# Patient Record
Sex: MALE | Race: BLACK OR AFRICAN AMERICAN | Employment: OTHER | URBAN - METROPOLITAN AREA
[De-identification: names, ages, dates, MRNs, and addresses within clinical notes are randomized per-mention and may not be internally consistent; named-entity substitution may affect disease eponyms.]

---

## 2017-02-13 RX ORDER — LISINOPRIL AND HYDROCHLOROTHIAZIDE 20; 12.5 MG/1; MG/1
TABLET ORAL
Qty: 60 TABLET | Refills: 3 | Status: SHIPPED | OUTPATIENT
Start: 2017-02-13 | End: 2017-07-06

## 2017-04-24 RX ORDER — TAMSULOSIN HYDROCHLORIDE 0.4 MG/1
CAPSULE ORAL
Qty: 30 CAPSULE | Refills: 0 | Status: SHIPPED | OUTPATIENT
Start: 2017-04-24 | End: 2017-05-12

## 2017-05-01 RX ORDER — FLUTICASONE PROPIONATE 50 MCG
SPRAY, SUSPENSION (ML) NASAL
Qty: 1 BOTTLE | Refills: 0 | Status: SHIPPED | OUTPATIENT
Start: 2017-05-01 | End: 2019-09-09

## 2017-05-12 ENCOUNTER — OFFICE VISIT (OUTPATIENT)
Dept: INTERNAL MEDICINE CLINIC | Facility: CLINIC | Age: 82
End: 2017-05-12

## 2017-05-12 VITALS
TEMPERATURE: 99 F | WEIGHT: 182.19 LBS | OXYGEN SATURATION: 98 % | BODY MASS INDEX: 27.61 KG/M2 | HEART RATE: 100 BPM | DIASTOLIC BLOOD PRESSURE: 82 MMHG | HEIGHT: 68 IN | SYSTOLIC BLOOD PRESSURE: 132 MMHG

## 2017-05-12 DIAGNOSIS — E11.9 TYPE 2 DIABETES MELLITUS WITHOUT COMPLICATION, WITHOUT LONG-TERM CURRENT USE OF INSULIN (HCC): ICD-10-CM

## 2017-05-12 DIAGNOSIS — M51.37 DEGENERATION OF LUMBAR OR LUMBOSACRAL INTERVERTEBRAL DISC: ICD-10-CM

## 2017-05-12 DIAGNOSIS — E78.5 HYPERLIPIDEMIA, UNSPECIFIED HYPERLIPIDEMIA TYPE: ICD-10-CM

## 2017-05-12 DIAGNOSIS — I10 HYPERTENSION, BENIGN: Primary | ICD-10-CM

## 2017-05-12 PROCEDURE — G0463 HOSPITAL OUTPT CLINIC VISIT: HCPCS | Performed by: INTERNAL MEDICINE

## 2017-05-12 PROCEDURE — 99213 OFFICE O/P EST LOW 20 MIN: CPT | Performed by: INTERNAL MEDICINE

## 2017-05-12 PROCEDURE — 36415 COLL VENOUS BLD VENIPUNCTURE: CPT | Performed by: INTERNAL MEDICINE

## 2017-05-12 RX ORDER — TAMSULOSIN HYDROCHLORIDE 0.4 MG/1
0.4 CAPSULE ORAL
Qty: 30 CAPSULE | Refills: 6 | Status: SHIPPED | OUTPATIENT
Start: 2017-05-12 | End: 2018-05-25

## 2017-05-12 NOTE — PATIENT INSTRUCTIONS
ASSESSMENT/PLAN:   Diagnoses and all orders for this visit:    Hypertension, benign  BP doing very well, to continue current meds  Hyperlipidemia, unspecified hyperlipidemia type  Needs cholesterol checked, 3 hours fasting but will check  Type 2 diabet

## 2017-05-12 NOTE — PROGRESS NOTES
HPI:    Patient ID: Shalini Avendaño is a 81year old male. HPI   Diabetes  Patient doing well, watching diet and not taking any medication  Hypertension  Patient is here for follow up of hypertension.  BP at home:occasionally check and is good  Has bee tablet Rfl: 3   LOVASTATIN 20 MG Oral Tab TAKE 1 TABLET BY MOUTH EVERY DAY WITH EVENING MEAL Disp: 90 tablet Rfl: 1   GABAPENTIN 300 MG Oral Cap TAKE 2 CAPSULES BY MOUTH NIGHTLY Disp: 180 capsule Rfl: 3   TraMADol HCl (ULTRAM) 50 MG Oral Tab  Disp:  Rfl: 0

## 2017-06-26 RX ORDER — LOVASTATIN 20 MG/1
TABLET ORAL
Qty: 90 TABLET | Refills: 0 | Status: SHIPPED | OUTPATIENT
Start: 2017-06-26 | End: 2017-10-10

## 2017-07-06 RX ORDER — LISINOPRIL AND HYDROCHLOROTHIAZIDE 20; 12.5 MG/1; MG/1
TABLET ORAL
Qty: 60 TABLET | Refills: 3 | Status: SHIPPED | OUTPATIENT
Start: 2017-07-06 | End: 2017-11-08

## 2017-07-07 ENCOUNTER — TELEPHONE (OUTPATIENT)
Dept: INTERNAL MEDICINE CLINIC | Facility: CLINIC | Age: 82
End: 2017-07-07

## 2017-07-07 NOTE — TELEPHONE ENCOUNTER
Patient dropped of a form for doctor to fill out for [employer] exemption (on your desk).  Call patient when ready  He will

## 2017-08-04 ENCOUNTER — HOSPITAL ENCOUNTER (EMERGENCY)
Facility: HOSPITAL | Age: 82
Discharge: HOME | End: 2017-08-04
Attending: EMERGENCY MEDICINE
Payer: COMMERCIAL

## 2017-08-04 ENCOUNTER — TELEPHONE (OUTPATIENT)
Dept: INTERNAL MEDICINE CLINIC | Facility: CLINIC | Age: 82
End: 2017-08-04

## 2017-08-04 VITALS
OXYGEN SATURATION: 99 % | DIASTOLIC BLOOD PRESSURE: 89 MMHG | RESPIRATION RATE: 18 BRPM | TEMPERATURE: 98 F | SYSTOLIC BLOOD PRESSURE: 143 MMHG | HEART RATE: 86 BPM

## 2017-08-04 DIAGNOSIS — L03.811 ABSCESS OR CELLULITIS OF SCALP: Primary | ICD-10-CM

## 2017-08-04 PROCEDURE — 99283 EMERGENCY DEPT VISIT LOW MDM: CPT

## 2017-08-04 RX ORDER — DOXYCYCLINE HYCLATE 100 MG/1
100 CAPSULE ORAL 2 TIMES DAILY
Qty: 14 CAPSULE | Refills: 0 | Status: SHIPPED | OUTPATIENT
Start: 2017-08-04 | End: 2017-08-11

## 2017-08-04 NOTE — TELEPHONE ENCOUNTER
Spoke with patient, informed to follow up next week after ER visit. Per patient he will be out of town and will call office back to schedule appointment.  Patient instructed to make sure and finish full course of antibiotic treatment, patient verbalized und

## 2017-08-04 NOTE — ED PROVIDER NOTES
Patient Seen in: [facility] AND [facility] Emergency Department    History   Patient presents with:  Bump    Stated Complaint: bump to back of head, under skin since yesterday, tender to touch denies any ot    HPI    82 yo M with PMH HTN presenting for evaluation swelling. Positive for stated complaint: bump to back of head, under skin since yesterday, tender to touch denies any ot*  Other systems are as noted in HPI. Constitutional and vital signs reviewed.       All other systems reviewed and negative except a Hyclate 100 MG Oral Cap  Take 1 capsule (100 mg total) by mouth 2 (two) times daily.   Qty: 14 capsule Refills: 0

## 2017-10-10 RX ORDER — LOVASTATIN 20 MG/1
TABLET ORAL
Qty: 90 TABLET | Refills: 0 | Status: SHIPPED | OUTPATIENT
Start: 2017-10-10 | End: 2017-11-16

## 2017-10-10 NOTE — TELEPHONE ENCOUNTER
Rx request for Lovastatin 20 mg, PASSED Cholesterol Protocol. Rx filled per protocol.   Cholesterol Medications  Protocol Criteria:  · Appointment scheduled in the past 12 months or in the next 3 months  · ALT & LDL on file in the past 12 months  · ALT resu

## 2017-11-08 RX ORDER — LISINOPRIL AND HYDROCHLOROTHIAZIDE 20; 12.5 MG/1; MG/1
TABLET ORAL
Qty: 60 TABLET | Refills: 1 | Status: SHIPPED | OUTPATIENT
Start: 2017-11-08 | End: 2017-11-16

## 2017-11-08 RX ORDER — FLUTICASONE PROPIONATE 50 MCG
SPRAY, SUSPENSION (ML) NASAL
Qty: 1 BOTTLE | Refills: 1 | Status: SHIPPED | OUTPATIENT
Start: 2017-11-08 | End: 2017-11-16

## 2017-11-16 ENCOUNTER — MED REC SCAN ONLY (OUTPATIENT)
Dept: INTERNAL MEDICINE CLINIC | Facility: CLINIC | Age: 82
End: 2017-11-16

## 2017-11-16 ENCOUNTER — OFFICE VISIT (OUTPATIENT)
Dept: INTERNAL MEDICINE CLINIC | Facility: CLINIC | Age: 82
End: 2017-11-16

## 2017-11-16 VITALS
OXYGEN SATURATION: 96 % | TEMPERATURE: 98 F | WEIGHT: 185 LBS | BODY MASS INDEX: 28.04 KG/M2 | HEIGHT: 68 IN | SYSTOLIC BLOOD PRESSURE: 126 MMHG | HEART RATE: 91 BPM | DIASTOLIC BLOOD PRESSURE: 86 MMHG

## 2017-11-16 DIAGNOSIS — E11.9 TYPE 2 DIABETES MELLITUS WITHOUT COMPLICATION, WITHOUT LONG-TERM CURRENT USE OF INSULIN (HCC): ICD-10-CM

## 2017-11-16 DIAGNOSIS — I10 HYPERTENSION, BENIGN: Primary | ICD-10-CM

## 2017-11-16 PROCEDURE — 99213 OFFICE O/P EST LOW 20 MIN: CPT | Performed by: INTERNAL MEDICINE

## 2017-11-16 PROCEDURE — G0008 ADMIN INFLUENZA VIRUS VAC: HCPCS | Performed by: INTERNAL MEDICINE

## 2017-11-16 PROCEDURE — 90653 IIV ADJUVANT VACCINE IM: CPT | Performed by: INTERNAL MEDICINE

## 2017-11-16 PROCEDURE — 90472 IMMUNIZATION ADMIN EACH ADD: CPT | Performed by: INTERNAL MEDICINE

## 2017-11-16 PROCEDURE — 90670 PCV13 VACCINE IM: CPT | Performed by: INTERNAL MEDICINE

## 2017-11-16 PROCEDURE — G0009 ADMIN PNEUMOCOCCAL VACCINE: HCPCS | Performed by: INTERNAL MEDICINE

## 2017-11-16 PROCEDURE — G0463 HOSPITAL OUTPT CLINIC VISIT: HCPCS | Performed by: INTERNAL MEDICINE

## 2017-11-16 RX ORDER — LOVASTATIN 20 MG/1
TABLET ORAL
Qty: 90 TABLET | Refills: 1 | Status: SHIPPED | OUTPATIENT
Start: 2017-11-16 | End: 2018-09-14

## 2017-11-16 RX ORDER — LISINOPRIL AND HYDROCHLOROTHIAZIDE 20; 12.5 MG/1; MG/1
2 TABLET ORAL
Qty: 180 TABLET | Refills: 1 | Status: SHIPPED | OUTPATIENT
Start: 2017-11-16 | End: 2018-05-25

## 2017-11-16 NOTE — PROGRESS NOTES
HPI:    Patient ID: Conrado Dunlap is a 81year old male. Cough   This is a new problem. Episode onset: few days. The problem has been unchanged. The cough is productive of sputum (unsure of the color).  Associated symptoms include nasal congestion an 02:18 PM    05/12/2017 02:18 PM   K 3.6 05/12/2017 02:18 PM    05/12/2017 02:18 PM   CO2 28 05/12/2017 02:18 PM   CREATSERUM 1.24 05/12/2017 02:18 PM   CA 9.9 05/12/2017 02:18 PM   AST 21 05/12/2017 02:18 PM   ALT 17 05/12/2017 02:18 PM   TSH 1 Cardiovascular: Normal rate and regular rhythm. Edema not present. Carotid bruit not present.   Pulmonary/Chest: Effort normal and breath sounds normal.           ASSESSMENT/PLAN:   Diagnoses and all orders for this visit:    Hypertension, benign  BP o

## 2017-11-16 NOTE — PATIENT INSTRUCTIONS
ASSESSMENT/PLAN:   Diagnoses and all orders for this visit:    Hypertension, benign  BP overall doing well, encouraged to exercise regularly  Type 2 diabetes mellitus without complication, without long-term current use of insulin (HCC)  -     COMP METABOLI

## 2017-12-13 NOTE — TELEPHONE ENCOUNTER
Pt wanted to let Dr Matias Neither know that his at [address] [place] [address] room . General

## 2018-05-25 ENCOUNTER — TELEPHONE (OUTPATIENT)
Dept: INTERNAL MEDICINE CLINIC | Facility: CLINIC | Age: 83
End: 2018-05-25

## 2018-05-25 RX ORDER — LISINOPRIL AND HYDROCHLOROTHIAZIDE 20; 12.5 MG/1; MG/1
2 TABLET ORAL
Qty: 180 TABLET | Refills: 1 | Status: SHIPPED | OUTPATIENT
Start: 2018-05-25 | End: 2018-10-11

## 2018-05-25 RX ORDER — TAMSULOSIN HYDROCHLORIDE 0.4 MG/1
CAPSULE ORAL
Qty: 30 CAPSULE | Refills: 0 | Status: SHIPPED | OUTPATIENT
Start: 2018-05-25 | End: 2018-08-03

## 2018-05-25 NOTE — TELEPHONE ENCOUNTER
Needs refill he is compltly out     Current Outpatient Prescriptions:   • •  Lisinopril-Hydrochlorothiazide 20-12.5 MG Oral Tab, Take 2 tablets by mouth once daily. , Disp: 180 tablet, Rfl: 1  •

## 2018-06-13 ENCOUNTER — OFFICE VISIT (OUTPATIENT)
Dept: INTERNAL MEDICINE CLINIC | Facility: CLINIC | Age: 83
End: 2018-06-13

## 2018-06-13 VITALS
HEART RATE: 91 BPM | DIASTOLIC BLOOD PRESSURE: 82 MMHG | WEIGHT: 185 LBS | HEIGHT: 68 IN | SYSTOLIC BLOOD PRESSURE: 136 MMHG | RESPIRATION RATE: 18 BRPM | BODY MASS INDEX: 28.04 KG/M2 | TEMPERATURE: 99 F

## 2018-06-13 DIAGNOSIS — R53.83 MALAISE AND FATIGUE: ICD-10-CM

## 2018-06-13 DIAGNOSIS — E11.9 TYPE 2 DIABETES MELLITUS WITHOUT COMPLICATION, WITHOUT LONG-TERM CURRENT USE OF INSULIN (HCC): ICD-10-CM

## 2018-06-13 DIAGNOSIS — I10 HYPERTENSION, BENIGN: Primary | ICD-10-CM

## 2018-06-13 DIAGNOSIS — R53.81 MALAISE AND FATIGUE: ICD-10-CM

## 2018-06-13 PROCEDURE — G0463 HOSPITAL OUTPT CLINIC VISIT: HCPCS | Performed by: INTERNAL MEDICINE

## 2018-06-13 PROCEDURE — 99213 OFFICE O/P EST LOW 20 MIN: CPT | Performed by: INTERNAL MEDICINE

## 2018-06-13 NOTE — PATIENT INSTRUCTIONS
ASSESSMENT/PLAN:   Hypertension, benign  BP overall doing very well.  Continue regular exercise    Type II diabetes mellitus ([address]  Sugar overall doing very well, will recheck labs

## 2018-06-13 NOTE — PROGRESS NOTES
HPI:    Patient ID: Diony Gomez is a 81year old male. HPI  Diabetes  Patient here for follow up of Diabetes. Has been taking medications regularly.     Currently taking no meds  Checks sugars not checking  Trying to be more active   Watches diabe Positive for nocturia (occ, not bothersome). Musculoskeletal: Positive for back pain (chronic, doing some exercises).            Current Outpatient Prescriptions:  TAMSULOSIN HCL 0.4 MG Oral Cap TAKE ONE CAPSULE BY MOUTH ONCE DAILY Disp: 30 capsule Rfl: 0

## 2018-06-15 NOTE — PROGRESS NOTES
Labs normal, lipid panel good, A1C good (but a little higher, needs to try and watch diet a little better, recheck 6 months, lipids 1 year.   Continue same meds, diet and regular exercise

## 2018-07-08 ENCOUNTER — HOSPITAL ENCOUNTER (EMERGENCY)
Facility: HOSPITAL | Age: 83
Discharge: HOME | End: 2018-07-08
Attending: EMERGENCY MEDICINE
Payer: MEDICARE

## 2018-07-08 VITALS
HEART RATE: 85 BPM | DIASTOLIC BLOOD PRESSURE: 95 MMHG | OXYGEN SATURATION: 98 % | RESPIRATION RATE: 16 BRPM | BODY MASS INDEX: 28.88 KG/M2 | TEMPERATURE: 99 F | HEIGHT: 67 IN | SYSTOLIC BLOOD PRESSURE: 166 MMHG | WEIGHT: 184 LBS

## 2018-07-08 DIAGNOSIS — I83.899 RUPTURED VARICOSITY: Primary | ICD-10-CM

## 2018-07-08 PROCEDURE — 12001 RPR S/N/AX/GEN/TRNK 2.5CM/<: CPT

## 2018-07-08 PROCEDURE — 99283 EMERGENCY DEPT VISIT LOW MDM: CPT

## 2018-07-08 NOTE — ED PROVIDER NOTES
Patient Seen in: [facility] AND [facility] Emergency Department    History   Patient presents with:  Bleeding (hematologic)    Stated Complaint: scab on right groinal area, bleeding uncontrolled    HPI    The patient is a 43-year-old male with past history of hy Nonicteric sclera, no conjunctival injection  ENT: TMs are clear and flat bilaterally.   There is no posterior pharyngeal erythema  Chest: Clear to auscultation, no tenderness  Cardiovascular: Regular rate and rhythm without murmur  Abdomen: Soft, nontender

## 2018-08-05 NOTE — TELEPHONE ENCOUNTER
Review pended refill request as it does not fall under a protocol.     Refill Protocol Appointment Criteria  · Appointment scheduled in the past 12 months or in the next 3 months  Recent Outpatient Visits            1 month ago Hypertension, benign    [place]

## 2018-08-06 RX ORDER — TAMSULOSIN HYDROCHLORIDE 0.4 MG/1
CAPSULE ORAL
Qty: 90 CAPSULE | Refills: 3 | Status: SHIPPED | OUTPATIENT
Start: 2018-08-06 | End: 2018-10-11

## 2018-09-14 RX ORDER — LOVASTATIN 20 MG/1
TABLET ORAL
Qty: 90 TABLET | Refills: 0 | Status: SHIPPED | OUTPATIENT
Start: 2018-09-14 | End: 2018-10-11

## 2018-10-11 ENCOUNTER — OFFICE VISIT (OUTPATIENT)
Dept: INTERNAL MEDICINE CLINIC | Facility: CLINIC | Age: 83
End: 2018-10-11
Payer: MEDICARE

## 2018-10-11 ENCOUNTER — MED REC SCAN ONLY (OUTPATIENT)
Dept: INTERNAL MEDICINE CLINIC | Facility: CLINIC | Age: 83
End: 2018-10-11

## 2018-10-11 VITALS
DIASTOLIC BLOOD PRESSURE: 88 MMHG | HEART RATE: 78 BPM | WEIGHT: 188 LBS | BODY MASS INDEX: 29 KG/M2 | SYSTOLIC BLOOD PRESSURE: 169 MMHG | TEMPERATURE: 98 F | OXYGEN SATURATION: 98 %

## 2018-10-11 DIAGNOSIS — I10 HYPERTENSION, BENIGN: ICD-10-CM

## 2018-10-11 DIAGNOSIS — E11.9 TYPE 2 DIABETES MELLITUS WITHOUT COMPLICATION, WITHOUT LONG-TERM CURRENT USE OF INSULIN (HCC): ICD-10-CM

## 2018-10-11 DIAGNOSIS — E78.5 HYPERLIPIDEMIA, UNSPECIFIED HYPERLIPIDEMIA TYPE: Primary | ICD-10-CM

## 2018-10-11 PROCEDURE — 99214 OFFICE O/P EST MOD 30 MIN: CPT | Performed by: INTERNAL MEDICINE

## 2018-10-11 PROCEDURE — G0008 ADMIN INFLUENZA VIRUS VAC: HCPCS | Performed by: INTERNAL MEDICINE

## 2018-10-11 PROCEDURE — 90653 IIV ADJUVANT VACCINE IM: CPT | Performed by: INTERNAL MEDICINE

## 2018-10-11 RX ORDER — LISINOPRIL AND HYDROCHLOROTHIAZIDE 20; 12.5 MG/1; MG/1
2 TABLET ORAL
Qty: 180 TABLET | Refills: 1 | Status: SHIPPED | OUTPATIENT
Start: 2018-10-11 | End: 2018-11-20

## 2018-10-11 RX ORDER — LOVASTATIN 20 MG/1
20 TABLET ORAL NIGHTLY
Qty: 90 TABLET | Refills: 0 | Status: SHIPPED | OUTPATIENT
Start: 2018-10-11 | End: 2018-11-20

## 2018-10-11 RX ORDER — TAMSULOSIN HYDROCHLORIDE 0.4 MG/1
0.4 CAPSULE ORAL
Qty: 90 CAPSULE | Refills: 3 | Status: SHIPPED | OUTPATIENT
Start: 2018-10-11 | End: 2018-11-20

## 2018-10-11 NOTE — PATIENT INSTRUCTIONS
Avoid sweets as much as possible  Please take medicines as prescribed. Avoid too much of salt. Low-Salt Choices  Eating salt (sodium) can make your body retain too much water. Excess water makes your heart work harder.  Canned, packaged, and frozen foods

## 2018-10-11 NOTE — PROGRESS NOTES
Onesimo Hutchinson is a 81year old male. Patient presents with:  Cold  Follow-up of diabetes and hypertension  HPI:   56-year-old male with a past medical history of diet-controlled diabetes hypertension dyslipidemia BPH here to establish care.   Patient repo Smokeless tobacco: Never Used    Alcohol use: No    Drug use: No     Family History   Problem Relation Age of Onset   • Heart Disease Father 79        CAD      No Known Allergies     REVIEW OF SYSTEMS:     Review of Systems   Constitutional: Negative for a respiratory distress. He has no wheezes. He has no rales. Abdominal: Soft. Bowel sounds are normal. There is no tenderness. Neurological: He is alert and oriented to person, place, and time. No cranial nerve deficit.  Coordination normal.   Skin: Skin i 1/11/2019).

## 2018-11-20 ENCOUNTER — TELEPHONE (OUTPATIENT)
Dept: INTERNAL MEDICINE CLINIC | Facility: CLINIC | Age: 83
End: 2018-11-20

## 2018-11-20 RX ORDER — LOVASTATIN 20 MG/1
20 TABLET ORAL NIGHTLY
Qty: 90 TABLET | Refills: 0 | Status: SHIPPED | OUTPATIENT
Start: 2018-11-20 | End: 2019-02-18

## 2018-11-20 RX ORDER — LISINOPRIL AND HYDROCHLOROTHIAZIDE 20; 12.5 MG/1; MG/1
2 TABLET ORAL
Qty: 180 TABLET | Refills: 0 | Status: SHIPPED | OUTPATIENT
Start: 2018-11-20 | End: 2019-03-11

## 2018-11-20 RX ORDER — TAMSULOSIN HYDROCHLORIDE 0.4 MG/1
0.4 CAPSULE ORAL
Qty: 90 CAPSULE | Refills: 0 | Status: SHIPPED | OUTPATIENT
Start: 2018-11-20 | End: 2020-02-04

## 2018-11-20 NOTE — TELEPHONE ENCOUNTER
Please resend to pharmacy  Now has insurance     Current Outpatient Medications:   •  tamsulosin HCl 0.4 MG Oral Cap, Take 1 capsule (0.4 mg total) by mouth once daily. , Disp: 90 capsule, Rfl: 3    •  Lovastatin 20 MG Oral Tab, Take 1 tablet (20 mg total)

## 2019-02-14 ENCOUNTER — OFFICE VISIT (OUTPATIENT)
Dept: INTERNAL MEDICINE CLINIC | Facility: CLINIC | Age: 84
End: 2019-02-14
Payer: MEDICARE

## 2019-02-14 VITALS
SYSTOLIC BLOOD PRESSURE: 157 MMHG | HEART RATE: 73 BPM | TEMPERATURE: 98 F | OXYGEN SATURATION: 95 % | WEIGHT: 182 LBS | DIASTOLIC BLOOD PRESSURE: 76 MMHG | BODY MASS INDEX: 29 KG/M2

## 2019-02-14 DIAGNOSIS — E78.5 HYPERLIPIDEMIA, UNSPECIFIED HYPERLIPIDEMIA TYPE: Primary | ICD-10-CM

## 2019-02-14 DIAGNOSIS — N52.8 OTHER MALE ERECTILE DYSFUNCTION: ICD-10-CM

## 2019-02-14 DIAGNOSIS — E11.9 TYPE 2 DIABETES MELLITUS WITHOUT COMPLICATION, WITHOUT LONG-TERM CURRENT USE OF INSULIN (HCC): ICD-10-CM

## 2019-02-14 DIAGNOSIS — I10 HYPERTENSION, BENIGN: ICD-10-CM

## 2019-02-14 LAB
ALBUMIN SERPL-MCNC: 3.9 G/DL (ref 3.4–5)
ALBUMIN/GLOB SERPL: 0.9 {RATIO} (ref 1–2)
ALP LIVER SERPL-CCNC: 61 U/L (ref 45–117)
ALT SERPL-CCNC: 19 U/L (ref 16–61)
ANION GAP SERPL CALC-SCNC: 3 MMOL/L (ref 0–18)
AST SERPL-CCNC: 17 U/L (ref 15–37)
BILIRUB SERPL-MCNC: 0.6 MG/DL (ref 0.1–2)
BUN BLD-MCNC: 16 MG/DL (ref 7–18)
BUN/CREAT SERPL: 12.6 (ref 10–20)
CALCIUM BLD-MCNC: 9.9 MG/DL (ref 8.5–10.1)
CHLORIDE SERPL-SCNC: 106 MMOL/L (ref 98–107)
CHOLEST SMN-MCNC: 132 MG/DL (ref ?–200)
CO2 SERPL-SCNC: 31 MMOL/L (ref 21–32)
CREAT BLD-MCNC: 1.27 MG/DL (ref 0.7–1.3)
CREAT UR-SCNC: 109 MG/DL
DEPRECATED RDW RBC AUTO: 47 FL (ref 35.1–46.3)
ERYTHROCYTE [DISTWIDTH] IN BLOOD BY AUTOMATED COUNT: 15.5 % (ref 11–15)
EST. AVERAGE GLUCOSE BLD GHB EST-MCNC: 146 MG/DL (ref 68–126)
GLOBULIN PLAS-MCNC: 4.3 G/DL (ref 2.8–4.4)
GLUCOSE BLD-MCNC: 114 MG/DL (ref 70–99)
HBA1C MFR BLD HPLC: 6.7 % (ref ?–5.7)
HCT VFR BLD AUTO: 43.6 % (ref 39–53)
HDLC SERPL-MCNC: 48 MG/DL (ref 40–59)
HGB BLD-MCNC: 13.7 G/DL (ref 13–17.5)
LDLC SERPL CALC-MCNC: 72 MG/DL (ref ?–100)
M PROTEIN MFR SERPL ELPH: 8.2 G/DL (ref 6.4–8.2)
MCH RBC QN AUTO: 26.3 PG (ref 26–34)
MCHC RBC AUTO-ENTMCNC: 31.4 G/DL (ref 31–37)
MCV RBC AUTO: 83.8 FL (ref 80–100)
MICROALBUMIN UR-MCNC: 18.5 MG/DL
MICROALBUMIN/CREAT 24H UR-RTO: 169.7 UG/MG (ref ?–30)
NONHDLC SERPL-MCNC: 84 MG/DL (ref ?–130)
OSMOLALITY SERPL CALC.SUM OF ELEC: 292 MOSM/KG (ref 275–295)
PLATELET # BLD AUTO: 230 10(3)UL (ref 150–450)
POTASSIUM SERPL-SCNC: 4.3 MMOL/L (ref 3.5–5.1)
RBC # BLD AUTO: 5.2 X10(6)UL (ref 3.8–5.8)
SODIUM SERPL-SCNC: 140 MMOL/L (ref 136–145)
TRIGL SERPL-MCNC: 61 MG/DL (ref 30–149)
TSI SER-ACNC: 1.76 MIU/ML (ref 0.36–3.74)
WBC # BLD AUTO: 3.7 X10(3) UL (ref 4–11)

## 2019-02-14 PROCEDURE — 99214 OFFICE O/P EST MOD 30 MIN: CPT | Performed by: INTERNAL MEDICINE

## 2019-02-14 RX ORDER — SILDENAFIL 50 MG/1
50 TABLET, FILM COATED ORAL
Qty: 5 TABLET | Refills: 3 | Status: SHIPPED | OUTPATIENT
Start: 2019-02-14 | End: 2019-09-09

## 2019-02-14 RX ORDER — AMLODIPINE BESYLATE 5 MG/1
5 TABLET ORAL DAILY
Qty: 90 TABLET | Refills: 3 | Status: SHIPPED | OUTPATIENT
Start: 2019-02-14 | End: 2020-02-09

## 2019-02-14 NOTE — PATIENT INSTRUCTIONS
Blood pressure is still running little bit in the higher side. I have added a new medication called amlodipine. Please take it once a day along with your other blood pressure medication. Try to avoid salt as much as possible.   For your diabetes, we have

## 2019-02-14 NOTE — PROGRESS NOTES
Polo Setting is a 81year old male. Patient presents with:  Hyperlipidemia  Follow-up of hypertension and erectile dysfunction.   HPI:   42-year-old gentleman with a past medical history of diabetes, hypertension, dyslipidemia, BPH with rectal dysfunctio History:   Procedure Laterality Date   • APPENDECTOMY     • LAMINECTOMY  1987   • LUMBAR SPINE FUSION COMBINED  8-8-13   • REPAIR ROTATOR CUFF,ACUTE Left 1993   • TONSILLECTOMY        Social History:  Social History    Tobacco Use      Smoking status:  Form No respiratory distress. He has no wheezes. He has no rales. Abdominal: Soft. Bowel sounds are normal.   Lymphadenopathy:     He has no cervical adenopathy. Neurological: He is alert and oriented to person, place, and time.  No sensory deficit or motor

## 2019-03-11 RX ORDER — LISINOPRIL AND HYDROCHLOROTHIAZIDE 20; 12.5 MG/1; MG/1
2 TABLET ORAL
Qty: 180 TABLET | Refills: 0 | Status: SHIPPED | OUTPATIENT
Start: 2019-03-11 | End: 2019-09-09

## 2019-09-09 ENCOUNTER — HOSPITAL ENCOUNTER (EMERGENCY)
Facility: HOSPITAL | Age: 84
Discharge: HOME | End: 2019-09-10
Attending: EMERGENCY MEDICINE | Admitting: INTERNAL MEDICINE
Payer: MEDICARE

## 2019-09-09 ENCOUNTER — OFFICE VISIT (OUTPATIENT)
Dept: INTERNAL MEDICINE CLINIC | Facility: CLINIC | Age: 84
End: 2019-09-09
Payer: MEDICARE

## 2019-09-09 ENCOUNTER — TELEPHONE (OUTPATIENT)
Dept: INTERNAL MEDICINE CLINIC | Facility: CLINIC | Age: 84
End: 2019-09-09

## 2019-09-09 VITALS
TEMPERATURE: 100 F | OXYGEN SATURATION: 96 % | DIASTOLIC BLOOD PRESSURE: 80 MMHG | HEART RATE: 92 BPM | SYSTOLIC BLOOD PRESSURE: 134 MMHG | RESPIRATION RATE: 20 BRPM

## 2019-09-09 VITALS
DIASTOLIC BLOOD PRESSURE: 75 MMHG | WEIGHT: 181 LBS | SYSTOLIC BLOOD PRESSURE: 130 MMHG | OXYGEN SATURATION: 95 % | HEART RATE: 73 BPM | BODY MASS INDEX: 27.43 KG/M2 | HEIGHT: 68 IN

## 2019-09-09 DIAGNOSIS — E11.9 TYPE 2 DIABETES MELLITUS WITHOUT COMPLICATION, WITHOUT LONG-TERM CURRENT USE OF INSULIN (HCC): ICD-10-CM

## 2019-09-09 DIAGNOSIS — I10 HYPERTENSION, BENIGN: Primary | ICD-10-CM

## 2019-09-09 DIAGNOSIS — N52.8 OTHER MALE ERECTILE DYSFUNCTION: ICD-10-CM

## 2019-09-09 DIAGNOSIS — S40.022A HEMATOMA OF ARM, LEFT, INITIAL ENCOUNTER: Primary | ICD-10-CM

## 2019-09-09 DIAGNOSIS — Z23 NEED FOR PNEUMOCOCCAL VACCINATION: ICD-10-CM

## 2019-09-09 DIAGNOSIS — N40.0 BENIGN PROSTATIC HYPERPLASIA WITHOUT LOWER URINARY TRACT SYMPTOMS: ICD-10-CM

## 2019-09-09 DIAGNOSIS — E78.5 HYPERLIPIDEMIA, UNSPECIFIED HYPERLIPIDEMIA TYPE: ICD-10-CM

## 2019-09-09 LAB
EST. AVERAGE GLUCOSE BLD GHB EST-MCNC: 157 MG/DL (ref 68–126)
HBA1C MFR BLD HPLC: 7.1 % (ref ?–5.7)

## 2019-09-09 PROCEDURE — 36415 COLL VENOUS BLD VENIPUNCTURE: CPT | Performed by: INTERNAL MEDICINE

## 2019-09-09 PROCEDURE — G0009 ADMIN PNEUMOCOCCAL VACCINE: HCPCS | Performed by: INTERNAL MEDICINE

## 2019-09-09 PROCEDURE — 90732 PPSV23 VACC 2 YRS+ SUBQ/IM: CPT | Performed by: INTERNAL MEDICINE

## 2019-09-09 PROCEDURE — 99282 EMERGENCY DEPT VISIT SF MDM: CPT

## 2019-09-09 PROCEDURE — 99214 OFFICE O/P EST MOD 30 MIN: CPT | Performed by: INTERNAL MEDICINE

## 2019-09-09 RX ORDER — LOVASTATIN 20 MG/1
TABLET ORAL
Qty: 90 TABLET | Refills: 1 | Status: SHIPPED | OUTPATIENT
Start: 2019-09-09 | End: 2020-03-16

## 2019-09-09 RX ORDER — LOVASTATIN 20 MG/1
TABLET ORAL
Refills: 0 | COMMUNITY
Start: 2019-04-13 | End: 2019-09-09

## 2019-09-09 RX ORDER — SILDENAFIL 100 MG/1
100 TABLET, FILM COATED ORAL AS NEEDED
Qty: 6 TABLET | Refills: 3 | Status: SHIPPED | OUTPATIENT
Start: 2019-09-09 | End: 2019-10-09

## 2019-09-09 RX ORDER — FLUTICASONE PROPIONATE 50 MCG
SPRAY, SUSPENSION (ML) NASAL
Qty: 1 BOTTLE | Refills: 0 | Status: SHIPPED | OUTPATIENT
Start: 2019-09-09

## 2019-09-09 RX ORDER — LISINOPRIL AND HYDROCHLOROTHIAZIDE 20; 12.5 MG/1; MG/1
2 TABLET ORAL
Qty: 180 TABLET | Refills: 0 | Status: SHIPPED | OUTPATIENT
Start: 2019-09-09 | End: 2019-12-17

## 2019-09-09 NOTE — PROGRESS NOTES
Daly Allison is a 81year old male. Patient presents with:  Hypertension: routine check-up  Sleep Problem: pt states that he is sleepy all the time    HPI:   60-year-old gentleman with a past medical history of hypertension, dyslipidemia, diabetes here CUFF,ACUTE Left 1993   • TONSILLECTOMY        Social History:  Social History    Tobacco Use      Smoking status: Former Smoker        Types: Cigarettes      Smokeless tobacco: Never Used    Alcohol use: No    Drug use: No     Family History   Problem Rela medications. Discussed low-salt diet. We will do the same medication. 2. Type 2 diabetes mellitus without complication, without long-term current use of insulin ([address]  He reports that he sees an ophthalmologist on an annual basis. Dov Lobo   He will try to DR NAJMA BURTON [facility]

## 2019-09-09 NOTE — PATIENT INSTRUCTIONS
ASSESSMENT AND PLAN:   1. Hypertension, benign  Well-controlled. Reviewed the medications. Discussed low-salt diet. We will do the same medication.     2. Type 2 diabetes mellitus without complication, without long-term current use of insulin ([facility]  He

## 2019-09-10 NOTE — ED PROVIDER NOTES
Patient Seen in: [facility] AND [facility] Emergency Department    History   No chief complaint on file. HPI    Patient presents to the ED complaining of swelling to his left shoulder and mild discomfort after having a shot for pneumonia in this area today. Constitutional: He is oriented to person, place, and time. He appears well-developed and well-nourished. No distress. HENT:   Head: Normocephalic and atraumatic. Cardiovascular: Normal rate and intact distal pulses.    Pulmonary/Chest: Effort normal. arm, left, initial encounter  (primary encounter diagnosis)    Disposition:  Discharge    Follow-up:  Lona Rice MD  [address] [facility] [phone]    Schedule an appointment as soon as possible for a visit in 3 days        Medications P

## 2019-09-12 ENCOUNTER — OFFICE VISIT (OUTPATIENT)
Dept: INTERNAL MEDICINE CLINIC | Facility: CLINIC | Age: 84
End: 2019-09-12
Payer: MEDICARE

## 2019-09-12 VITALS
BODY MASS INDEX: 27.28 KG/M2 | HEIGHT: 68 IN | DIASTOLIC BLOOD PRESSURE: 66 MMHG | HEART RATE: 99 BPM | SYSTOLIC BLOOD PRESSURE: 107 MMHG | WEIGHT: 180 LBS

## 2019-09-12 DIAGNOSIS — M79.602 PAIN OF LEFT UPPER EXTREMITY: Primary | ICD-10-CM

## 2019-09-12 PROCEDURE — 99213 OFFICE O/P EST LOW 20 MIN: CPT | Performed by: INTERNAL MEDICINE

## 2019-09-12 NOTE — PATIENT INSTRUCTIONS
As discussed, the arm pain is because of the localized reaction of the vaccination. I am glad to hear that it is getting better. Hopefully it will continue to improve and will be back to normal in the next 4 to 5 days.   Thank you

## 2019-09-12 NOTE — PROGRESS NOTES
Marleen Moser is a 81year old male. Patient presents with:  ER F/U: reaction to immunization    HPI:   30-year-old gentleman here for evaluation of left arm pain.   He recently received vaccine and later developed arm pain and swelling was seen in the e Former Smoker        Types: Cigarettes      Smokeless tobacco: Never Used    Alcohol use: No    Drug use: No     Family History   Problem Relation Age of Onset   • Heart Disease Father 79        CAD      No Known Allergies     REVIEW OF SYSTEMS:     Review before. Plan: As above. The patient indicates understanding of these issues and agrees to the plan. No follow-ups on file.

## 2019-09-30 NOTE — Clinical Note
FYI, TCM call made, see [facility] notes. [facility] Scheduled PCP office TCM appointment with patient, 8/13/2024 at 12:00 pm. FYI, patient will need a medication reconciliation at TCM visit. Mohs Rapid Report Verbiage: The area of clinically evident tumor was marked with skin marking ink and appropriately hatched.  The initial incision was made following the Mohs approach through the skin.  The specimen was taken to the lab, divided into the necessary number of pieces, chromacoded and processed according to the Mohs protocol.  This was repeated in successive stages until a tumor free defect was achieved.

## 2019-10-10 ENCOUNTER — IMMUNIZATION (OUTPATIENT)
Dept: INTERNAL MEDICINE CLINIC | Facility: CLINIC | Age: 84
End: 2019-10-10
Payer: MEDICARE

## 2019-10-10 ENCOUNTER — MED REC SCAN ONLY (OUTPATIENT)
Dept: INTERNAL MEDICINE CLINIC | Facility: CLINIC | Age: 84
End: 2019-10-10

## 2019-10-10 DIAGNOSIS — Z23 NEED FOR VACCINATION: ICD-10-CM

## 2019-10-10 PROCEDURE — 90662 IIV NO PRSV INCREASED AG IM: CPT | Performed by: INTERNAL MEDICINE

## 2019-10-10 PROCEDURE — G0008 ADMIN INFLUENZA VIRUS VAC: HCPCS | Performed by: INTERNAL MEDICINE

## 2019-12-17 RX ORDER — LISINOPRIL AND HYDROCHLOROTHIAZIDE 20; 12.5 MG/1; MG/1
TABLET ORAL
Qty: 180 TABLET | Refills: 1 | Status: SHIPPED | OUTPATIENT
Start: 2019-12-17 | End: 2020-06-09

## 2019-12-18 NOTE — TELEPHONE ENCOUNTER
Refill passed per [facility] protocol.   Hypertensive Medications  Protocol Criteria:  · Appointment scheduled in the past 6 months or in the next 3 months  · BMP or CMP in the past 12 months  · Creatinine result < 2  Recent Outpatient Visits

## 2020-02-04 NOTE — TELEPHONE ENCOUNTER
Patient needs a refill on    tamsulosin HCl 0.4 MG Oral Cap 90 capsule 0 11/20/2018     Sig - Route: Take 1 capsule (0.4 mg total) by mouth once daily. - Oral          Thank you.

## 2020-02-05 RX ORDER — TAMSULOSIN HYDROCHLORIDE 0.4 MG/1
0.4 CAPSULE ORAL
Qty: 90 CAPSULE | Refills: 1 | Status: SHIPPED | OUTPATIENT
Start: 2020-02-05 | End: 2020-02-10

## 2020-02-05 NOTE — TELEPHONE ENCOUNTER
Review pended refill request as it does not fall under a protocol    Last Rx: 11/20/18  LOV: 4 months ago

## 2020-02-10 ENCOUNTER — OFFICE VISIT (OUTPATIENT)
Dept: INTERNAL MEDICINE CLINIC | Facility: CLINIC | Age: 85
End: 2020-02-10
Payer: MEDICARE

## 2020-02-10 VITALS
SYSTOLIC BLOOD PRESSURE: 155 MMHG | DIASTOLIC BLOOD PRESSURE: 82 MMHG | HEART RATE: 68 BPM | OXYGEN SATURATION: 98 % | BODY MASS INDEX: 27.13 KG/M2 | WEIGHT: 179 LBS | HEIGHT: 68 IN

## 2020-02-10 DIAGNOSIS — E11.9 TYPE 2 DIABETES MELLITUS WITHOUT COMPLICATION, WITHOUT LONG-TERM CURRENT USE OF INSULIN (HCC): ICD-10-CM

## 2020-02-10 DIAGNOSIS — E78.5 HYPERLIPIDEMIA, UNSPECIFIED HYPERLIPIDEMIA TYPE: ICD-10-CM

## 2020-02-10 DIAGNOSIS — I10 HYPERTENSION, BENIGN: Primary | ICD-10-CM

## 2020-02-10 PROCEDURE — 99214 OFFICE O/P EST MOD 30 MIN: CPT | Performed by: INTERNAL MEDICINE

## 2020-02-10 RX ORDER — TAMSULOSIN HYDROCHLORIDE 0.4 MG/1
0.4 CAPSULE ORAL
Qty: 90 CAPSULE | Refills: 1 | Status: SHIPPED | OUTPATIENT
Start: 2020-02-10 | End: 2020-08-09

## 2020-02-10 RX ORDER — AMLODIPINE BESYLATE 5 MG/1
5 TABLET ORAL DAILY
Qty: 90 TABLET | Refills: 3 | Status: SHIPPED | OUTPATIENT
Start: 2020-02-10 | End: 2020-08-04

## 2020-02-10 NOTE — PROGRESS NOTES
Nathaniel Edwrad is a 81year old male. Patient presents with:  Hypertension: routine check-up  Follow-up of diabetes, hypertension, dyslipidemia.   HPI:   18-year-old gentleman with past medical history of diet-controlled diabetes, hypertension, dyslipidem Smokeless tobacco: Never Used    Alcohol use: No    Drug use: No     Family History   Problem Relation Age of Onset   • Heart Disease Father 79        CAD      No Known Allergies     REVIEW OF SYSTEMS:     Review of Systems   Constitutional: Negative for a visit.    3. Type 2 diabetes mellitus without complication, without long-term current use of insulin (HCC)  His hemoglobin A1c was 7.1. He reports that he was seen by ophthalmologist in December 2019. He does not recall the name of the doctor.   He will t

## 2020-03-16 RX ORDER — LOVASTATIN 20 MG/1
TABLET ORAL
Qty: 90 TABLET | Refills: 1 | Status: SHIPPED | OUTPATIENT
Start: 2020-03-16 | End: 2020-11-25

## 2020-05-27 ENCOUNTER — OFFICE VISIT (OUTPATIENT)
Dept: INTERNAL MEDICINE CLINIC | Facility: CLINIC | Age: 85
End: 2020-05-27
Payer: MEDICARE

## 2020-05-27 VITALS
WEIGHT: 178 LBS | HEIGHT: 68 IN | SYSTOLIC BLOOD PRESSURE: 138 MMHG | OXYGEN SATURATION: 96 % | DIASTOLIC BLOOD PRESSURE: 71 MMHG | HEART RATE: 77 BPM | BODY MASS INDEX: 26.98 KG/M2 | TEMPERATURE: 97 F

## 2020-05-27 DIAGNOSIS — E78.5 HYPERLIPIDEMIA, UNSPECIFIED HYPERLIPIDEMIA TYPE: Primary | ICD-10-CM

## 2020-05-27 DIAGNOSIS — M51.37 DEGENERATION OF LUMBAR OR LUMBOSACRAL INTERVERTEBRAL DISC: ICD-10-CM

## 2020-05-27 DIAGNOSIS — M96.1 POSTLAMINECTOMY SYNDROME, LUMBAR REGION: ICD-10-CM

## 2020-05-27 DIAGNOSIS — I10 HYPERTENSION, BENIGN: ICD-10-CM

## 2020-05-27 DIAGNOSIS — N52.8 OTHER MALE ERECTILE DYSFUNCTION: ICD-10-CM

## 2020-05-27 DIAGNOSIS — N40.0 BENIGN PROSTATIC HYPERPLASIA WITHOUT LOWER URINARY TRACT SYMPTOMS: ICD-10-CM

## 2020-05-27 DIAGNOSIS — G60.9 HEREDITARY AND IDIOPATHIC PERIPHERAL NEUROPATHY: ICD-10-CM

## 2020-05-27 DIAGNOSIS — J30.9 ALLERGIC RHINITIS, UNSPECIFIED SEASONALITY, UNSPECIFIED TRIGGER: ICD-10-CM

## 2020-05-27 DIAGNOSIS — D64.9 ANEMIA, UNSPECIFIED TYPE: ICD-10-CM

## 2020-05-27 DIAGNOSIS — E11.9 TYPE 2 DIABETES MELLITUS WITHOUT COMPLICATION, WITHOUT LONG-TERM CURRENT USE OF INSULIN (HCC): ICD-10-CM

## 2020-05-27 PROCEDURE — 96160 PT-FOCUSED HLTH RISK ASSMT: CPT | Performed by: INTERNAL MEDICINE

## 2020-05-27 PROCEDURE — G0439 PPPS, SUBSEQ VISIT: HCPCS | Performed by: INTERNAL MEDICINE

## 2020-05-27 PROCEDURE — 99397 PER PM REEVAL EST PAT 65+ YR: CPT | Performed by: INTERNAL MEDICINE

## 2020-05-27 NOTE — PROGRESS NOTES
HPI:   Nida Turner is a 81year old male who presents for a MA (Medicare Advantage) Supervisit (Once per calendar year). 81 gentleman here for Medicare super visit. He has no complaints. He reports adherence to medication.   He denies any abuse o risk.     Patient Care Team: Patient Care Team:  Kishor Thorne MD as PCP - General (Internal Medicine)  Meliza Ferrer MD (Internal Medicine)    Patient Active Problem List:     Anemia     Hypertension, benign     Allergic rhinitis     Degeneratio He  has a past medical history of Back pain, Impaired glucose tolerance, Lipid screening (04/24/2013), Other and unspecified hyperlipidemia, Rotator cuff tear, left (1993), and Unspecified essential hypertension.     He  has a past surgical history that i lb (80.7 kg).     Medicare Hearing Assessment  (Required for AWV/SWV)    Hearing Screening    Screening Method:  Questionnaire  I have a problem hearing over the telephone:  No I have trouble following the conversations when two or more people are talking a alert and oriented to person, place, and time. No cranial nerve deficit, sensory deficit or motor deficit. Coordination normal.   Skin: Skin is warm and dry. Psychiatric: He has a normal mood and affect.  His behavior is normal. Judgment normal.         V continue with the fluticasone    Anemia, unspecified type-stable check CBC    Other male erectile dysfunction-stable    Encourage patient to eat healthy with fruits and vegetables. Avoid too much of sweets and carbohydrates.   I encourage patient exercise date    Colorectal Cancer Screening      Colonoscopy Screen every 10 years There are no preventive care reminders to display for this patient.  Update Health Maintenance if applicable    Flex Sigmoidoscopy Screen every 10 years No results found for this or Potassium (mmol/L)   Date Value   02/14/2019 4.3     POTASSIUM (P) (mmol/L)   Date Value   04/25/2016 3.6    No flowsheet data found. Creatinine  Annually Creatinine (mg/dL)   Date Value   02/14/2019 1.27    No flowsheet data found.     Drug Serum Conc

## 2020-06-09 RX ORDER — LISINOPRIL AND HYDROCHLOROTHIAZIDE 20; 12.5 MG/1; MG/1
TABLET ORAL
Qty: 180 TABLET | Refills: 1 | Status: SHIPPED | OUTPATIENT
Start: 2020-06-09 | End: 2020-12-20

## 2020-08-04 RX ORDER — AMLODIPINE BESYLATE 5 MG/1
TABLET ORAL
Qty: 90 TABLET | Refills: 3 | Status: SHIPPED | OUTPATIENT
Start: 2020-08-04 | End: 2021-07-31

## 2020-08-09 RX ORDER — TAMSULOSIN HYDROCHLORIDE 0.4 MG/1
CAPSULE ORAL
Qty: 90 CAPSULE | Refills: 1 | Status: SHIPPED | OUTPATIENT
Start: 2020-08-09 | End: 2021-03-18

## 2020-08-26 ENCOUNTER — OFFICE VISIT (OUTPATIENT)
Dept: INTERNAL MEDICINE CLINIC | Facility: CLINIC | Age: 85
End: 2020-08-26
Payer: MEDICARE

## 2020-08-26 VITALS
WEIGHT: 178 LBS | TEMPERATURE: 97 F | DIASTOLIC BLOOD PRESSURE: 78 MMHG | BODY MASS INDEX: 26.98 KG/M2 | HEART RATE: 75 BPM | HEIGHT: 68 IN | SYSTOLIC BLOOD PRESSURE: 136 MMHG

## 2020-08-26 DIAGNOSIS — I10 HYPERTENSION, BENIGN: Primary | ICD-10-CM

## 2020-08-26 DIAGNOSIS — E78.5 HYPERLIPIDEMIA, UNSPECIFIED HYPERLIPIDEMIA TYPE: ICD-10-CM

## 2020-08-26 DIAGNOSIS — E11.9 TYPE 2 DIABETES MELLITUS WITHOUT COMPLICATION, WITHOUT LONG-TERM CURRENT USE OF INSULIN (HCC): ICD-10-CM

## 2020-08-26 PROCEDURE — 3075F SYST BP GE 130 - 139MM HG: CPT | Performed by: INTERNAL MEDICINE

## 2020-08-26 PROCEDURE — 99214 OFFICE O/P EST MOD 30 MIN: CPT | Performed by: INTERNAL MEDICINE

## 2020-08-26 PROCEDURE — 3078F DIAST BP <80 MM HG: CPT | Performed by: INTERNAL MEDICINE

## 2020-08-26 PROCEDURE — 3008F BODY MASS INDEX DOCD: CPT | Performed by: INTERNAL MEDICINE

## 2020-09-03 ENCOUNTER — TELEPHONE (OUTPATIENT)
Dept: INTERNAL MEDICINE CLINIC | Facility: CLINIC | Age: 85
End: 2020-09-03

## 2020-09-24 ENCOUNTER — IMMUNIZATION (OUTPATIENT)
Dept: INTERNAL MEDICINE CLINIC | Facility: CLINIC | Age: 85
End: 2020-09-24
Payer: MEDICARE

## 2020-09-24 DIAGNOSIS — Z23 NEED FOR VACCINATION: ICD-10-CM

## 2020-09-24 PROCEDURE — G0008 ADMIN INFLUENZA VIRUS VAC: HCPCS | Performed by: INTERNAL MEDICINE

## 2020-09-24 PROCEDURE — 90662 IIV NO PRSV INCREASED AG IM: CPT | Performed by: INTERNAL MEDICINE

## 2020-11-25 ENCOUNTER — OFFICE VISIT (OUTPATIENT)
Dept: INTERNAL MEDICINE CLINIC | Facility: CLINIC | Age: 85
End: 2020-11-25
Payer: MEDICARE

## 2020-11-25 VITALS
SYSTOLIC BLOOD PRESSURE: 122 MMHG | DIASTOLIC BLOOD PRESSURE: 80 MMHG | BODY MASS INDEX: 28.04 KG/M2 | WEIGHT: 185 LBS | RESPIRATION RATE: 14 BRPM | OXYGEN SATURATION: 99 % | TEMPERATURE: 98 F | HEIGHT: 68 IN | HEART RATE: 82 BPM

## 2020-11-25 DIAGNOSIS — E78.5 HYPERLIPIDEMIA, UNSPECIFIED HYPERLIPIDEMIA TYPE: Primary | ICD-10-CM

## 2020-11-25 DIAGNOSIS — E11.9 TYPE 2 DIABETES MELLITUS WITHOUT COMPLICATION, WITHOUT LONG-TERM CURRENT USE OF INSULIN (HCC): ICD-10-CM

## 2020-11-25 DIAGNOSIS — I10 HYPERTENSION, BENIGN: ICD-10-CM

## 2020-11-25 PROCEDURE — 3074F SYST BP LT 130 MM HG: CPT | Performed by: INTERNAL MEDICINE

## 2020-11-25 PROCEDURE — 3008F BODY MASS INDEX DOCD: CPT | Performed by: INTERNAL MEDICINE

## 2020-11-25 PROCEDURE — 99214 OFFICE O/P EST MOD 30 MIN: CPT | Performed by: INTERNAL MEDICINE

## 2020-11-25 PROCEDURE — 3079F DIAST BP 80-89 MM HG: CPT | Performed by: INTERNAL MEDICINE

## 2020-11-25 RX ORDER — LOVASTATIN 20 MG/1
TABLET ORAL
Qty: 90 TABLET | Refills: 1 | Status: SHIPPED | OUTPATIENT
Start: 2020-11-25 | End: 2021-03-31

## 2020-11-25 RX ORDER — LOVASTATIN 20 MG/1
TABLET ORAL
Qty: 90 TABLET | Refills: 1 | OUTPATIENT
Start: 2020-11-25

## 2020-11-25 NOTE — PROGRESS NOTES
Miri Varghese is a 81year old male. Patient presents with:  Hypertension: 3 mos f/u   Diabetes  Cholesterol    HPI:   81 gentleman with a past medical history of hypertension, dyslipidemia, diabetes here for follow-up.   He reports that he is doing well CAD      No Known Allergies     REVIEW OF SYSTEMS:     Review of Systems   Constitutional: Negative for activity change, appetite change and fever. HENT: Negative for congestion and voice change.     Respiratory: Negative for cough and shortness of david Continue ACE inhibitor's. Ophthalmology evaluation is coming up.   I have given him the name an  phone number for his ophthalmologist.    Plan: As above medication refilled  I will see him back in 4 months      The patient indicates understanding of these

## 2020-11-25 NOTE — TELEPHONE ENCOUNTER
Disp Refills Start End     Lovastatin 20 MG Oral Tab 90 tablet 1 11/25/2020     Sig: TAKE 1 TABLET BY MOUTH AT NIGHT    Sent to pharmacy as: Lovastatin 20 MG Oral Tablet    E-Prescribing Status: Sent to pharmacy (11/25/2020  9:16 AM CST)    Pharmacy    Dharmesh Gross

## 2020-12-20 RX ORDER — LISINOPRIL AND HYDROCHLOROTHIAZIDE 20; 12.5 MG/1; MG/1
TABLET ORAL
Qty: 180 TABLET | Refills: 1 | Status: SHIPPED | OUTPATIENT
Start: 2020-12-20 | End: 2021-06-25

## 2021-02-03 DIAGNOSIS — Z23 NEED FOR VACCINATION: ICD-10-CM

## 2021-03-18 RX ORDER — TAMSULOSIN HYDROCHLORIDE 0.4 MG/1
0.4 CAPSULE ORAL DAILY
Qty: 90 CAPSULE | Refills: 1 | Status: SHIPPED | OUTPATIENT
Start: 2021-03-18

## 2021-03-26 ENCOUNTER — OFFICE VISIT (OUTPATIENT)
Dept: INTERNAL MEDICINE CLINIC | Facility: CLINIC | Age: 86
End: 2021-03-26
Payer: MEDICARE

## 2021-03-26 VITALS
WEIGHT: 184 LBS | BODY MASS INDEX: 27.89 KG/M2 | HEIGHT: 68 IN | OXYGEN SATURATION: 99 % | RESPIRATION RATE: 14 BRPM | DIASTOLIC BLOOD PRESSURE: 76 MMHG | SYSTOLIC BLOOD PRESSURE: 134 MMHG | HEART RATE: 80 BPM

## 2021-03-26 DIAGNOSIS — E11.9 TYPE 2 DIABETES MELLITUS WITHOUT COMPLICATION, WITHOUT LONG-TERM CURRENT USE OF INSULIN (HCC): Primary | ICD-10-CM

## 2021-03-26 DIAGNOSIS — E78.5 HYPERLIPIDEMIA, UNSPECIFIED HYPERLIPIDEMIA TYPE: ICD-10-CM

## 2021-03-26 DIAGNOSIS — I10 HYPERTENSION, BENIGN: ICD-10-CM

## 2021-03-26 DIAGNOSIS — N40.0 BENIGN PROSTATIC HYPERPLASIA WITHOUT LOWER URINARY TRACT SYMPTOMS: ICD-10-CM

## 2021-03-26 LAB
ALBUMIN SERPL-MCNC: 4 G/DL (ref 3.4–5)
ALBUMIN/GLOB SERPL: 0.9 {RATIO} (ref 1–2)
ALP LIVER SERPL-CCNC: 63 U/L
ALT SERPL-CCNC: 23 U/L
ANION GAP SERPL CALC-SCNC: 3 MMOL/L (ref 0–18)
AST SERPL-CCNC: 20 U/L (ref 15–37)
BILIRUB SERPL-MCNC: 0.4 MG/DL (ref 0.1–2)
BUN BLD-MCNC: 18 MG/DL (ref 7–18)
BUN/CREAT SERPL: 15.5 (ref 10–20)
CALCIUM BLD-MCNC: 10 MG/DL (ref 8.5–10.1)
CHLORIDE SERPL-SCNC: 105 MMOL/L (ref 98–112)
CHOLEST SMN-MCNC: 143 MG/DL (ref ?–200)
CO2 SERPL-SCNC: 33 MMOL/L (ref 21–32)
CREAT BLD-MCNC: 1.16 MG/DL
CREAT UR-SCNC: 75.1 MG/DL
DEPRECATED RDW RBC AUTO: 48.8 FL (ref 35.1–46.3)
ERYTHROCYTE [DISTWIDTH] IN BLOOD BY AUTOMATED COUNT: 15.8 % (ref 11–15)
EST. AVERAGE GLUCOSE BLD GHB EST-MCNC: 157 MG/DL (ref 68–126)
GLOBULIN PLAS-MCNC: 4.3 G/DL (ref 2.8–4.4)
GLUCOSE BLD-MCNC: 102 MG/DL (ref 70–99)
HBA1C MFR BLD HPLC: 7.1 % (ref ?–5.7)
HCT VFR BLD AUTO: 40.3 %
HDLC SERPL-MCNC: 47 MG/DL (ref 40–59)
HGB BLD-MCNC: 12.2 G/DL
LDLC SERPL CALC-MCNC: 83 MG/DL (ref ?–100)
M PROTEIN MFR SERPL ELPH: 8.3 G/DL (ref 6.4–8.2)
MCH RBC QN AUTO: 25.8 PG (ref 26–34)
MCHC RBC AUTO-ENTMCNC: 30.3 G/DL (ref 31–37)
MCV RBC AUTO: 85.2 FL
MICROALBUMIN UR-MCNC: 28.2 MG/DL
MICROALBUMIN/CREAT 24H UR-RTO: 375.5 UG/MG (ref ?–30)
NONHDLC SERPL-MCNC: 96 MG/DL (ref ?–130)
OSMOLALITY SERPL CALC.SUM OF ELEC: 294 MOSM/KG (ref 275–295)
PATIENT FASTING Y/N/NP: YES
PATIENT FASTING Y/N/NP: YES
PLATELET # BLD AUTO: 244 10(3)UL (ref 150–450)
POTASSIUM SERPL-SCNC: 3.8 MMOL/L (ref 3.5–5.1)
RBC # BLD AUTO: 4.73 X10(6)UL
SODIUM SERPL-SCNC: 141 MMOL/L (ref 136–145)
TRIGL SERPL-MCNC: 65 MG/DL (ref 30–149)
TSI SER-ACNC: 2.66 MIU/ML (ref 0.36–3.74)
VLDLC SERPL CALC-MCNC: 13 MG/DL (ref 0–30)
WBC # BLD AUTO: 4.4 X10(3) UL (ref 4–11)

## 2021-03-26 PROCEDURE — 99214 OFFICE O/P EST MOD 30 MIN: CPT | Performed by: INTERNAL MEDICINE

## 2021-03-26 PROCEDURE — 3008F BODY MASS INDEX DOCD: CPT | Performed by: INTERNAL MEDICINE

## 2021-03-26 PROCEDURE — 36415 COLL VENOUS BLD VENIPUNCTURE: CPT | Performed by: INTERNAL MEDICINE

## 2021-03-26 PROCEDURE — 3078F DIAST BP <80 MM HG: CPT | Performed by: INTERNAL MEDICINE

## 2021-03-26 PROCEDURE — 3075F SYST BP GE 130 - 139MM HG: CPT | Performed by: INTERNAL MEDICINE

## 2021-03-26 NOTE — PROGRESS NOTES
Patient had his DM Eye Exam in 1/2021 with Dr.Christopher May at [facility] in [facility]. Called their office but is closed left a v/m to fax report

## 2021-03-26 NOTE — PROGRESS NOTES
Juice Ball is a 81year old male. Patient presents with: Follow - Up  Diabetes  Hypertension  Hyperlipidemia    HPI:   81-year-old gentleman with a past medical history of diet-controlled diabetes, hypertension, dyslipidemia here for follow-up.   He Systems   Constitutional: Negative for activity change, appetite change and fever. HENT: Negative for congestion and voice change. Respiratory: Negative for cough and shortness of breath. Cardiovascular: Negative for chest pain.    Gastrointestinal: controlled. Check hemoglobin A1c and initiate medications if needed. Check urine for microalbumin. Continue statins. Reports that he has completed ophthalmology evaluation. Given her the report. We have called ophthalmology office and left voicemail.

## 2021-03-30 ENCOUNTER — TELEPHONE (OUTPATIENT)
Dept: INTERNAL MEDICINE CLINIC | Facility: CLINIC | Age: 86
End: 2021-03-30

## 2021-03-30 DIAGNOSIS — E11.9 TYPE 2 DIABETES MELLITUS WITHOUT COMPLICATION, WITHOUT LONG-TERM CURRENT USE OF INSULIN (HCC): Primary | ICD-10-CM

## 2021-03-30 NOTE — TELEPHONE ENCOUNTER
Patient called and reported the following exam has already been completed:    Test name: Diabetic Eye Exam  Date Test Performed: January/February (couldn't Remember)  Performing Provider/Location: Couldn't Remember Provider's Name and [facility]

## 2021-03-31 RX ORDER — LOVASTATIN 20 MG/1
TABLET ORAL
Qty: 90 TABLET | Refills: 1 | Status: SHIPPED | OUTPATIENT
Start: 2021-03-31 | End: 2021-10-31

## 2021-03-31 NOTE — TELEPHONE ENCOUNTER
Pt had eye exam on 01/12/21 at [facility],  # [phone]. Pt would like some information, teaching on DM since he was just diagnosed, pt does not know what to eat, states he is starving himself, please advise.

## 2021-03-31 NOTE — TELEPHONE ENCOUNTER
I have placed a referral for diabetic education. Please inform patient. Please let him call and make an appointment. Please reach out to his family [facility] and obtain the records. Once we get the records, please document in health maintenance.

## 2021-03-31 NOTE — TELEPHONE ENCOUNTER
Spoke with patient gave telephone # to schedule diabetic education.  Patient stated he did bring in the paper work from his eye exam on 1/12/21 on 3/31/21 to the [facility] and gave it to the ,

## 2021-04-01 ENCOUNTER — MED REC SCAN ONLY (OUTPATIENT)
Dept: INTERNAL MEDICINE CLINIC | Facility: CLINIC | Age: 86
End: 2021-04-01

## 2021-04-01 ENCOUNTER — TELEPHONE (OUTPATIENT)
Dept: INTERNAL MEDICINE CLINIC | Facility: CLINIC | Age: 86
End: 2021-04-01

## 2021-04-01 NOTE — TELEPHONE ENCOUNTER
Ask  about the Eye Exam Report and they stated they did not recall the patient dropping off report yesterday 3/31/21. I called [facility] in [address] and spoke with Nury Loo who stated she will be faxing Report today.

## 2021-04-07 ENCOUNTER — IMMUNIZATION (OUTPATIENT)
Dept: LAB | Facility: HOSPITAL | Age: 86
End: 2021-04-07
Attending: HOSPITALIST
Payer: MEDICARE

## 2021-04-07 DIAGNOSIS — Z23 NEED FOR VACCINATION: Primary | ICD-10-CM

## 2021-04-07 PROCEDURE — 0011A SARSCOV2 VAC 100MCG/0.5ML IM: CPT

## 2021-04-09 ENCOUNTER — HOSPITAL ENCOUNTER (OUTPATIENT)
Dept: ENDOCRINOLOGY | Facility: HOSPITAL | Age: 86
Discharge: HOME | End: 2021-04-09
Attending: INTERNAL MEDICINE
Payer: MEDICARE

## 2021-04-09 ENCOUNTER — APPOINTMENT (OUTPATIENT)
Dept: ENDOCRINOLOGY | Facility: HOSPITAL | Age: 86
End: 2021-04-09
Attending: INTERNAL MEDICINE
Payer: MEDICARE

## 2021-04-09 VITALS — BODY MASS INDEX: 27 KG/M2 | WEIGHT: 177.69 LBS

## 2021-04-09 DIAGNOSIS — E11.9 TYPE 2 DIABETES MELLITUS WITHOUT COMPLICATION, WITHOUT LONG-TERM CURRENT USE OF INSULIN (HCC): Primary | ICD-10-CM

## 2021-04-09 PROCEDURE — 97802 MEDICAL NUTRITION INDIV IN: CPT

## 2021-04-09 NOTE — PROGRESS NOTES
Medical Nutrition Therapy Assessment    Ramana Hal 3/20/1935 was seen for individual Diabetic Medical Nutrition Therapy/ Initial/ Individual:    Date: 4/9/2021  Start time 1245  End time: 1300    Assessment  New onset diabetes; pt states that he wan consumption, including identifying foods that are carbohydrates, lean protein, non-starchy vegetables, and heart healthy fats   [x] stressed importance of carbohydrate consistency in each meal   [] recommended carbohydrate targets of  grams at meals and  g glucose tabs, 4 ounces of juice, or as discussed). Retest in 15 min. If not above 70 mg/dl, retreat. Call Red Cook RD at [phone] (option 3) for problems/concerns. No follow-ups on file.     Red Cook RD

## 2021-05-02 ENCOUNTER — HOSPITAL ENCOUNTER (OUTPATIENT)
Facility: HOSPITAL | Age: 86
Setting detail: OBSERVATION
Discharge: HOME | End: 2021-05-05
Attending: EMERGENCY MEDICINE | Admitting: INTERNAL MEDICINE
Payer: MEDICARE

## 2021-05-02 DIAGNOSIS — K62.5 RECTAL BLEEDING: Primary | ICD-10-CM

## 2021-05-02 DIAGNOSIS — K92.1 HEMATOCHEZIA: ICD-10-CM

## 2021-05-02 DIAGNOSIS — Z79.82 ASPIRIN LONG-TERM USE: ICD-10-CM

## 2021-05-02 DIAGNOSIS — Z87.19 HISTORY OF DIVERTICULOSIS: ICD-10-CM

## 2021-05-03 PROBLEM — Z87.19 HISTORY OF DIVERTICULOSIS: Status: ACTIVE | Noted: 2021-05-03

## 2021-05-03 PROBLEM — Z79.82 ASPIRIN LONG-TERM USE: Status: ACTIVE | Noted: 2021-05-03

## 2021-05-03 PROCEDURE — 99219 INITIAL OBSERVATION CARE,LEVL II: CPT | Performed by: INTERNAL MEDICINE

## 2021-05-03 RX ORDER — MORPHINE SULFATE 4 MG/ML
4 INJECTION, SOLUTION INTRAMUSCULAR; INTRAVENOUS EVERY 2 HOUR PRN
Status: DISCONTINUED | OUTPATIENT
Start: 2021-05-03 | End: 2021-05-05

## 2021-05-03 RX ORDER — MORPHINE SULFATE 2 MG/ML
1 INJECTION, SOLUTION INTRAMUSCULAR; INTRAVENOUS EVERY 2 HOUR PRN
Status: DISCONTINUED | OUTPATIENT
Start: 2021-05-03 | End: 2021-05-05

## 2021-05-03 RX ORDER — SODIUM CHLORIDE 9 MG/ML
INJECTION, SOLUTION INTRAVENOUS ONCE
Status: COMPLETED | OUTPATIENT
Start: 2021-05-03 | End: 2021-05-03

## 2021-05-03 RX ORDER — BISACODYL 10 MG
10 SUPPOSITORY, RECTAL RECTAL
Status: DISCONTINUED | OUTPATIENT
Start: 2021-05-03 | End: 2021-05-05

## 2021-05-03 RX ORDER — METOCLOPRAMIDE HYDROCHLORIDE 5 MG/ML
10 INJECTION INTRAMUSCULAR; INTRAVENOUS EVERY 8 HOURS PRN
Status: DISCONTINUED | OUTPATIENT
Start: 2021-05-03 | End: 2021-05-05

## 2021-05-03 RX ORDER — SODIUM PHOSPHATE, DIBASIC AND SODIUM PHOSPHATE, MONOBASIC 7; 19 G/133ML; G/133ML
1 ENEMA RECTAL ONCE AS NEEDED
Status: DISCONTINUED | OUTPATIENT
Start: 2021-05-03 | End: 2021-05-05

## 2021-05-03 RX ORDER — AMLODIPINE BESYLATE 5 MG/1
5 TABLET ORAL DAILY
Status: DISCONTINUED | OUTPATIENT
Start: 2021-05-03 | End: 2021-05-05

## 2021-05-03 RX ORDER — HYDROCHLOROTHIAZIDE 25 MG/1
25 TABLET ORAL DAILY
Status: DISCONTINUED | OUTPATIENT
Start: 2021-05-03 | End: 2021-05-05

## 2021-05-03 RX ORDER — LISINOPRIL 40 MG/1
40 TABLET ORAL DAILY
Status: DISCONTINUED | OUTPATIENT
Start: 2021-05-03 | End: 2021-05-05

## 2021-05-03 RX ORDER — MORPHINE SULFATE 2 MG/ML
2 INJECTION, SOLUTION INTRAMUSCULAR; INTRAVENOUS EVERY 2 HOUR PRN
Status: DISCONTINUED | OUTPATIENT
Start: 2021-05-03 | End: 2021-05-05

## 2021-05-03 RX ORDER — POLYETHYLENE GLYCOL 3350 17 G/17G
17 POWDER, FOR SOLUTION ORAL DAILY PRN
Status: DISCONTINUED | OUTPATIENT
Start: 2021-05-03 | End: 2021-05-05

## 2021-05-03 RX ORDER — TAMSULOSIN HYDROCHLORIDE 0.4 MG/1
0.4 CAPSULE ORAL DAILY
Status: DISCONTINUED | OUTPATIENT
Start: 2021-05-03 | End: 2021-05-05

## 2021-05-03 RX ORDER — ACETAMINOPHEN 325 MG/1
650 TABLET ORAL EVERY 6 HOURS PRN
Status: DISCONTINUED | OUTPATIENT
Start: 2021-05-03 | End: 2021-05-05

## 2021-05-03 RX ORDER — MAGNESIUM CARB/ALUMINUM HYDROX 105-160MG
296 TABLET,CHEWABLE ORAL ONCE
Status: COMPLETED | OUTPATIENT
Start: 2021-05-03 | End: 2021-05-03

## 2021-05-03 RX ORDER — ONDANSETRON 2 MG/ML
4 INJECTION INTRAMUSCULAR; INTRAVENOUS EVERY 6 HOURS PRN
Status: DISCONTINUED | OUTPATIENT
Start: 2021-05-03 | End: 2021-05-05

## 2021-05-03 RX ORDER — GABAPENTIN 300 MG/1
600 CAPSULE ORAL NIGHTLY
Status: DISCONTINUED | OUTPATIENT
Start: 2021-05-03 | End: 2021-05-05

## 2021-05-03 RX ORDER — ATORVASTATIN CALCIUM 20 MG/1
20 TABLET, FILM COATED ORAL NIGHTLY
Status: DISCONTINUED | OUTPATIENT
Start: 2021-05-03 | End: 2021-05-05

## 2021-05-03 RX ORDER — LISINOPRIL AND HYDROCHLOROTHIAZIDE 20; 12.5 MG/1; MG/1
2 TABLET ORAL DAILY
Status: DISCONTINUED | OUTPATIENT
Start: 2021-05-03 | End: 2021-05-03 | Stop reason: RX

## 2021-05-03 RX ORDER — LISINOPRIL AND HYDROCHLOROTHIAZIDE 20; 12.5 MG/1; MG/1
2 TABLET ORAL DAILY
Status: DISCONTINUED | OUTPATIENT
Start: 2021-05-03 | End: 2021-05-03 | Stop reason: SDUPTHER

## 2021-05-03 RX ORDER — DEXTROSE MONOHYDRATE 25 G/50ML
50 INJECTION, SOLUTION INTRAVENOUS
Status: DISCONTINUED | OUTPATIENT
Start: 2021-05-03 | End: 2021-05-05

## 2021-05-03 NOTE — PLAN OF CARE
Patient admitted with GI bleed. No bleeding noted overnight. Instructed patient if he has a bowel movement to not flush toilet and notify RN. All vital signs stable. No complaints of pain. NPO maintained. IVF running. Up independently. Voiding freely.  Plan ADULT - FALL  Goal: Free from fall injury  Description: INTERVENTIONS:  - Assess pt frequently for physical needs  - Identify cognitive and physical deficits and behaviors that affect risk of falls. - [place] fall precautions as indicated by assessment. from bleeding injury  Description: (Example usage: patient with low platelets)  INTERVENTIONS:  - Avoid intramuscular injections, enemas and rectal medication administration  - Ensure safe mobilization of patient  - Hold pressure on venipuncture sites to a

## 2021-05-03 NOTE — ED INITIAL ASSESSMENT (HPI)
Patient presents to ER with concerns of bright red blood in the stool. Patient admits 2 Bowel movements, both containing a significant amount of blood. Denies dizziness, or light headedness, no other complaints.

## 2021-05-03 NOTE — SPIRITUAL CARE NOTE
Pt was in good spirit, he lives by himsefl with two dogs. his sons visit him often, and he has good connection with his neighbours. Pt is hopeful for his recovery.  provided listening and encouragement.   Adina Brown, [address] [place] [address] resident, ext [phone]

## 2021-05-03 NOTE — CONSULTS
[address] [place] [address] Patient Status:  Observation   Date of Birth 3/20/1935 MRN [id number]   [facility] 4W/SW/SE Attending Renata Monroy MD   Hosp Day # 0 PCP La Brooks MD     Date Taking? Yes, Authorizing Provider Erica Tolliver MD    Medication GABAPENTIN 300 MG Oral Cap, Sig TAKE 2 CAPSULES BY MOUTH NIGHTLY, Start Date 10/8/16, End Date , Taking?  Yes, Authorizing Provider Faith House MD    Medication aspirin 81 MG Oral PRN  Pantoprazole Sodium (PROTONIX) 40 mg in Sodium Chloride (PF) 0.9 % 10 mL IV push, 40 mg, Intravenous, Q12H  Insulin Regular Human (NOVOLIN R) 100 UNIT/ML injection 1-7 Units, 1-7 Units, Subcutaneous, TID AC and HS  lisinopril tab 40 mg, 40 mg, Oral, D call.    Plan:    1. Follow hemoglobin. 2. Colonoscopy later today. The procedure was reviewed with the patient. The patient is aware of the risks, including bleeding, perforation and anesthetic complications.  The limitations of the procedure were reviewe

## 2021-05-03 NOTE — ED PROVIDER NOTES
Patient Seen in: [facility] Emergency Department    History   Patient presents with:  GI Bleeding    Stated Complaint: GI bleed     HPI    81y o M with PMH DM, HL on 81mg ASA daily presenting for evaluation of rectal bleeding x2 described as both br Smoker        Types: Cigarettes      Smokeless tobacco: Never Used    Vaping Use      Vaping Use: Never used    Alcohol use: No    Drug use: No      Review of Systems :  Constitutional: As per HPI  Gastrointestinal: Negative for vomiting and abdominal pain DIFFERENTIAL[[id number]]          Abnormal            Final result                 Please view results for these tests on the individual orders. BASIC METABOLIC PANEL (8)   TYPE AND SCREEN    Narrative:      The following orders were created for panel orde details. We recommend that you schedule follow up care with a primary care provider within the next three months to obtain basic health screening including reassessment of your blood pressure.       You had elevated blood pressure today and you need to f

## 2021-05-03 NOTE — ED QUICK NOTES
Pt c/o 2 episodes of blood in the stool since this afternoon. Notes that he takes 81mg asa daily and also has hx of polyps. Pt is aox4. No apparent signs of distress. Positive hemoccult per ERMD.     Will continue to monitor.

## 2021-05-03 NOTE — PROGRESS NOTES
[facility] AND [facility]  GI Progress Note      Dasia Deluna Patient Status:  Observation    3/20/1935 MRN [id number]   Location [facility] 4W/SW/SE Attending Wen Martinez MD   Hosp Day # 0 PCP Margie Rose MD     The patient did not adequate

## 2021-05-03 NOTE — ED QUICK NOTES
Orders for admission, patient is aware of plan and ready to go upstairs. Any questions, please call ED RN Clau Green at extension [phone].    Type of COVID test sent:rapid  COVID Suspicion level: Low    Titratable drug(s) infusing:none  Rate:    LOC at time of tr

## 2021-05-03 NOTE — H&P
[facility] HOSP - [facility]    History and Physical    Gil Mendez Patient Status:  Observation    3/20/1935 MRN [id number]   Location [facility] 4W/SW/SE Attending Markie Vega MD   Hosp Day # 0 PCP Kailee Chester MD     Date:  5/3/2021 Tablet 24 Hr, Take 1 tablet (500 mg total) by mouth daily. tamsulosin (FLOMAX) cap, Take 1 capsule (0.4 mg total) by mouth daily.   LISINOPRIL-HYDROCHLOROTHIAZIDE 20-12.5 MG Oral Tab, TAKE 2 TABLETS BY MOUTH EVERY DAY  AMLODIPINE BESYLATE 5 MG Oral Tab, TA time. No cranial nerve deficit or motor deficit. Psychiatric: He has a normal mood and affect.  His behavior is normal.         Results:     Lab Results   Component Value Date    WBC 4.0 05/03/2021    HGB 11.2 (L) 05/03/2021    HCT 36.7 (L) 05/03/2021

## 2021-05-03 NOTE — PLAN OF CARE
Patient tolerated bowel prep well, still having brown/small formed pieces in his stool, Dr. Ninoska Sawyer aware. Colonoscopy planned for tomorrow instead. Plan for another dose of Golytely this evening. Continues having bloody BM, denies dizziness.  Ambulating physical limitations  - Instruct pt to call for assistance with activity based on assessment  - Modify environment to reduce risk of injury  - Provide assistive devices as appropriate  - Consider OT/PT consult to assist with strengthening/mobility  - Encou symptoms of electrolyte imbalances  - Administer electrolyte replacement as ordered  - Monitor response to electrolyte replacements, including rhythm and repeat lab results as appropriate  - Fluid restriction as ordered  - Instruct patient on fluid and nut

## 2021-05-03 NOTE — CM/SW NOTE
CM received MDO for dc planning. CM met with pt at bedside. Verified address and telephone number. Pt states that he lives on the 1st floor of a 2 flat appartment, alone. 3-4 steps to enter. Pts son's live nearby and check on him often.  Pt states that

## 2021-05-04 ENCOUNTER — ANESTHESIA EVENT (OUTPATIENT)
Dept: ENDOSCOPY | Facility: HOSPITAL | Age: 86
End: 2021-05-04
Payer: MEDICARE

## 2021-05-04 ENCOUNTER — ANESTHESIA (OUTPATIENT)
Dept: ENDOSCOPY | Facility: HOSPITAL | Age: 86
End: 2021-05-04
Payer: MEDICARE

## 2021-05-04 PROCEDURE — 99226 SUBSEQUENT OBSERVATION CARE: CPT | Performed by: INTERNAL MEDICINE

## 2021-05-04 PROCEDURE — 0DBL8ZX EXCISION OF TRANSVERSE COLON, VIA NATURAL OR ARTIFICIAL OPENING ENDOSCOPIC, DIAGNOSTIC: ICD-10-PCS | Performed by: INTERNAL MEDICINE

## 2021-05-04 PROCEDURE — 0DBK8ZX EXCISION OF ASCENDING COLON, VIA NATURAL OR ARTIFICIAL OPENING ENDOSCOPIC, DIAGNOSTIC: ICD-10-PCS | Performed by: INTERNAL MEDICINE

## 2021-05-04 RX ORDER — LIDOCAINE HYDROCHLORIDE 10 MG/ML
INJECTION, SOLUTION EPIDURAL; INFILTRATION; INTRACAUDAL; PERINEURAL AS NEEDED
Status: DISCONTINUED | OUTPATIENT
Start: 2021-05-04 | End: 2021-05-04 | Stop reason: SURG

## 2021-05-04 RX ORDER — DEXTROSE MONOHYDRATE 25 G/50ML
50 INJECTION, SOLUTION INTRAVENOUS
Status: DISCONTINUED | OUTPATIENT
Start: 2021-05-04 | End: 2021-05-04 | Stop reason: HOSPADM

## 2021-05-04 RX ORDER — SODIUM CHLORIDE, SODIUM LACTATE, POTASSIUM CHLORIDE, CALCIUM CHLORIDE 600; 310; 30; 20 MG/100ML; MG/100ML; MG/100ML; MG/100ML
INJECTION, SOLUTION INTRAVENOUS CONTINUOUS
Status: DISCONTINUED | OUTPATIENT
Start: 2021-05-04 | End: 2021-05-05

## 2021-05-04 RX ORDER — SODIUM CHLORIDE, SODIUM LACTATE, POTASSIUM CHLORIDE, CALCIUM CHLORIDE 600; 310; 30; 20 MG/100ML; MG/100ML; MG/100ML; MG/100ML
INJECTION, SOLUTION INTRAVENOUS CONTINUOUS PRN
Status: DISCONTINUED | OUTPATIENT
Start: 2021-05-04 | End: 2021-05-04 | Stop reason: SURG

## 2021-05-04 RX ORDER — NALOXONE HYDROCHLORIDE 0.4 MG/ML
80 INJECTION, SOLUTION INTRAMUSCULAR; INTRAVENOUS; SUBCUTANEOUS AS NEEDED
Status: DISCONTINUED | OUTPATIENT
Start: 2021-05-04 | End: 2021-05-04 | Stop reason: HOSPADM

## 2021-05-04 RX ADMIN — LIDOCAINE HYDROCHLORIDE 40 MG: 10 INJECTION, SOLUTION EPIDURAL; INFILTRATION; INTRACAUDAL; PERINEURAL at 14:17:00

## 2021-05-04 RX ADMIN — SODIUM CHLORIDE, SODIUM LACTATE, POTASSIUM CHLORIDE, CALCIUM CHLORIDE: 600; 310; 30; 20 INJECTION, SOLUTION INTRAVENOUS at 14:53:00

## 2021-05-04 RX ADMIN — SODIUM CHLORIDE, SODIUM LACTATE, POTASSIUM CHLORIDE, CALCIUM CHLORIDE: 600; 310; 30; 20 INJECTION, SOLUTION INTRAVENOUS at 14:17:00

## 2021-05-04 NOTE — PLAN OF CARE
1 BM in AM prior to procedure, no blood noted. Patient went for colonoscopy in afternoon. Doing well after arriving back to unit. Denies pain and denies nausea. Restarted on general diet, tolerating well. Voiding freely.  Ambulating with standby assistzhanna consult  -Tests as ordered   - See additional Care Plan goals for specific interventions  Outcome: Progressing     Problem: SAFETY ADULT - FALL  Goal: Free from fall injury  Description: INTERVENTIONS:  - Assess pt frequently for physical needs  - Identify ordered and appropriate  - Administer supportive blood products/factors as ordered and appropriate  Outcome: Progressing  Goal: Free from bleeding injury  Description: (Example usage: patient with low platelets)  INTERVENTIONS:  - Avoid intramuscular injec

## 2021-05-04 NOTE — CM/SW NOTE
Pt RN informed SW that pt is interested in Meals on Wheels. SW made referral to [facility] to see if pt is eligible for these or any other services. SW/CM to remain available for support and/or discharge planning.      Nolan Herrera MSW, LSW  Social Work   E

## 2021-05-04 NOTE — PLAN OF CARE
No acute medical changes during the shift. Patient is A&O x4. Denies pain. Ambulating independently. NPO since midnight for colonscopy today; Accuchecks now q6 hours. Voiding freely.  Patient finished drinking golytle and producing BM during the night; no b consult  -Tests as ordered   - See additional Care Plan goals for specific interventions  Outcome: Progressing     Problem: SAFETY ADULT - FALL  Goal: Free from fall injury  Description: INTERVENTIONS:  - Assess pt frequently for physical needs  - Identify ordered and appropriate  - Administer supportive blood products/factors as ordered and appropriate  Outcome: Progressing  Goal: Free from bleeding injury  Description: (Example usage: patient with low platelets)  INTERVENTIONS:  - Avoid intramuscular injec

## 2021-05-04 NOTE — OPERATIVE REPORT
[facility] AND [facility]  Colonoscopy Report      Jaida Son Patient Status:  Observation    3/20/1935 MRN [id number]   Location [facility] ENDOSCOPY LAB SUITES Attending Naomy Roy MD       DATE OF OPERATION: 2021     PREOPERATIVE Ofe Cordova identified. The patient tolerated the procedure well without any immediate complications.     Aronchick bowel prep score was 2. (1 - excellent > 95% mucosa seen; 2 - good - clear liquid up to 25% of the mucosa, 90% mucosa seen;  3 - fair - semisolid stoo

## 2021-05-04 NOTE — PROGRESS NOTES
[facility]    Progress Note    Samia Charlton Patient Status:  Observation    3/20/1935 MRN [id number]   Location [facility] 4W/SW/SE Attending Ashu Barney MD   Hosp Day # 0 PCP Cornelius Bruce MD     HPI/Subjective:    Reid II diabetes mellitus ([facility] [address] -will cover the sliding scale.   Monitor blood sugars      Anemia most likely from GI bleed-monitor hemoglobin.-Has been stable       Hyperlipidemia -continue statins.     History of peripheral neuropathy-continue gabapentin     B

## 2021-05-04 NOTE — ANESTHESIA POSTPROCEDURE EVALUATION
Patient: Shawna Vences    Procedure Summary     Date: 05/04/21 Room / Location: [facility] ENDOSCOPY 04 / [facility] ENDOSCOPY    Anesthesia Start: 4987 Anesthesia Stop:     Procedure: COLONOSCOPY (N/A ) Diagnosis:       Hematochezia      (diverticulosis, polyps)

## 2021-05-04 NOTE — ANESTHESIA PREPROCEDURE EVALUATION
Anesthesia PreOp Note    HPI:     Paola Carr is a 81year old male who presents for preoperative consultation requested by: Isela Faith MD    Date of Surgery: 5/2/2021 - 5/4/2021    Procedure(s):  COLONOSCOPY  Indication: hematochezia    Relevant Oral Tab, TAKE 1 TABLET BY MOUTH AT NIGHT, Disp: 90 tablet, Rfl: 1, 5/1/2021  metFORMIN HCl  MG Oral Tablet 24 Hr, Take 1 tablet (500 mg total) by mouth daily. , Disp: 90 tablet, Rfl: 1, 5/3/2021 at Unknown time  tamsulosin (FLOMAX) cap, Take 1 capsul MG/ML injection 1 mg, 1 mg, Intravenous, Q2H PRN, Nyla Toribio MD   Or  morphINE sulfate (PF) 2 MG/ML injection 2 mg, 2 mg, Intravenous, Q2H PRN, Nga Howard MD   Or  morphINE sulfate (PF) 4 MG/ML injection 4 mg, 4 mg, Intravenous, Q2H PRN,  Alcohol use: No      Drug use: No      Sexual activity: Not on file    Other Topics      Concerns:         Service: Not Asked        Blood Transfusions: Not Asked        Caffeine Concern: Yes        Occupational Exposure: Not Asked        Yen Mabry GLU 94 05/02/2021    PGLU 89 05/04/2021    CA 9.1 05/02/2021     Lab Results   Component Value Date    INR 1.09 05/02/2021       Vital Signs: Body mass index is 25.88 kg/m². height is 1.727 m (5' 8\") and weight is 77.2 kg (170 lb 3.2 oz).  His oral t

## 2021-05-05 VITALS
OXYGEN SATURATION: 97 % | RESPIRATION RATE: 20 BRPM | TEMPERATURE: 98 F | HEIGHT: 68 IN | DIASTOLIC BLOOD PRESSURE: 78 MMHG | HEART RATE: 55 BPM | WEIGHT: 170.19 LBS | SYSTOLIC BLOOD PRESSURE: 132 MMHG | BODY MASS INDEX: 25.79 KG/M2

## 2021-05-05 PROCEDURE — 99217 OBSERVATION CARE DISCHARGE: CPT | Performed by: INTERNAL MEDICINE

## 2021-05-05 NOTE — PLAN OF CARE
No acute medical changes during the shift. Patient is A&O x4. Ambulating independently. General carb controlled diet without nausea/ vomiting. Accuchecks ACHS. Voiding freely. Remote tele in place.  Patient started on metamucil last night and tolerated well Short Term Goal  Description: Patient's Short Term Goal: to have no bleeding when I have bowel movements     Interventions:   -GI consult  -Tests as ordered   - See additional Care Plan goals for specific interventions  Outcome: Progressing     Problem: SA hemorrhage  - Monitor labs and vital signs for trends  - Administer supportive blood products/factors, fluids and medications as ordered and appropriate  - Administer supportive blood products/factors as ordered and appropriate  Outcome: Progressing  Goal:

## 2021-05-05 NOTE — PROGRESS NOTES
[facility] AND CLINICS  GI Progress Note      Zaina Pagan Patient Status:  Observation    3/20/1935 MRN [id number]   Location [facility] 4W/SW/SE Attending Debby Garcia MD   Hosp Day # 0 PCP Delta Villegas MD          SUBJECTIVE:     No blee

## 2021-05-05 NOTE — DISCHARGE SUMMARY
[facility]    Discharge Summary    Gabi Bocanegra Patient Status:  Observation    3/20/1935 MRN [id number]   Location [facility] 4W/SW/SE Attending Richelle Keen MD   Hosp Day # 0 PCP Claudia García MD     Date of Admission: S1, S2.  Regular rate and rhythm. No murmurs. Equal pulses   Abdomen: Soft, nontender, nondistended. Positive bowel sounds. No rebound tenderness  Neurologic: No focal neurological deficits. Musculoskeletal: Motor strength 5/5  Integument: No lesions.  Epimenio Ryan mg total) by mouth daily.     LISINOPRIL-HYDROCHLOROTHIAZIDE 20-12.5 MG Oral Tab  TAKE 2 TABLETS BY MOUTH EVERY DAY    AMLODIPINE BESYLATE 5 MG Oral Tab  TAKE 1 TABLET BY MOUTH DAILY    GABAPENTIN 300 MG Oral Cap  TAKE 2 CAPSULES BY MOUTH NIGHTLY    aspirin

## 2021-05-05 NOTE — PLAN OF CARE
Alert and orientated. Vitals stable. Tolerating general diet. Accucheck ACHS. No coverage needed. Voiding freely. Ambulating independently. Plan to discharge home today. IV removed. Discharge instructions given. Educated on diet.  Spoke with social work reg Plan goals for specific interventions  Outcome: Adequate for Discharge     Problem: SAFETY ADULT - FALL  Goal: Free from fall injury  Description: INTERVENTIONS:  - Assess pt frequently for physical needs  - Identify cognitive and physical deficits and beh appropriate  - Administer supportive blood products/factors as ordered and appropriate  Outcome: Adequate for Discharge  Goal: Free from bleeding injury  Description: (Example usage: patient with low platelets)  INTERVENTIONS:  - Avoid intramuscular inject

## 2021-05-06 ENCOUNTER — PATIENT OUTREACH (OUTPATIENT)
Dept: CASE MANAGEMENT | Age: 86
End: 2021-05-06

## 2021-05-06 DIAGNOSIS — D50.0 ANEMIA DUE TO BLOOD LOSS: ICD-10-CM

## 2021-05-06 DIAGNOSIS — Z02.9 ENCOUNTERS FOR UNSPECIFIED ADMINISTRATIVE PURPOSE: ICD-10-CM

## 2021-05-06 DIAGNOSIS — K62.5 RECTAL BLEEDING: ICD-10-CM

## 2021-05-06 DIAGNOSIS — E11.9 TYPE 2 DIABETES MELLITUS WITHOUT COMPLICATION, WITHOUT LONG-TERM CURRENT USE OF INSULIN (HCC): ICD-10-CM

## 2021-05-06 PROCEDURE — 1111F DSCHRG MED/CURRENT MED MERGE: CPT

## 2021-05-06 NOTE — PROGRESS NOTES
Initial Post Discharge Follow Up   Discharge Date: 5/5/21  Contact Date: 5/6/2021    Consent Verification:  Assessment Completed With: Patient  HIPAA Verified?   Yes    Discharge Dx:     Principal Problem:    Rectal bleeding  Active Problems:    Hyperten you? yes  • How well were your discharge instructions explained to you?   o On a scale of 1-5   1- Very Poor and 5- Very well   o Very well  • Do you have any questions about your discharge instructions?   No  • Before leaving the hospital was your diagnose having any concerns with constipation? No  Referrals/orders at D/C:  Home Health/Services ordered at D/C? No     DME ordered at D/C? No    Needs post D/C:   Now that you are home, are there any needs or concerns you need addressed before your next visit wi prior to administering the vaccine. To save time, please complete the appropriate document below and bring it with you.     COVID-19 Vaccine Consent Form  [email or web address] understanding of these. [facility] instructed patient to call PCP with any questions or needs, he states he will.  [facility] also provided patient with contact number for [facility] so that patient can find out what services he may be eligible

## 2021-05-07 ENCOUNTER — IMMUNIZATION (OUTPATIENT)
Dept: LAB | Facility: HOSPITAL | Age: 86
End: 2021-05-07
Attending: EMERGENCY MEDICINE
Payer: MEDICARE

## 2021-05-07 DIAGNOSIS — Z23 NEED FOR VACCINATION: Primary | ICD-10-CM

## 2021-05-07 PROCEDURE — 0012A SARSCOV2 VAC 100MCG/0.5ML IM: CPT

## 2021-05-10 ENCOUNTER — OFFICE VISIT (OUTPATIENT)
Dept: INTERNAL MEDICINE CLINIC | Facility: CLINIC | Age: 86
End: 2021-05-10
Payer: MEDICARE

## 2021-05-10 VITALS
HEIGHT: 68 IN | WEIGHT: 174 LBS | OXYGEN SATURATION: 98 % | RESPIRATION RATE: 14 BRPM | DIASTOLIC BLOOD PRESSURE: 70 MMHG | SYSTOLIC BLOOD PRESSURE: 120 MMHG | HEART RATE: 84 BPM | BODY MASS INDEX: 26.37 KG/M2

## 2021-05-10 DIAGNOSIS — K57.91 GASTROINTESTINAL HEMORRHAGE ASSOCIATED WITH INTESTINAL DIVERTICULOSIS: Primary | ICD-10-CM

## 2021-05-10 PROCEDURE — 3078F DIAST BP <80 MM HG: CPT | Performed by: INTERNAL MEDICINE

## 2021-05-10 PROCEDURE — 3074F SYST BP LT 130 MM HG: CPT | Performed by: INTERNAL MEDICINE

## 2021-05-10 PROCEDURE — 99495 TRANSJ CARE MGMT MOD F2F 14D: CPT | Performed by: INTERNAL MEDICINE

## 2021-05-10 PROCEDURE — 3008F BODY MASS INDEX DOCD: CPT | Performed by: INTERNAL MEDICINE

## 2021-05-10 NOTE — PROGRESS NOTES
HPI:    Gil Mendez is a 81year old male here today for hospital follow up.    He was discharged from Inpatient hospital, [facility]  to Home   Admission Date: 5/2/21   Discharge Date: 5/5/21  Hospital Discharge Diagnoses (since 4/10/2021) ambulated in the room without any complaints. He has been cleared by gastroenterology for discharge. He is here for follow-up post hospitalization. He has no more recurrence of bleeding. He reports that he is doing well.   He denies any chest pain, sh ROS:   Review of Systems   Constitutional: Negative for activity change, appetite change and fever. HENT: Negative for congestion and voice change. Respiratory: Negative for cough and shortness of breath.     Cardiovascular: Negative for chest pain normal.          ASSESSMENT/ PLAN:   There are no diagnoses linked to this encounter. Acute GI bleed from diverticulosis-colonoscopy done  A. Ascending colon polyps x2:   · Sessile serrated adenoma. · Tubular adenoma.     B.  Hepatic flexure polyp:   · Tu

## 2021-06-25 RX ORDER — LISINOPRIL AND HYDROCHLOROTHIAZIDE 20; 12.5 MG/1; MG/1
TABLET ORAL
Qty: 180 TABLET | Refills: 1 | Status: SHIPPED | OUTPATIENT
Start: 2021-06-25 | End: 2021-12-22

## 2021-07-26 ENCOUNTER — OFFICE VISIT (OUTPATIENT)
Dept: INTERNAL MEDICINE CLINIC | Facility: CLINIC | Age: 86
End: 2021-07-26
Payer: MEDICARE

## 2021-07-26 VITALS
OXYGEN SATURATION: 98 % | SYSTOLIC BLOOD PRESSURE: 131 MMHG | HEIGHT: 68 IN | BODY MASS INDEX: 25.46 KG/M2 | RESPIRATION RATE: 14 BRPM | HEART RATE: 80 BPM | DIASTOLIC BLOOD PRESSURE: 65 MMHG | WEIGHT: 168 LBS

## 2021-07-26 DIAGNOSIS — M51.37 DEGENERATION OF LUMBAR OR LUMBOSACRAL INTERVERTEBRAL DISC: ICD-10-CM

## 2021-07-26 DIAGNOSIS — Z87.19 HISTORY OF DIVERTICULOSIS: ICD-10-CM

## 2021-07-26 DIAGNOSIS — N52.8 OTHER MALE ERECTILE DYSFUNCTION: ICD-10-CM

## 2021-07-26 DIAGNOSIS — I10 HYPERTENSION, BENIGN: ICD-10-CM

## 2021-07-26 DIAGNOSIS — G60.9 HEREDITARY AND IDIOPATHIC PERIPHERAL NEUROPATHY: ICD-10-CM

## 2021-07-26 DIAGNOSIS — Z00.00 ADULT GENERAL MEDICAL EXAM: ICD-10-CM

## 2021-07-26 DIAGNOSIS — E11.9 TYPE 2 DIABETES MELLITUS WITHOUT COMPLICATION, WITHOUT LONG-TERM CURRENT USE OF INSULIN (HCC): ICD-10-CM

## 2021-07-26 DIAGNOSIS — D64.9 ANEMIA, UNSPECIFIED TYPE: ICD-10-CM

## 2021-07-26 DIAGNOSIS — N40.0 BENIGN PROSTATIC HYPERPLASIA WITHOUT LOWER URINARY TRACT SYMPTOMS: ICD-10-CM

## 2021-07-26 DIAGNOSIS — D50.0 ANEMIA DUE TO BLOOD LOSS: ICD-10-CM

## 2021-07-26 DIAGNOSIS — E78.5 HYPERLIPIDEMIA, UNSPECIFIED HYPERLIPIDEMIA TYPE: Primary | ICD-10-CM

## 2021-07-26 DIAGNOSIS — M96.1 POSTLAMINECTOMY SYNDROME, LUMBAR REGION: ICD-10-CM

## 2021-07-26 PROBLEM — K62.5 RECTAL BLEEDING: Status: RESOLVED | Noted: 2021-05-02 | Resolved: 2021-07-26

## 2021-07-26 PROBLEM — Z79.82 ASPIRIN LONG-TERM USE: Status: RESOLVED | Noted: 2021-05-03 | Resolved: 2021-07-26

## 2021-07-26 PROCEDURE — 99397 PER PM REEVAL EST PAT 65+ YR: CPT | Performed by: INTERNAL MEDICINE

## 2021-07-26 PROCEDURE — 3075F SYST BP GE 130 - 139MM HG: CPT | Performed by: INTERNAL MEDICINE

## 2021-07-26 PROCEDURE — 96160 PT-FOCUSED HLTH RISK ASSMT: CPT | Performed by: INTERNAL MEDICINE

## 2021-07-26 PROCEDURE — 3008F BODY MASS INDEX DOCD: CPT | Performed by: INTERNAL MEDICINE

## 2021-07-26 PROCEDURE — G0439 PPPS, SUBSEQ VISIT: HCPCS | Performed by: INTERNAL MEDICINE

## 2021-07-26 PROCEDURE — 3078F DIAST BP <80 MM HG: CPT | Performed by: INTERNAL MEDICINE

## 2021-07-26 NOTE — PROGRESS NOTES
HPI:   Italo Trevizo is a 81year old male who presents for a Medicare Subsequent Annual Wellness visit (Pt already had Initial Annual Wellness).     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     He  has a past surgical history that includes laminectomy encounter: 168 lb (76.2 kg).   Medicare Hearing Assessment  (Required for AWV/SWV)    Hearing Screening    Time taken: 7/26/2021 10:44 AM  Screening Method: Questionnaire  I have a problem hearing over the telephone: No I have trouble following the conversa Neck:      Vascular: No carotid bruit. Cardiovascular:      Rate and Rhythm: Normal rate and regular rhythm. Pulses: Normal pulses. Heart sounds: Normal heart sounds. No murmur heard.      Pulmonary:      Effort: Pulmonary effort is normal. No unspecified hyperlipidemia type -continue statins. Lipid profile LFT has been good. Hypertension, benign-blood pressure has been running excellent.     Type 2 diabetes mellitus without complication, without long-term current use of insulin ([facility] [address] -his la issues and agrees to the plan. Reinforced healthy diet, lifestyle, and exercise. No follow-ups on file.      Leydi Lam MD, 7/26/2021     General Health     In the past six months, have you lost more than 10 pounds without trying?: 2 - No  Has your only need ONE of the following:    Colonoscopy   Covered every 10 years    Covered every 2 years if patient is at high risk or previous colonoscopy was abnormal 05/04/2021    No recommendations at this time    Flexible Sigmoidoscopy   Covered every 4 years Creatinine   Annually Lab Results   Component Value Date    CREATSERUM 1.16 05/02/2021         BUN Annually Lab Results   Component Value Date    BUN 19 (H) 05/02/2021       Drug Serum Conc Annually No results found for: DIGOXIN, DIG, VALP

## 2021-07-31 RX ORDER — AMLODIPINE BESYLATE 5 MG/1
TABLET ORAL
Qty: 90 TABLET | Refills: 3 | Status: SHIPPED | OUTPATIENT
Start: 2021-07-31

## 2021-09-20 ENCOUNTER — LAB ENCOUNTER (OUTPATIENT)
Dept: LAB | Facility: HOSPITAL | Age: 86
End: 2021-09-20
Attending: INTERNAL MEDICINE
Payer: MEDICARE

## 2021-09-20 ENCOUNTER — OFFICE VISIT (OUTPATIENT)
Dept: INTERNAL MEDICINE CLINIC | Facility: CLINIC | Age: 86
End: 2021-09-20
Payer: MEDICARE

## 2021-09-20 ENCOUNTER — NURSE TRIAGE (OUTPATIENT)
Dept: INTERNAL MEDICINE CLINIC | Facility: CLINIC | Age: 86
End: 2021-09-20

## 2021-09-20 VITALS
RESPIRATION RATE: 20 BRPM | SYSTOLIC BLOOD PRESSURE: 122 MMHG | BODY MASS INDEX: 25.16 KG/M2 | DIASTOLIC BLOOD PRESSURE: 78 MMHG | TEMPERATURE: 98 F | HEIGHT: 68 IN | WEIGHT: 166 LBS | HEART RATE: 80 BPM

## 2021-09-20 DIAGNOSIS — R31.9 HEMATURIA, UNSPECIFIED TYPE: ICD-10-CM

## 2021-09-20 DIAGNOSIS — R31.9 HEMATURIA, UNSPECIFIED TYPE: Primary | ICD-10-CM

## 2021-09-20 LAB
ANION GAP SERPL CALC-SCNC: 3 MMOL/L (ref 0–18)
BASOPHILS # BLD AUTO: 0.03 X10(3) UL (ref 0–0.2)
BASOPHILS NFR BLD AUTO: 0.6 %
BILIRUBIN: NEGATIVE
BUN BLD-MCNC: 16 MG/DL (ref 7–18)
BUN/CREAT SERPL: 12.8 (ref 10–20)
CALCIUM BLD-MCNC: 9.5 MG/DL (ref 8.5–10.1)
CHLORIDE SERPL-SCNC: 106 MMOL/L (ref 98–112)
CO2 SERPL-SCNC: 31 MMOL/L (ref 21–32)
CREAT BLD-MCNC: 1.25 MG/DL
DEPRECATED RDW RBC AUTO: 45.1 FL (ref 35.1–46.3)
EOSINOPHIL # BLD AUTO: 0.1 X10(3) UL (ref 0–0.7)
EOSINOPHIL NFR BLD AUTO: 2.1 %
ERYTHROCYTE [DISTWIDTH] IN BLOOD BY AUTOMATED COUNT: 15.1 % (ref 11–15)
GLUCOSE (URINE DIPSTICK): NEGATIVE MG/DL
GLUCOSE BLD-MCNC: 102 MG/DL (ref 70–99)
HCT VFR BLD AUTO: 37.3 %
HGB BLD-MCNC: 11.7 G/DL
IMM GRANULOCYTES # BLD AUTO: 0.01 X10(3) UL (ref 0–1)
IMM GRANULOCYTES NFR BLD: 0.2 %
KETONES (URINE DIPSTICK): NEGATIVE MG/DL
LEUKOCYTES: NEGATIVE
LYMPHOCYTES # BLD AUTO: 1.52 X10(3) UL (ref 1–4)
LYMPHOCYTES NFR BLD AUTO: 31.2 %
MCH RBC QN AUTO: 25.7 PG (ref 26–34)
MCHC RBC AUTO-ENTMCNC: 31.4 G/DL (ref 31–37)
MCV RBC AUTO: 81.8 FL
MONOCYTES # BLD AUTO: 0.48 X10(3) UL (ref 0.1–1)
MONOCYTES NFR BLD AUTO: 9.9 %
MULTISTIX LOT#: 5077 NUMERIC
NEUTROPHILS # BLD AUTO: 2.73 X10 (3) UL (ref 1.5–7.7)
NEUTROPHILS # BLD AUTO: 2.73 X10(3) UL (ref 1.5–7.7)
NEUTROPHILS NFR BLD AUTO: 56 %
NITRITE, URINE: NEGATIVE
OSMOLALITY SERPL CALC.SUM OF ELEC: 291 MOSM/KG (ref 275–295)
PATIENT FASTING Y/N/NP: NO
PH, URINE: 8 (ref 4.5–8)
PLATELET # BLD AUTO: 223 10(3)UL (ref 150–450)
POTASSIUM SERPL-SCNC: 3.8 MMOL/L (ref 3.5–5.1)
PSA SERPL-MCNC: 14.4 NG/ML (ref ?–4)
RBC # BLD AUTO: 4.56 X10(6)UL
SODIUM SERPL-SCNC: 140 MMOL/L (ref 136–145)
SPECIFIC GRAVITY: 1.01 (ref 1–1.03)
UROBILINOGEN,SEMI-QN: 0.2 MG/DL (ref 0–1.9)
WBC # BLD AUTO: 4.9 X10(3) UL (ref 4–11)

## 2021-09-20 PROCEDURE — 84153 ASSAY OF PSA TOTAL: CPT

## 2021-09-20 PROCEDURE — 3078F DIAST BP <80 MM HG: CPT | Performed by: INTERNAL MEDICINE

## 2021-09-20 PROCEDURE — 3074F SYST BP LT 130 MM HG: CPT | Performed by: INTERNAL MEDICINE

## 2021-09-20 PROCEDURE — 3008F BODY MASS INDEX DOCD: CPT | Performed by: INTERNAL MEDICINE

## 2021-09-20 PROCEDURE — 80048 BASIC METABOLIC PNL TOTAL CA: CPT

## 2021-09-20 PROCEDURE — 81002 URINALYSIS NONAUTO W/O SCOPE: CPT | Performed by: INTERNAL MEDICINE

## 2021-09-20 PROCEDURE — 99214 OFFICE O/P EST MOD 30 MIN: CPT | Performed by: INTERNAL MEDICINE

## 2021-09-20 PROCEDURE — 85025 COMPLETE CBC W/AUTO DIFF WBC: CPT

## 2021-09-20 PROCEDURE — 36415 COLL VENOUS BLD VENIPUNCTURE: CPT

## 2021-09-20 RX ORDER — METFORMIN HYDROCHLORIDE 500 MG/1
500 TABLET, EXTENDED RELEASE ORAL
COMMUNITY
Start: 2021-07-01 | End: 2021-10-31

## 2021-09-20 NOTE — PROGRESS NOTES
HPI:    Patient ID: Brittany Tejeda is a 81year old male. HPI  Patient is here complaining of blood in the urine. He is typically seen by Dr. Katharine Morgan.  Multiple chronic medical issues as listed below.   Last seen by his usual doctor earlier in Former Smoker        Types: Cigarettes      Smokeless tobacco: Never Used    Vaping Use      Vaping Use: Never used    Alcohol use: No    Drug use: No         Review of Systems          Current Outpatient Medications   Medication Sig Dispense Refill   • me unspecified type  New onset gross hematuria an 81-year-old male. Discussed possible etiologies including kidney stone, infection, cancer, or other cause. UA does not point to any infection. Check blood work today.   Given order for CT urogram to get done

## 2021-09-20 NOTE — TELEPHONE ENCOUNTER
Action Requested: Summary for Provider     []  Critical Lab, Recommendations Needed  [] Need Additional Advice  []   FYI    []   Need Orders  [] Need Medications Sent to Pharmacy  []  Other     SUMMARY: Patient wanting appt today and scheduled for 2 pm wit

## 2021-09-30 ENCOUNTER — OFFICE VISIT (OUTPATIENT)
Dept: SURGERY | Facility: CLINIC | Age: 86
End: 2021-09-30
Payer: MEDICARE

## 2021-09-30 VITALS
HEART RATE: 81 BPM | WEIGHT: 166 LBS | SYSTOLIC BLOOD PRESSURE: 137 MMHG | HEIGHT: 68 IN | BODY MASS INDEX: 25.16 KG/M2 | DIASTOLIC BLOOD PRESSURE: 74 MMHG

## 2021-09-30 DIAGNOSIS — R31.0 GROSS HEMATURIA: Primary | ICD-10-CM

## 2021-09-30 DIAGNOSIS — R97.20 ELEVATED PSA: ICD-10-CM

## 2021-09-30 LAB
BILIRUB UR QL: NEGATIVE
CLARITY UR: CLEAR
COLOR UR: YELLOW
GLUCOSE UR-MCNC: NEGATIVE MG/DL
KETONES UR-MCNC: NEGATIVE MG/DL
LEUKOCYTE ESTERASE UR QL STRIP.AUTO: NEGATIVE
NITRITE UR QL STRIP.AUTO: NEGATIVE
PH UR: 6 [PH] (ref 5–8)
PROT UR-MCNC: 30 MG/DL
RBC #/AREA URNS AUTO: >10 /HPF
SP GR UR STRIP: 1.02 (ref 1–1.03)
UROBILINOGEN UR STRIP-ACNC: <2

## 2021-09-30 PROCEDURE — 3075F SYST BP GE 130 - 139MM HG: CPT | Performed by: NURSE PRACTITIONER

## 2021-09-30 PROCEDURE — 99204 OFFICE O/P NEW MOD 45 MIN: CPT | Performed by: NURSE PRACTITIONER

## 2021-09-30 PROCEDURE — 3078F DIAST BP <80 MM HG: CPT | Performed by: NURSE PRACTITIONER

## 2021-09-30 PROCEDURE — 3008F BODY MASS INDEX DOCD: CPT | Performed by: NURSE PRACTITIONER

## 2021-09-30 NOTE — PROGRESS NOTES
Subjective:     Patient ID: Italo Trevizo is a 81year old male. HPI     Patient is a 81year old male who presents to the clinic for a consult at the request of, and a copy of this note will be sent to, Dr. Leydi Lam regarding gross hematuria. Propionate 50 MCG/ACT Nasal Suspension INHALE 1 SPRAY BY NASAL ROUTE DAILY 1 Bottle 0   • GABAPENTIN 300 MG Oral Cap TAKE 2 CAPSULES BY MOUTH NIGHTLY 180 capsule 3   • aspirin 81 MG Oral Tab EC Take 81 mg by mouth daily.          Allergies:No Known Allergie Psychiatric:         Mood and Affect: Mood normal.         Behavior: Behavior normal.         Thought Content:  Thought content normal.         Judgment: Judgment normal.         Assessment & Plan:   Gross hematuria  (primary encounter diagnosis)  Elevate

## 2021-09-30 NOTE — PATIENT INSTRUCTIONS
1.  I will send urine today for UA, urine culture, and urine cytology. We will notify you of these results. 2.  Please call and schedule your CT urogram.  You can do this by calling [phone]. Please get this done within the next couple weeks.   Cleatrice Reasons

## 2021-10-01 ENCOUNTER — LAB ENCOUNTER (OUTPATIENT)
Dept: LAB | Facility: HOSPITAL | Age: 86
End: 2021-10-01
Attending: NURSE PRACTITIONER
Payer: MEDICARE

## 2021-10-01 DIAGNOSIS — R97.20 ELEVATED PSA: ICD-10-CM

## 2021-10-01 PROCEDURE — 84154 ASSAY OF PSA FREE: CPT

## 2021-10-01 PROCEDURE — 36415 COLL VENOUS BLD VENIPUNCTURE: CPT

## 2021-10-01 PROCEDURE — 84153 ASSAY OF PSA TOTAL: CPT

## 2021-10-06 ENCOUNTER — HOSPITAL ENCOUNTER (OUTPATIENT)
Dept: CT IMAGING | Facility: HOSPITAL | Age: 86
Discharge: HOME | End: 2021-10-06
Attending: INTERNAL MEDICINE
Payer: MEDICARE

## 2021-10-06 DIAGNOSIS — R31.9 HEMATURIA, UNSPECIFIED TYPE: ICD-10-CM

## 2021-10-06 PROCEDURE — 76377 3D RENDER W/INTRP POSTPROCES: CPT | Performed by: INTERNAL MEDICINE

## 2021-10-06 PROCEDURE — 74178 CT ABD&PLV WO CNTR FLWD CNTR: CPT | Performed by: INTERNAL MEDICINE

## 2021-10-07 ENCOUNTER — TELEPHONE (OUTPATIENT)
Dept: CARDIOLOGY CLINIC | Facility: CLINIC | Age: 86
End: 2021-10-07

## 2021-10-07 NOTE — TELEPHONE ENCOUNTER
Patient dropped off Certification for parking placard form. Placing in [employer]. Please call patient when form is done. Patient will  when completed.  Contact #[phone]

## 2021-10-08 ENCOUNTER — MED REC SCAN ONLY (OUTPATIENT)
Dept: INTERNAL MEDICINE CLINIC | Facility: CLINIC | Age: 86
End: 2021-10-08

## 2021-10-08 ENCOUNTER — TELEPHONE (OUTPATIENT)
Dept: SURGERY | Facility: CLINIC | Age: 86
End: 2021-10-08

## 2021-10-08 NOTE — TELEPHONE ENCOUNTER
Called patient spoke with patient advised of below message from LILIAN Mei of evelated PSA level 14.5 advised to keep scheduled appt with [facility] for 11/5 cysto and schedule prostate biopsy.  Will schedule biopsy day of cysto to go over instructions with patie

## 2021-10-08 NOTE — TELEPHONE ENCOUNTER
----- Message from PRASHANTH Laboy sent at 10/7/2021 12:45 PM CDT -----  Please let patient know his PSA remains elevated at 14.50 ng/ml. At his last visit I discussed if this remains elevated I would recommend TRUS guided prostate biopsy.   I di

## 2021-10-08 NOTE — TELEPHONE ENCOUNTER
Tried to reach pt at the cell # listed and had to [place] also tried the [facility] # and the mailbox was full.

## 2021-10-15 NOTE — TELEPHONE ENCOUNTER
Spoke to Pt and notified form is ready for  at the [facility] office . Pt verbalized understanding denied questions .

## 2021-10-31 RX ORDER — LOVASTATIN 20 MG/1
TABLET ORAL
Qty: 90 TABLET | Refills: 1 | Status: SHIPPED | OUTPATIENT
Start: 2021-10-31

## 2021-10-31 RX ORDER — METFORMIN HYDROCHLORIDE 500 MG/1
TABLET, EXTENDED RELEASE ORAL
Qty: 90 TABLET | Refills: 0 | Status: SHIPPED | OUTPATIENT
Start: 2021-10-31 | End: 2022-01-31

## 2021-11-01 ENCOUNTER — TELEPHONE (OUTPATIENT)
Dept: SURGERY | Facility: CLINIC | Age: 86
End: 2021-11-01

## 2021-11-01 ENCOUNTER — LAB ENCOUNTER (OUTPATIENT)
Dept: LAB | Facility: HOSPITAL | Age: 86
End: 2021-11-01
Attending: NURSE PRACTITIONER
Payer: MEDICARE

## 2021-11-01 DIAGNOSIS — R97.20 ELEVATED PSA: ICD-10-CM

## 2021-11-01 PROCEDURE — 36415 COLL VENOUS BLD VENIPUNCTURE: CPT

## 2021-11-01 PROCEDURE — 84153 ASSAY OF PSA TOTAL: CPT

## 2021-11-01 NOTE — TELEPHONE ENCOUNTER
Patient is currently at Lab in [facility] and lives in [facility]. States he is suppose to have lab PSA orders done and they don't have the orders for patient.  Please advise

## 2021-11-04 NOTE — PROGRESS NOTES
Called and discussed PSA results with patient. Recommended proceeding with prostate biopsy to further evaluation. Discussed possible risks including bleeding, infection, and urinary retention.   Patient is scheduled for a cystoscopy tomorrow for work up o

## 2021-11-05 ENCOUNTER — PROCEDURE (OUTPATIENT)
Dept: SURGERY | Facility: CLINIC | Age: 86
End: 2021-11-05
Payer: MEDICARE

## 2021-11-05 VITALS
WEIGHT: 166 LBS | BODY MASS INDEX: 25.16 KG/M2 | DIASTOLIC BLOOD PRESSURE: 76 MMHG | SYSTOLIC BLOOD PRESSURE: 135 MMHG | HEART RATE: 88 BPM | HEIGHT: 68 IN | RESPIRATION RATE: 16 BRPM

## 2021-11-05 DIAGNOSIS — R31.0 GROSS HEMATURIA: Primary | ICD-10-CM

## 2021-11-05 DIAGNOSIS — N40.1 BENIGN PROSTATIC HYPERPLASIA WITH URINARY FREQUENCY: ICD-10-CM

## 2021-11-05 DIAGNOSIS — Z79.82 LONG TERM (CURRENT) USE OF ASPIRIN: ICD-10-CM

## 2021-11-05 DIAGNOSIS — R35.0 BENIGN PROSTATIC HYPERPLASIA WITH URINARY FREQUENCY: ICD-10-CM

## 2021-11-05 DIAGNOSIS — N28.1 RENAL CYST: ICD-10-CM

## 2021-11-05 DIAGNOSIS — R97.20 ELEVATED PSA: ICD-10-CM

## 2021-11-05 PROCEDURE — 3078F DIAST BP <80 MM HG: CPT | Performed by: UROLOGY

## 2021-11-05 PROCEDURE — 52000 CYSTOURETHROSCOPY: CPT | Performed by: UROLOGY

## 2021-11-05 PROCEDURE — 3075F SYST BP GE 130 - 139MM HG: CPT | Performed by: UROLOGY

## 2021-11-05 PROCEDURE — 3008F BODY MASS INDEX DOCD: CPT | Performed by: UROLOGY

## 2021-11-05 PROCEDURE — 99214 OFFICE O/P EST MOD 30 MIN: CPT | Performed by: UROLOGY

## 2021-11-05 RX ORDER — CIPROFLOXACIN 500 MG/1
500 TABLET, FILM COATED ORAL ONCE
Status: COMPLETED | OUTPATIENT
Start: 2021-11-05 | End: 2021-11-05

## 2021-11-05 RX ADMIN — CIPROFLOXACIN 500 MG: 500 TABLET, FILM COATED ORAL at 16:07:00

## 2021-11-05 NOTE — TELEPHONE ENCOUNTER
Pt came to the office today I went over the instructions for the prostate bx that I scheduled with Dr. Klever Owusu for December, pt is aware and give the instruction sheet, pt needs antibitocs sent to his pharmacy, Jessica Clements please send, thank you

## 2021-11-05 NOTE — PATIENT INSTRUCTIONS
Rene Washington M.D.    1.  If you see any bloody spotting from the urethra/penis, it should stop with some 1 or 2 days    2. Continue your daily aspirin 81 mg without interruption as you are doing.     3.  If you be performed under local anesthesia and you are not being put to sleep.

## 2021-11-05 NOTE — PROGRESS NOTES
PREOPERATIVE DIAGNOSIS:     Gross Hematuria      POSTOP DIAGNOSIS:               The same;  No tumors, stones, or CIS of the bladder or bladder neck    PROCEDURE:              Cystoscopy              ANESTHESIA:     2% Xylocaine jelly local;  also see above carcinoma; UA RBC = >10. Patient presently denies any gross hematuria, dysuria, or flank pain. Patient is unaware of anything that makes this better or worse. He feels this is stable as he is asymptomatic.      Elevated PSA   Most recent 11/01/2021 PSA = 15 46           IMAGING  10/06/2021 CT Urogram (w+wo) = bilateral kidney cysts; no bladder stones of enhancing bladder lesions; Prostate enlarged;  Right iliac wing, benign bone cyst; grossly unchanged since 02/20/2014; Prior L4-S1 spine fusion.       In light explained possible causes of this increase such as prostate cancer; sexual stimulation right before blood draw; prostatitis; or BPH. On OC, prostate 1-2+ enlarged, no palpable nodules or indurations.  In light of the available information, I fully explaine reasons for, nature of, alternatives to the above treatment options, and I answered questions concerning them; patient understands all of this and decides to proceed with the following:     TREATMENT PLAN :    1.  If you see any bloody spotting from the ur BEFORE THE BIOPSY and continue it until you finish it    7  . You may eat breakfast and lunch on the day of the biopsy because the biopsy will be performed under local anesthesia and you are not being put to sleep.                 Excluding the procedure p

## 2021-11-08 RX ORDER — CEFDINIR 300 MG/1
300 CAPSULE ORAL EVERY 12 HOURS
Qty: 6 CAPSULE | Refills: 0 | Status: SHIPPED | OUTPATIENT
Start: 2021-11-08 | End: 2022-01-26

## 2021-11-08 RX ORDER — CIPROFLOXACIN 500 MG/1
500 TABLET, FILM COATED ORAL 2 TIMES DAILY
Qty: 6 TABLET | Refills: 0 | Status: SHIPPED | OUTPATIENT
Start: 2021-11-08

## 2021-11-09 NOTE — TELEPHONE ENCOUNTER
Patient contacted and was informed that medication was sent to pharmacy. Patient stated he would go get it right away.  Patient stated that he was going to see PCP and to inform him that he would be getting a clearance to move forward to have prostate biops

## 2021-11-10 ENCOUNTER — TELEPHONE (OUTPATIENT)
Dept: SURGERY | Facility: CLINIC | Age: 86
End: 2021-11-10

## 2021-11-10 NOTE — TELEPHONE ENCOUNTER
Per Jelani Magana calling to clarify if pt is supposed to be on both cipro and cefdinir.  Please advise

## 2021-11-29 ENCOUNTER — OFFICE VISIT (OUTPATIENT)
Dept: INTERNAL MEDICINE CLINIC | Facility: CLINIC | Age: 86
End: 2021-11-29
Payer: MEDICARE

## 2021-11-29 VITALS
OXYGEN SATURATION: 98 % | DIASTOLIC BLOOD PRESSURE: 77 MMHG | HEART RATE: 60 BPM | BODY MASS INDEX: 25.01 KG/M2 | SYSTOLIC BLOOD PRESSURE: 132 MMHG | WEIGHT: 165 LBS | HEIGHT: 68 IN | RESPIRATION RATE: 14 BRPM

## 2021-11-29 DIAGNOSIS — Z23 NEED FOR IMMUNIZATION AGAINST INFLUENZA: ICD-10-CM

## 2021-11-29 DIAGNOSIS — E78.5 HYPERLIPIDEMIA, UNSPECIFIED HYPERLIPIDEMIA TYPE: ICD-10-CM

## 2021-11-29 DIAGNOSIS — E11.9 TYPE 2 DIABETES MELLITUS WITHOUT COMPLICATION, WITHOUT LONG-TERM CURRENT USE OF INSULIN (HCC): Primary | ICD-10-CM

## 2021-11-29 DIAGNOSIS — I10 HYPERTENSION, BENIGN: ICD-10-CM

## 2021-11-29 DIAGNOSIS — R97.20 ELEVATED PSA, BETWEEN 10 AND LESS THAN 20 NG/ML: ICD-10-CM

## 2021-11-29 PROCEDURE — 90662 IIV NO PRSV INCREASED AG IM: CPT | Performed by: INTERNAL MEDICINE

## 2021-11-29 PROCEDURE — 3078F DIAST BP <80 MM HG: CPT | Performed by: INTERNAL MEDICINE

## 2021-11-29 PROCEDURE — 99214 OFFICE O/P EST MOD 30 MIN: CPT | Performed by: INTERNAL MEDICINE

## 2021-11-29 PROCEDURE — 3008F BODY MASS INDEX DOCD: CPT | Performed by: INTERNAL MEDICINE

## 2021-11-29 PROCEDURE — 3075F SYST BP GE 130 - 139MM HG: CPT | Performed by: INTERNAL MEDICINE

## 2021-11-29 PROCEDURE — G0008 ADMIN INFLUENZA VIRUS VAC: HCPCS | Performed by: INTERNAL MEDICINE

## 2021-11-29 NOTE — PROGRESS NOTES
Onesimo Hutchinson is a 81year old male. Patient presents with:   Follow - Up: 4 mos f/u   Injection: Will like Flu Vaccine     HPI:   46-year-old gentleman with a medical history significant for diabetes, hypertension, dyslipidemia, BPH, elevated PSA [facility]    • Impaired glucose tolerance    • Lipid screening 04/24/2013   • Other and unspecified hyperlipidemia    • Rectal bleeding 5/2/2021   • Rotator cuff tear, left 1993   • Unspecified essential hypertension       Past Surgical History:   Pro Conjunctivae normal.   Cardiovascular:      Rate and Rhythm: Normal rate and regular rhythm. Heart sounds: Normal heart sounds. No murmur heard. Pulmonary:      Effort: Pulmonary effort is normal.      Breath sounds: Normal breath sounds.  No rhonchi will follow-up with ophthalmology evaluation. I will see him back in 3 months      The patient indicates understanding of these issues and agrees to the plan. No follow-ups on file.     This note was prepared using Dragon Medical voice recognition dictati

## 2021-12-09 ENCOUNTER — TELEPHONE (OUTPATIENT)
Dept: SURGERY | Facility: CLINIC | Age: 86
End: 2021-12-09

## 2021-12-09 NOTE — TELEPHONE ENCOUNTER
I called pt verified name/ reminded pt of boni with Dr. Shayy Salter on 21 arrive at 1:00pm for his 1:20pm prostate bx CF, 2nd floor YELLOW parking, pt gave me verbal permission to talk to his son Lauren Saucedo while I was talking to pt so I went over the in

## 2021-12-13 ENCOUNTER — TELEPHONE (OUTPATIENT)
Dept: SURGERY | Facility: CLINIC | Age: 86
End: 2021-12-13

## 2021-12-13 ENCOUNTER — PROCEDURE (OUTPATIENT)
Dept: SURGERY | Facility: CLINIC | Age: 86
End: 2021-12-13
Payer: MEDICARE

## 2021-12-13 VITALS
WEIGHT: 165 LBS | BODY MASS INDEX: 25.01 KG/M2 | HEART RATE: 87 BPM | HEIGHT: 68 IN | DIASTOLIC BLOOD PRESSURE: 70 MMHG | SYSTOLIC BLOOD PRESSURE: 110 MMHG | RESPIRATION RATE: 16 BRPM

## 2021-12-13 DIAGNOSIS — R97.20 ELEVATED PROSTATE SPECIFIC ANTIGEN (PSA): Primary | ICD-10-CM

## 2021-12-13 PROCEDURE — 76942 ECHO GUIDE FOR BIOPSY: CPT | Performed by: UROLOGY

## 2021-12-13 PROCEDURE — 3008F BODY MASS INDEX DOCD: CPT | Performed by: UROLOGY

## 2021-12-13 PROCEDURE — 55700 BIOPSY OF PROSTATE,NEEDLE/PUNCH: CPT | Performed by: UROLOGY

## 2021-12-13 PROCEDURE — 3074F SYST BP LT 130 MM HG: CPT | Performed by: UROLOGY

## 2021-12-13 PROCEDURE — 3078F DIAST BP <80 MM HG: CPT | Performed by: UROLOGY

## 2021-12-13 NOTE — PATIENT INSTRUCTIONS
Philly Renner M.D.    1. Finish your prescribed 3 day course of the 2 different antibiotics as directed. 2. No heavy lifting or strenuous physical activity for a few days.     3. No aspirin or NSAIDs for 24 hours

## 2021-12-13 NOTE — TELEPHONE ENCOUNTER
Spoke with pt via phone. Verified name and . Pt states that when he had a BM this morning he noted a small amount of blood in his stool. He also noted small amount of blood on the tissue after wiping.  He denies history of hemorroids or current constipat

## 2021-12-13 NOTE — TELEPHONE ENCOUNTER
Per pt has a procedure today and is supposed to do an enema at 9 this morning and states he is bleeding.  Please advise

## 2021-12-13 NOTE — PROGRESS NOTES
[address] Patient Status:  Specimen    3/20/1935 MRN [id number]   Location [facility] [place], [place] Attending Lisa Pavon MD   [facility] Day # 0 PCP MD Wood Hart i guidance, I injected  1% xylocaine solution in each of the following 4 locations: Junction of the prostate and seminal vesicle at the right base; junction of the prostate and seminal vesicle at the left base; right apex of the prostate and on the left apex

## 2021-12-15 ENCOUNTER — TELEPHONE (OUTPATIENT)
Dept: SURGERY | Facility: CLINIC | Age: 86
End: 2021-12-15

## 2021-12-15 DIAGNOSIS — C61 PROSTATE CANCER (HCC): Primary | ICD-10-CM

## 2021-12-15 NOTE — TELEPHONE ENCOUNTER
-S/w son, Bharat Padilla; pt identity verified with name & .  -I read message from [address] as stated below to pt's Son.  -We established OV with [facility] for 22 @ 11:10am; pt will be accompanied by one of his sons.  -Son agreed to schedule CT/ abdomen (21) & jodie

## 2021-12-15 NOTE — PROGRESS NOTES
Urology staff,  Please call patient or his son, and review with him the fact that prostate cancer was found in his prostate biopsy tissue and that he needs to schedule a CT OF THE ABDOMEN AND PELVIS [facility] and also a NUCLEAR MEDICINE BONE SCAN AND

## 2021-12-15 NOTE — TELEPHONE ENCOUNTER
Pt's son called. Pt received the biopsy results. Caller requesting a call back to discuss results.   Please call

## 2021-12-17 ENCOUNTER — TELEPHONE (OUTPATIENT)
Dept: SURGERY | Facility: CLINIC | Age: 86
End: 2021-12-17

## 2021-12-17 NOTE — TELEPHONE ENCOUNTER
-CT scan scheduled for 12/30/21 at 0830  -NM Bone Scan scheduled for 12/27/21 at [address] with PVK scheduled 1/6/22 at 1110.

## 2021-12-17 NOTE — TELEPHONE ENCOUNTER
----- Message from Olu Sanders MD sent at 12/15/2021  9:55 AM CST -----  Urology staff,  Please call patient or his son, and review with him the fact that prostate cancer was found in his prostate biopsy tissue and that he needs to schedule a CT OF T

## 2021-12-22 RX ORDER — LISINOPRIL AND HYDROCHLOROTHIAZIDE 20; 12.5 MG/1; MG/1
2 TABLET ORAL DAILY
Qty: 180 TABLET | Refills: 1 | Status: SHIPPED | OUTPATIENT
Start: 2021-12-22 | End: 2022-06-20

## 2021-12-22 NOTE — TELEPHONE ENCOUNTER
Refill passed per [facility] protocol.   Requested Prescriptions   Pending Prescriptions Disp Refills    LISINOPRIL-HYDROCHLOROTHIAZIDE 20-12.5 MG Oral Tab [Pharmacy Med Name: LISINOPRIL-HCTZ 20/12.5MG TABLETS] 180 tablet 1     Sig: TAKE 2 TABLETS BY MOUTH EVERY DAY        Hypertensive Medications Protocol Passed - 12/22/2021  2:25 PM        Passed - CMP or BMP in past 12 months        Passed - Appointment in past 6 or next 3 months        Passed - GFR  > 50     Lab Results   Component Value Date    GFRAA 60 09/20/2021                     Future Appointments         Provider Department Appt Notes    In 5 days [address] [facility] Nuclear Medicine Sched w/son- Order in [address]    In 5 days [address] [address]) Nuclear Medicine Sched w/son- Order in [facility]    In 1 week [facility] [address] Injections Sched w/son- Order in [facility]    In 2 weeks [address], [place] [address], [address] [place], [place], [place] Discussion: imaging results          Recent Outpatient Visits              3 weeks ago Type 2 diabetes mellitus without complication, without long-term current use of insulin [facility])    Layo Dunlap MD    Office Visit    2 months ago Gross hematuria    [facility] for [place], [place], [employer] APRN    Office Visit    3 months ago Hematuria, unspecified type    [facility], [place], [place], [place], Deidra Shore MD    Office Visit    4 months ago Hyperlipidemia, unspecified hyperlipidemia type    [facility], Nery Duggan MD    Office Visit    7 months ago Gastrointestinal hemorrhage associated with intestinal diverticulosis    [facility], Nery Duggan MD    Office Visit

## 2021-12-27 ENCOUNTER — HOSPITAL ENCOUNTER (OUTPATIENT)
Dept: NUCLEAR MEDICINE | Facility: HOSPITAL | Age: 86
Discharge: HOME | End: 2021-12-27
Attending: UROLOGY
Payer: MEDICARE

## 2021-12-27 DIAGNOSIS — C61 PROSTATE CANCER (HCC): ICD-10-CM

## 2021-12-27 PROCEDURE — 78306 BONE IMAGING WHOLE BODY: CPT | Performed by: UROLOGY

## 2021-12-29 ENCOUNTER — TELEPHONE (OUTPATIENT)
Dept: SURGERY | Facility: CLINIC | Age: 86
End: 2021-12-29

## 2021-12-29 NOTE — TELEPHONE ENCOUNTER
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

## 2021-12-31 ENCOUNTER — HOSPITAL ENCOUNTER (OUTPATIENT)
Dept: CT IMAGING | Facility: HOSPITAL | Age: 86
Discharge: HOME | End: 2021-12-31
Attending: UROLOGY
Payer: MEDICARE

## 2021-12-31 DIAGNOSIS — C61 PROSTATE CANCER (HCC): ICD-10-CM

## 2021-12-31 LAB — CREAT BLD-MCNC: 1.1 MG/DL

## 2021-12-31 PROCEDURE — 74177 CT ABD & PELVIS W/CONTRAST: CPT | Performed by: UROLOGY

## 2021-12-31 PROCEDURE — 82565 ASSAY OF CREATININE: CPT

## 2021-12-31 NOTE — TELEPHONE ENCOUNTER
Pt called back. Notified him of PVK result note. Verbalized understanding. Asked time for upcoming appt.  Pt to follow-up on 1/6/22 at 11:10am.

## 2022-01-06 ENCOUNTER — TELEPHONE (OUTPATIENT)
Dept: SURGERY | Facility: CLINIC | Age: 87
End: 2022-01-06

## 2022-01-06 ENCOUNTER — OFFICE VISIT (OUTPATIENT)
Dept: SURGERY | Facility: CLINIC | Age: 87
End: 2022-01-06
Payer: MEDICARE

## 2022-01-06 DIAGNOSIS — N28.1 RENAL CYST: ICD-10-CM

## 2022-01-06 DIAGNOSIS — N40.1 BENIGN PROSTATIC HYPERPLASIA WITH URINARY FREQUENCY: ICD-10-CM

## 2022-01-06 DIAGNOSIS — R35.1 NOCTURIA: ICD-10-CM

## 2022-01-06 DIAGNOSIS — C61 PROSTATE CANCER (HCC): Primary | ICD-10-CM

## 2022-01-06 DIAGNOSIS — R35.0 BENIGN PROSTATIC HYPERPLASIA WITH URINARY FREQUENCY: ICD-10-CM

## 2022-01-06 DIAGNOSIS — R31.0 GROSS HEMATURIA: ICD-10-CM

## 2022-01-06 DIAGNOSIS — Z79.82 LONG TERM (CURRENT) USE OF ASPIRIN: ICD-10-CM

## 2022-01-06 PROCEDURE — 99215 OFFICE O/P EST HI 40 MIN: CPT | Performed by: UROLOGY

## 2022-01-06 NOTE — PATIENT INSTRUCTIONS
Darline Cockayne, M.D.    1.  Continue tamsulosin 0.4 mg daily    2.   Please make consult appointment to see radiation Dr. Octavia Fritz   At [address] [facility] [phone]  I am sending Dr. Leyda Cisneros

## 2022-01-06 NOTE — TELEPHONE ENCOUNTER
Patient son Ron Mora requesting call back. States they have a few more questions in regards to diagnosis of prostate cancer and what stage its at.  Please advise

## 2022-01-06 NOTE — TELEPHONE ENCOUNTER
Dr. Alexandria Sandoval,    I am seeing Isela Howell now and his son Jt Benites. There is 86; ambulates freely. PSA 15.7. Office prostate biopsy revealed high risk [place] 4+5 adenocarcinoma the prostate. CT scan no lymphadenopathy. Bone scan negative for metastases. I explained to both of them treatment options of leuprolide injections alone versus leuprolide plus definitive IMRT. Would you please agreed to evaluate him for an opinion; if you are not able to see him, could Dr. Gomez see him ? Cannot start leuprolide injection today because need prior approval with his health plan.     Many thanks,    Nancy Duke, urology    Copy of message to Dr. Millie Whitten

## 2022-01-06 NOTE — PROGRESS NOTES
HPI:    Patient ID: Sherren Dolphin is a 81year old male. HPI    History provided by patient and son, Sabine Cat      Prostate Cancer  Diagnosed on 12/13/2021  prostate biopsy grade group 5 ; adenocarcinoma prostate [place] score 4+5=9; very high risk .  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 • ciprofloxacin 500 MG Oral Tab Take 1 tablet (500 mg total) by mouth 2 (two) times daily.  (Patient not taking: Reported on 1/6/2022) 6 tablet 0     Allergies:No urination?: Less than half the time  Over the past month, how many times per night did you most typically get up to urinate from the time you went to bed at night until the time you got up in the morning?: Half the time  Total Symptom Score: 17         PHY lesions are identified; Postoperative changes of lumbosacral spinal fusion at L4-S1;  Hepatic and renal cystic lesions appear unchanged    12/27/2021  NM BONE SCAN WB = no evidence for MDP avid osseous metastatic disease    10/06/2021 CT Urogram (w+wo) = bi infection, blood clot complications, deep vein thrombosis, pulmonary embolism, possible fatal complications, possibility of a heart attack or stroke), complications, side effects, reasons for, nature of, alternatives to the open or robotic radical prostate Brachytherapy  and chooses to consult  Dr. Shabnam Álvarez,  Radiation oncologist, about EBR (IMRT) with  ADT injections. Patient needs insurance approval before starting ADT injections.  During the visit, I send an electronic message to Dr. Shabnam Álvarez explaining surgical procedure.          I explained to patient the risks, side effects, and alternatives, and I answered questions concerning them; patient understands all of this and decides to proceed with the following:       Treatment Plan & Patient Instructions

## 2022-01-06 NOTE — TELEPHONE ENCOUNTER
Kailee Membreno consult appointment with Isela Howell, best to call his son Jt Benites phone #  [phone]  Patient does have slight/ mild dementia -- may affect decision making.     Nancy Duke

## 2022-01-07 ENCOUNTER — NURSE ONLY (OUTPATIENT)
Dept: SURGERY | Facility: CLINIC | Age: 87
End: 2022-01-07
Payer: MEDICARE

## 2022-01-07 ENCOUNTER — TELEPHONE (OUTPATIENT)
Dept: SURGERY | Facility: CLINIC | Age: 87
End: 2022-01-07

## 2022-01-07 VITALS — HEART RATE: 81 BPM | DIASTOLIC BLOOD PRESSURE: 72 MMHG | RESPIRATION RATE: 20 BRPM | SYSTOLIC BLOOD PRESSURE: 151 MMHG

## 2022-01-07 DIAGNOSIS — C61 PROSTATE CANCER (HCC): Primary | ICD-10-CM

## 2022-01-07 PROCEDURE — 96402 CHEMO HORMON ANTINEOPL SQ/IM: CPT | Performed by: UROLOGY

## 2022-01-07 NOTE — PROGRESS NOTES
Pt was here yesterday for O/V and we could not administer his Eligard 45 mg inj. as we needed to get a PA for it first. I did this and pt returned today for this injection as a N/V.  I called the pt into the exam room and introduced myself and verified his

## 2022-01-07 NOTE — TELEPHONE ENCOUNTER
Per pt returning a call, states his son received a message requesting he come in today for an injection.  Please advise

## 2022-01-07 NOTE — TELEPHONE ENCOUNTER
S/W pt's son Annemarie Smith and informed him that the old system of staging is no longer used and that the T1C means that the pt had no prostate nodules only an elevated PSA. The cancer however is high risk though. Son was thankful for this info.  He asked that I call pt back to

## 2022-01-07 NOTE — TELEPHONE ENCOUNTER
I called Georgina Perdue at [phone] to request PA for Eligard 45 mg and was given a case # [id number] and then transferred to a nurse  to give clinical info.  I s/w Aye after a 13 min hold time and gave her all clinical info and she gave an Jolene Wliliamson

## 2022-01-11 ENCOUNTER — OFFICE VISIT (OUTPATIENT)
Dept: RADIATION ONCOLOGY | Facility: HOSPITAL | Age: 87
End: 2022-01-11
Attending: RADIOLOGY
Payer: MEDICARE

## 2022-01-11 VITALS
HEART RATE: 93 BPM | WEIGHT: 168.81 LBS | RESPIRATION RATE: 16 BRPM | DIASTOLIC BLOOD PRESSURE: 74 MMHG | TEMPERATURE: 98 F | OXYGEN SATURATION: 100 % | BODY MASS INDEX: 26 KG/M2 | SYSTOLIC BLOOD PRESSURE: 140 MMHG

## 2022-01-11 DIAGNOSIS — C61 PROSTATE CANCER (HCC): Primary | ICD-10-CM

## 2022-01-11 PROCEDURE — 99212 OFFICE O/P EST SF 10 MIN: CPT

## 2022-01-11 NOTE — PROGRESS NOTES
Nursing Consultation Note  Patient: Regis Perdomo  YOB: 1935  Age: 81year old  Radiation Oncologist: Dr. Erminia Habermann  Referring Physician: Danny [email or web address]  Consult Date: 1/11/2022      Chemotherapy: No  Labs: Re MOUTH DAILY 90 tablet 3   • tamsulosin (FLOMAX) cap Take 1 capsule (0.4 mg total) by mouth daily.  90 capsule 1   • Fluticasone Propionate 50 MCG/ACT Nasal Suspension INHALE 1 SPRAY BY NASAL ROUTE DAILY 1 Bottle 0   • GABAPENTIN 300 MG Oral Cap TAKE 2 CAPSU  Service: Not Asked        Blood Transfusions: Not Asked        Caffeine Concern: Yes        Occupational Exposure: Not Asked        Hobby Hazards: Not Asked        Sleep Concern: Not Asked        Stress Concern: Not Asked        Weight Concer

## 2022-01-11 NOTE — PROGRESS NOTES
Nursing Consultation Note  Patient: Anisha Mayfield  YOB: 1935  Age: 81year old  Radiation Oncologist: Dr. Garcia Garcia  Referring Physician: [email or web address]  Consult Date: 1/11/2022      Chemotherapy: No  Labs: Re DAILY 90 tablet 3   • tamsulosin (FLOMAX) cap Take 1 capsule (0.4 mg total) by mouth daily.  90 capsule 1   • Fluticasone Propionate 50 MCG/ACT Nasal Suspension INHALE 1 SPRAY BY NASAL ROUTE DAILY 1 Bottle 0   • GABAPENTIN 300 MG Oral Cap TAKE 2 CAPSULES BY  Service: Not Asked        Blood Transfusions: Not Asked        Caffeine Concern: Yes        Occupational Exposure: Not Asked        Hobby Hazards: Not Asked        Sleep Concern: Not Asked        Stress Concern: Not Asked        Weight Concern: No level  Instruct on treatment planning  Instruct on radiation therapy appointment scheduling  Instruct on purpose of radiation therapy  Instruct on side effects of radiation therapy    Expected Outcomes:  Knowledge of radiation therapy  Knowledge of side ef

## 2022-01-11 NOTE — PATIENT INSTRUCTIONS
You will get a call to schedule CT sim, the planning scan. Call [phone] for radiation questions or concerns.

## 2022-01-17 NOTE — CONSULTS
[facility]    PATIENT'S NAME: [facility]   RADIATION ONCOLOGIST: Jose Maunel Schuster.  Sam Suero MD   PATIENT ACCOUNT #: [de-identified] LOCATION: [address] [place] [address] RECORD #: [id number] YOB: 1935   CONSULTATION DATE: 01/11/2022       RA functioning and has had no weight loss or changes in appetite. PAST MEDICAL HISTORY:  The patient has a past history of hypertension, diabetes, hyperlipidemia, and a left rotator cuff repair.       PAST SURGICAL HISTORY:  Laminectomy, appendectomy, tonsi 29-year-old male with a history of a high-risk prostate cancer. He has grade group 5 disease and a PSA just over 15. He does not have any evidence of metastatic disease by CT scan or bone scan. He has received his first androgen deprivation injection. incomplete emptying, nocturia, and hesitancy. There also can be some loose stools or diarrhea. Fatigue is common place as well.   All these side effects will worsen during the course of therapy but should resolve fairly quickly after radiation is complete

## 2022-01-18 ENCOUNTER — APPOINTMENT (OUTPATIENT)
Dept: RADIATION ONCOLOGY | Facility: HOSPITAL | Age: 87
End: 2022-01-18
Attending: RADIOLOGY
Payer: MEDICARE

## 2022-01-18 PROCEDURE — 77334 RADIATION TREATMENT AID(S): CPT | Performed by: RADIOLOGY

## 2022-01-20 ENCOUNTER — TELEPHONE (OUTPATIENT)
Dept: SURGERY | Facility: CLINIC | Age: 87
End: 2022-01-20

## 2022-01-20 NOTE — TELEPHONE ENCOUNTER
Called pt back, verified name and . He got his eliguard shot on on  in office (R upper abdomen) as ordered by PVK. He said initially all he felt a lump at the injection site.  I explained to him this is normal.     Monday he said he went to the

## 2022-01-20 NOTE — TELEPHONE ENCOUNTER
Patient indicates he is having redness with watery pus on injection site. Patient applied ice and cream, but still red. Please call at [phone][facility]

## 2022-01-21 ENCOUNTER — TELEPHONE (OUTPATIENT)
Dept: SURGERY | Facility: CLINIC | Age: 87
End: 2022-01-21

## 2022-01-21 ENCOUNTER — OFFICE VISIT (OUTPATIENT)
Dept: SURGERY | Facility: CLINIC | Age: 87
End: 2022-01-21
Payer: MEDICARE

## 2022-01-21 DIAGNOSIS — C61 PROSTATE CANCER (HCC): ICD-10-CM

## 2022-01-21 DIAGNOSIS — T80.90XA COMPLICATION OF INJECTION, INITIAL ENCOUNTER: Primary | ICD-10-CM

## 2022-01-21 PROCEDURE — 99213 OFFICE O/P EST LOW 20 MIN: CPT | Performed by: UROLOGY

## 2022-01-21 NOTE — TELEPHONE ENCOUNTER
Dear Perla Giraldo,    On 1/7/2022 my office nurses, under my direction, administered Eligard 6-month 45 mg injection to the patient as part of treatment protocol for high-risk prostate cancer [place] 4+5 in preparation for external beam radiation therapy. Patient has developed an allergic reaction to the medication. As result, I am reluctant to continue [facility] injection therapy. As result, I am asking patient to seek opinion from Dr. Rochelle Hager as to whether other types of ADT androgen deprivation therapy could be used in this case and whether medication such as Relugolix or other alternative could be considered.     Await recommendations from Dr. Conde Albino    Many thanks,    Hanh Castro,  [facility] )   urology

## 2022-01-21 NOTE — PROGRESS NOTES
HPI:    Patient ID: Suyapa Merida is a 81year old male.     HPI      Prostate Cancer  Diagnosed on 12/13/2021  prostate biopsy grade group 5 ; adenocarcinoma prostate [place] score 4+5=9; very high risk . Most [id number]/01/2021 PSA = 15.70. denies ass Cardiovascular: Negative for chest pain. Gastrointestinal: Negative for abdominal pain and constipation. Genitourinary: Negative for dysuria, flank pain and hematuria. Skin: Negative for color change. Neurological: Negative for speech difficulty. MD;  Location: [address] [place] [address] ENDOSCOPY   • LAMINECTOMY   1987   • LUMBAR SPINE FUSION COMBINED   8-8-13   • REPAIR ROTATOR CUFF,ACUTE Left 1993   • TONSILLECTOMY                   Family History   Problem Relation Age of Onset   • Heart Disease Father 79         CAD recent 11/01/2021 PSA = 15.70. I explain to patient that his prostate cancer is very high risk.  Patient presently denies any associated symptoms to suggest advanced prostate cancer.  For best treatment for patient's high risk prostate cancer, in light of h developed an allergic reaction to the Eligard injection that you received on 1/7/2021.   Since it is not causing you any pain, nor any severe itching, no signs of any bacterial infection, and since she reported that it is slowly improving, I recommend obser

## 2022-01-21 NOTE — H&P
HPI:    Patient ID: Jeanie Rodriguez is a 81year old male.     HPI      Prostate Cancer  Diagnosed on 12/13/2021  prostate biopsy grade group 5 ; adenocarcinoma prostate [place] score 4+5=9; very high risk . Most [id number]/01/2021 PSA = 15.70. denies ass Cardiovascular: Negative for chest pain. Gastrointestinal: Negative for abdominal pain and constipation. Genitourinary: Negative for dysuria, flank pain and hematuria. Skin: Negative for color change. Neurological: Negative for speech difficulty. Anne Motta MD;  Location: [facility] ENDOSCOPY   • LAMINECTOMY   1987   • LUMBAR SPINE FUSION COMBINED   8-8-13   • REPAIR ROTATOR CUFF,ACUTE Left 1993   • TONSILLECTOMY                   Family History   Problem Relation Age of Onset   • Heart Disease Father 79     Eligard  6 month 45 mg injection. Patient present to day with an allergic reaction from Leuprolide  Injection.  On PE today,  Long oval  8 x 3.5 cm mildly erythematous with two blister at either end - 2 cm and 3 cm, clear fluid -- does not appear to be infe under the direction and in the presence of Jah Argueta MD.   Electronically Signed: Fiorella Bansal, 1/21/2022, 11:25 AM.

## 2022-01-21 NOTE — TELEPHONE ENCOUNTER
Nurses,    Please have patient come in for a nurse visit for nurses to look at it and see if there is any evidence of allergic reaction to it or else any signs of any infection at the injection site; if there were to be an infection, I would not be in favo

## 2022-01-21 NOTE — TELEPHONE ENCOUNTER
Called pt, he will come at 11am for NV to check site as advised by JOSEPHK in prior msg.    appt made

## 2022-01-25 PROCEDURE — 77300 RADIATION THERAPY DOSE PLAN: CPT | Performed by: RADIOLOGY

## 2022-01-25 PROCEDURE — 77301 RADIOTHERAPY DOSE PLAN IMRT: CPT | Performed by: RADIOLOGY

## 2022-01-25 PROCEDURE — 77338 DESIGN MLC DEVICE FOR IMRT: CPT | Performed by: RADIOLOGY

## 2022-01-26 ENCOUNTER — OFFICE VISIT (OUTPATIENT)
Dept: INTERNAL MEDICINE CLINIC | Facility: CLINIC | Age: 87
End: 2022-01-26
Payer: MEDICARE

## 2022-01-26 VITALS
SYSTOLIC BLOOD PRESSURE: 128 MMHG | HEART RATE: 70 BPM | DIASTOLIC BLOOD PRESSURE: 80 MMHG | BODY MASS INDEX: 25.46 KG/M2 | OXYGEN SATURATION: 96 % | RESPIRATION RATE: 14 BRPM | HEIGHT: 68 IN | WEIGHT: 168 LBS

## 2022-01-26 DIAGNOSIS — R80.9 TYPE 2 DIABETES MELLITUS WITH MICROALBUMINURIA, WITHOUT LONG-TERM CURRENT USE OF INSULIN (HCC): ICD-10-CM

## 2022-01-26 DIAGNOSIS — I10 HYPERTENSION, BENIGN: ICD-10-CM

## 2022-01-26 DIAGNOSIS — E11.9 TYPE 2 DIABETES MELLITUS WITHOUT COMPLICATION, WITHOUT LONG-TERM CURRENT USE OF INSULIN (HCC): Primary | ICD-10-CM

## 2022-01-26 DIAGNOSIS — E11.29 TYPE 2 DIABETES MELLITUS WITH MICROALBUMINURIA, WITHOUT LONG-TERM CURRENT USE OF INSULIN (HCC): ICD-10-CM

## 2022-01-26 DIAGNOSIS — C61 PROSTATE CANCER (HCC): ICD-10-CM

## 2022-01-26 PROCEDURE — 99214 OFFICE O/P EST MOD 30 MIN: CPT | Performed by: INTERNAL MEDICINE

## 2022-01-26 PROCEDURE — 3008F BODY MASS INDEX DOCD: CPT | Performed by: INTERNAL MEDICINE

## 2022-01-26 PROCEDURE — 3074F SYST BP LT 130 MM HG: CPT | Performed by: INTERNAL MEDICINE

## 2022-01-26 PROCEDURE — 3079F DIAST BP 80-89 MM HG: CPT | Performed by: INTERNAL MEDICINE

## 2022-01-26 NOTE — PROGRESS NOTES
Patient saw Kayla Ortiz at [facility] in [facility] ([facility])  For his Dilated Eye Exam on 1/2022. Will call to obtain report

## 2022-01-27 NOTE — PROGRESS NOTES
Shirlene Husain is a 81year old male. Patient presents with: Follow - Up: PSA Biopsy   Rash Skin Problem: Right side ABD Rash     HPI:   46-year-old gentleman here for follow-up.  He recently got diagnosed with prostate cancer and he developed allergic re 8-8-13   • REPAIR ROTATOR CUFF,ACUTE Left 1993   • TONSILLECTOMY        Social History:  Social History    Tobacco Use      Smoking status: Former Smoker        Types: Cigarettes      Smokeless tobacco: Never Used    Vaping Use      Vaping Use: Never used back: Neck supple. Right lower leg: No edema. Left lower leg: No edema. Skin:     General: Skin is warm and dry. Neurological:      General: No focal deficit present. Mental Status: He is alert and oriented to person, place, and time.  Me

## 2022-01-31 RX ORDER — METFORMIN HYDROCHLORIDE 500 MG/1
500 TABLET, EXTENDED RELEASE ORAL DAILY
Qty: 90 TABLET | Refills: 1 | Status: SHIPPED | OUTPATIENT
Start: 2022-01-31

## 2022-01-31 NOTE — TELEPHONE ENCOUNTER
Please review; protocol failed/ no protocol.     Requested Prescriptions   Pending Prescriptions Disp Refills    METFORMIN HCL  MG Oral Tablet 24 Hr [Pharmacy Med Name: METFORMIN ER 500MG 24HR TABS] 90 tablet 0     Sig: TAKE 1 TABLET(500 MG) BY MOUTH Medicare Advantage Supervisit

## 2022-02-01 ENCOUNTER — APPOINTMENT (OUTPATIENT)
Dept: RADIATION ONCOLOGY | Facility: HOSPITAL | Age: 87
End: 2022-02-01
Attending: RADIOLOGY
Payer: MEDICARE

## 2022-02-08 ENCOUNTER — TELEPHONE (OUTPATIENT)
Dept: HEMATOLOGY/ONCOLOGY | Facility: HOSPITAL | Age: 87
End: 2022-02-08

## 2022-02-08 ENCOUNTER — OFFICE VISIT (OUTPATIENT)
Dept: HEMATOLOGY/ONCOLOGY | Facility: HOSPITAL | Age: 87
End: 2022-02-08
Attending: INTERNAL MEDICINE
Payer: MEDICARE

## 2022-02-08 VITALS
OXYGEN SATURATION: 95 % | TEMPERATURE: 99 F | RESPIRATION RATE: 16 BRPM | BODY MASS INDEX: 24.86 KG/M2 | WEIGHT: 164 LBS | HEIGHT: 68 IN | DIASTOLIC BLOOD PRESSURE: 72 MMHG | SYSTOLIC BLOOD PRESSURE: 143 MMHG | HEART RATE: 80 BPM

## 2022-02-08 DIAGNOSIS — C61 PROSTATE CANCER (HCC): Primary | ICD-10-CM

## 2022-02-08 PROCEDURE — 99205 OFFICE O/P NEW HI 60 MIN: CPT | Performed by: INTERNAL MEDICINE

## 2022-02-08 NOTE — TELEPHONE ENCOUNTER
LVM informing patient of scheduled follow up appointment with Dr. Siria Quintero on Tuesday at 9:30am. Provided patient with call back number if he had any questions.

## 2022-02-09 ENCOUNTER — TELEPHONE (OUTPATIENT)
Dept: SURGERY | Facility: CLINIC | Age: 87
End: 2022-02-09

## 2022-02-10 NOTE — TELEPHONE ENCOUNTER
Understood, however, the different companies likely use different carrier substance for the leuprolide indication so it is possible that patient might be allergic to one form and not allergic to the other form.     Dr. Lenny Cruz

## 2022-02-10 NOTE — TELEPHONE ENCOUNTER
Urology nurses,    This patient will need to be on long-term androgen deprivation therapy (hormone therapy. He had an allergic reaction to Eligard. He will be due for a ADT injection on or after July 7, 2022. Could you please find out if we could administer a Lupron 3-month 22.5 mg injection that day instead of Eligard because of the allergic reaction to Eligard . .. A 3-month injection rather than a 6-month to make sure he tolerates the 3-month injection; once again he had the allergic reaction to Eligard 6 months. Please let me know. Thank you,    Dr. Eusebia Enamorado    PS, patient is some medical oncologist Dr. Dawson Naidu on 2/8/2022 and Dr. Reina Chang recommended the following =    \"the standard of care would be EBRT+(ADT+abiraterone+predniosne 5 mg/daily for 2 yrs) is definitive therapy for his prostate cancer. ..  \"      Dr. Joon Choe

## 2022-02-10 NOTE — TELEPHONE ENCOUNTER
I called [facility] pharmacy and left voicemail with medication  GILBERTO, asking if she could eventually order either Trelstar (triptorelin) or Zoladex (as goserelin), probably 3-month injection rather than 6 months (to make sure patient does not have another allergic reaction) for this patient on or after July 7, 2022 and asked that she call me back.      Dr. Nava Weiner

## 2022-02-10 NOTE — TELEPHONE ENCOUNTER
Urology nurse Bryanna Calloway,    I just spoke to Arturo Collins from purchasing office [facility] pharmacy; she stated that they could obtain either Trelstar (triptorelin) 11.25 mg three month injection (several hundred dollars cheaper) or Zoladex (goserelin) 10.8 mg three month injection (several 100 dollars more expensive); they would need a few days advanced warning to order it. Ирина stated that they have no way of independently confirming if one medication or the other have had any problems with their syringe injection system. Please find out from Arturo Collins how the patient would be charged for the medication    Also please make arrangements for patient to see me in the office likely the last week in June so that I can discuss with patient option of not administering a leuprolide injection, since he had an allergic reaction to Eligard, but rather going with one of the 2 above medications and then we could order it through Arturo Collins at that time.     Thank you,    Dr. Sanchez Pond

## 2022-02-11 NOTE — TELEPHONE ENCOUNTER
Noted and discussed with PVK will try to order Trelstar again and if unable will have it loaded in [place] again.

## 2022-02-12 ENCOUNTER — HOSPITAL ENCOUNTER (OUTPATIENT)
Dept: CT IMAGING | Facility: HOSPITAL | Age: 87
Discharge: HOME | End: 2022-02-12
Attending: INTERNAL MEDICINE
Payer: MEDICARE

## 2022-02-12 ENCOUNTER — LAB ENCOUNTER (OUTPATIENT)
Dept: LAB | Facility: HOSPITAL | Age: 87
End: 2022-02-12
Attending: INTERNAL MEDICINE
Payer: MEDICARE

## 2022-02-12 DIAGNOSIS — E11.9 TYPE 2 DIABETES MELLITUS WITHOUT COMPLICATION, WITHOUT LONG-TERM CURRENT USE OF INSULIN (HCC): ICD-10-CM

## 2022-02-12 DIAGNOSIS — I10 HYPERTENSION, BENIGN: ICD-10-CM

## 2022-02-12 DIAGNOSIS — C61 PROSTATE CANCER (HCC): ICD-10-CM

## 2022-02-12 LAB
CHOLEST SERPL-MCNC: 134 MG/DL (ref ?–200)
CREAT BLD-MCNC: 1.1 MG/DL
CREAT UR-SCNC: 102 MG/DL
EST. AVERAGE GLUCOSE BLD GHB EST-MCNC: 128 MG/DL (ref 68–126)
FASTING PATIENT LIPID ANSWER: YES
HBA1C MFR BLD: 6.1 % (ref ?–5.7)
HDLC SERPL-MCNC: 48 MG/DL (ref 40–59)
LDLC SERPL CALC-MCNC: 74 MG/DL (ref ?–100)
MICROALBUMIN UR-MCNC: 56.5 MG/DL
MICROALBUMIN/CREAT 24H UR-RTO: 553.9 UG/MG (ref ?–30)
NONHDLC SERPL-MCNC: 86 MG/DL (ref ?–130)
PSA SERPL-MCNC: 3.07 NG/ML (ref ?–4)
TRIGL SERPL-MCNC: 54 MG/DL (ref 30–149)
TSI SER-ACNC: 2.4 MIU/ML (ref 0.36–3.74)
VLDLC SERPL CALC-MCNC: 8 MG/DL (ref 0–30)

## 2022-02-12 PROCEDURE — 82570 ASSAY OF URINE CREATININE: CPT

## 2022-02-12 PROCEDURE — 36415 COLL VENOUS BLD VENIPUNCTURE: CPT

## 2022-02-12 PROCEDURE — 84153 ASSAY OF PSA TOTAL: CPT

## 2022-02-12 PROCEDURE — 82043 UR ALBUMIN QUANTITATIVE: CPT

## 2022-02-12 PROCEDURE — 71260 CT THORAX DX C+: CPT | Performed by: INTERNAL MEDICINE

## 2022-02-12 PROCEDURE — 83036 HEMOGLOBIN GLYCOSYLATED A1C: CPT

## 2022-02-12 PROCEDURE — 82565 ASSAY OF CREATININE: CPT

## 2022-02-12 PROCEDURE — 80061 LIPID PANEL: CPT

## 2022-02-12 PROCEDURE — 84443 ASSAY THYROID STIM HORMONE: CPT

## 2022-02-14 NOTE — PROGRESS NOTES
[facility] Radiation Treatment Management Note 1-5    Patient:  Lisbet Garcia  Age:  81year old  Visit Diagnosis:  No diagnosis found. Primary Rad/Onc:  Dr. Dennis Good    Site Delivered Dose (cGy) Prescribed Dose (cGy) Fraction #   Prostate 250 7000 1/28           First treatment date:  2/15/2022  Concurrent chemotherapy:  None    Oncology Vitals 1/21/2022 1/26/2022 2/8/2022   Height - 5' 8\" 5' 8\"   Height - 173 cm 173 cm   Weight - 168 lb 164 lb   Weight - 76.204 kg 74.39 kg   BSA (m2) - 1.9 m2 1.88 m2   /83 128/80 143/72   Pulse 108 70 80   Resp - 14 16   Temp - - 98.8   SpO2 - 96 95   Pain Score - - 0   Some recent data might be hidden        Toxicities:  Fatigue Grade 1= Fatigue relieved by rest  Constipation Grade 0= None, takes milk of mag PRN  Diarrhea  Grade 0= None  Dysuria on urination Grade 0= None  Urgency on urination Grade 0= None  Frequency on urinationGrade 0= None  Urine stream strength Strong  Urine Incontinence Grade 0= None  Nocturia Grade 1= Present ~3x/noc      Nursing Note:  Pt seen for OTV with Dr. Macho Arevalo. Started RT today. Wt Readings from Last 6 Encounters:  02/15/22 : 74.8 kg (165 lb)  02/08/22 : 74.4 kg (164 lb)  01/26/22 : 76.2 kg (168 lb)  01/11/22 : 76.6 kg (168 lb 12.8 oz)  12/13/21 : 74.8 kg (165 lb)  11/29/21 : 74.8 kg (165 lb)        Evelyn Corea, AMILCAR    Physician Note:  Subjective:    New start, no issues       Objective:  NAD      Treatment setup imaging have been reviewed:   Yes    Assessment/Plan:    High risk pca, whole pelvis+ bst    Reviewed potential side effects      Continue radiotherapy per plan    Next visit:  1 week    Dr. Lenin Contreras for Dennis Good MD

## 2022-02-15 ENCOUNTER — OFFICE VISIT (OUTPATIENT)
Dept: RADIATION ONCOLOGY | Facility: HOSPITAL | Age: 87
End: 2022-02-15
Attending: RADIOLOGY
Payer: MEDICARE

## 2022-02-15 ENCOUNTER — OFFICE VISIT (OUTPATIENT)
Dept: HEMATOLOGY/ONCOLOGY | Facility: HOSPITAL | Age: 87
End: 2022-02-15
Attending: INTERNAL MEDICINE
Payer: MEDICARE

## 2022-02-15 VITALS
TEMPERATURE: 98 F | DIASTOLIC BLOOD PRESSURE: 68 MMHG | HEIGHT: 68 IN | HEART RATE: 74 BPM | BODY MASS INDEX: 25.01 KG/M2 | SYSTOLIC BLOOD PRESSURE: 157 MMHG | WEIGHT: 165 LBS | RESPIRATION RATE: 16 BRPM | OXYGEN SATURATION: 100 %

## 2022-02-15 VITALS
OXYGEN SATURATION: 100 % | BODY MASS INDEX: 25 KG/M2 | WEIGHT: 165 LBS | RESPIRATION RATE: 16 BRPM | DIASTOLIC BLOOD PRESSURE: 68 MMHG | HEART RATE: 74 BPM | TEMPERATURE: 98 F | SYSTOLIC BLOOD PRESSURE: 157 MMHG

## 2022-02-15 DIAGNOSIS — C61 PROSTATE CANCER (HCC): Primary | ICD-10-CM

## 2022-02-15 PROCEDURE — 99214 OFFICE O/P EST MOD 30 MIN: CPT | Performed by: INTERNAL MEDICINE

## 2022-02-15 PROCEDURE — 77385 HC IMRT SIMPLE: CPT | Performed by: RADIOLOGY

## 2022-02-15 RX ORDER — PREDNISONE 1 MG/1
5 TABLET ORAL DAILY
Qty: 30 TABLET | Refills: 11 | Status: SHIPPED | OUTPATIENT
Start: 2022-02-15 | End: 2022-03-29

## 2022-02-15 RX ORDER — ABIRATERONE ACETATE 250 MG/1
1000 TABLET ORAL DAILY
Qty: 120 TABLET | Refills: 11 | Status: SHIPPED | OUTPATIENT
Start: 2022-02-15 | End: 2022-03-29

## 2022-02-15 RX ORDER — PREDNISONE 1 MG/1
5 TABLET ORAL DAILY
Qty: 30 TABLET | Refills: 11 | Status: SHIPPED | OUTPATIENT
Start: 2022-02-15 | End: 2022-02-15

## 2022-02-16 PROCEDURE — 77385 HC IMRT SIMPLE: CPT | Performed by: RADIOLOGY

## 2022-02-17 ENCOUNTER — DIETICIAN VISIT (OUTPATIENT)
Dept: NUTRITION | Facility: HOSPITAL | Age: 87
End: 2022-02-17

## 2022-02-17 VITALS — WEIGHT: 168.38 LBS | BODY MASS INDEX: 26 KG/M2

## 2022-02-17 PROCEDURE — 77385 HC IMRT SIMPLE: CPT | Performed by: RADIOLOGY

## 2022-02-18 ENCOUNTER — APPOINTMENT (OUTPATIENT)
Dept: HEMATOLOGY/ONCOLOGY | Facility: HOSPITAL | Age: 87
End: 2022-02-18
Attending: PHYSICIAN ASSISTANT
Payer: MEDICARE

## 2022-02-18 ENCOUNTER — NURSE ONLY (OUTPATIENT)
Dept: HEMATOLOGY/ONCOLOGY | Facility: HOSPITAL | Age: 87
End: 2022-02-18
Attending: INTERNAL MEDICINE
Payer: MEDICARE

## 2022-02-18 DIAGNOSIS — C61 PROSTATE CANCER (HCC): ICD-10-CM

## 2022-02-18 PROCEDURE — 36415 COLL VENOUS BLD VENIPUNCTURE: CPT

## 2022-02-18 PROCEDURE — 84402 ASSAY OF FREE TESTOSTERONE: CPT

## 2022-02-18 PROCEDURE — 77385 HC IMRT SIMPLE: CPT | Performed by: RADIOLOGY

## 2022-02-18 PROCEDURE — 84403 ASSAY OF TOTAL TESTOSTERONE: CPT

## 2022-02-18 PROCEDURE — 77336 RADIATION PHYSICS CONSULT: CPT | Performed by: RADIOLOGY

## 2022-02-18 NOTE — TELEPHONE ENCOUNTER
Verified with KHRYAN that he wanted to give pt Trelstar 22.5 mg and I ordered 1 does from the pharmacy and it came and is in the med cabinet. Pt had an allergic reaction to Eligard.

## 2022-02-21 PROCEDURE — 77385 HC IMRT SIMPLE: CPT | Performed by: RADIOLOGY

## 2022-02-22 ENCOUNTER — OFFICE VISIT (OUTPATIENT)
Dept: RADIATION ONCOLOGY | Facility: HOSPITAL | Age: 87
End: 2022-02-22
Attending: RADIOLOGY
Payer: MEDICARE

## 2022-02-22 VITALS
TEMPERATURE: 98 F | SYSTOLIC BLOOD PRESSURE: 155 MMHG | DIASTOLIC BLOOD PRESSURE: 80 MMHG | BODY MASS INDEX: 25 KG/M2 | HEART RATE: 83 BPM | WEIGHT: 165.63 LBS | OXYGEN SATURATION: 99 % | RESPIRATION RATE: 16 BRPM

## 2022-02-22 DIAGNOSIS — C61 PROSTATE CANCER (HCC): Primary | ICD-10-CM

## 2022-02-22 PROCEDURE — 77385 HC IMRT SIMPLE: CPT | Performed by: RADIOLOGY

## 2022-02-22 NOTE — PROGRESS NOTES
[facility] Radiation Treatment Management Note 6-10    Patient:  Ac Garcia  Age:  81year old  Visit Diagnosis:    1. Prostate cancer [facility])      Primary Rad/Onc:  Dr. Kelly Herzog    Site Delivered Dose (cGy) Prescribed Dose (cGy) Fraction #   Prostate 1500 7000 6/28           First treatment date:  2/15/2022  Concurrent chemotherapy:  None    Oncology Vitals 2/15/2022 2/15/2022 2/17/2022   Height - 5' 8\" -   Height - 173 cm -   Weight 165 lb 165 lb 168 lb 6.4 oz   Weight 74.844 kg 74.844 kg 76.386 kg   BSA (m2) 1.88 m2 1.88 m2 1.9 m2   /68 157/68 -   Pulse 74 74 -   Resp 16 16 -   Temp 97.5 97.5 -   SpO2 100 100 -   Pain Score 0 0 -   Some recent data might be hidden        Toxicities:  Fatigue Grade 1= Fatigue relieved by rest  Constipation Grade 0= None, improving  Diarrhea  Grade 0= None  Dysuria on urination Grade 0= None  Urgency on urination Grade 1= Present  Frequency on urinationGrade 1= Present  Urine stream strength Strong  Urine Incontinence Grade 2= Spontaneous; pads indicated; limiting instrumental ADL, pt present with pad today  Nocturia Grade 1= Present ~3x/noc      Nursing Note:  Pt seen for OTV with Dr. Sal Cortes. Eating and drinking well. Wt Readings from Last 6 Encounters:  02/22/22 : 75.1 kg (165 lb 9.6 oz)  02/17/22 : 76.4 kg (168 lb 6.4 oz)  02/15/22 : 74.8 kg (165 lb)  02/15/22 : 74.8 kg (165 lb)  02/08/22 : 74.4 kg (164 lb)  01/26/22 : 76.2 kg (168 lb)        Jonathan Vaz, AMILCAR    Physician Note:  Subjective:  Doing well overall. Some fatigue. No bowel issues. + urinary frequency and urgency, wearing protective undergarment. Not much change from baseline. Objective:  Unchanged      Treatment setup imaging have been reviewed:   Yes    Assessment/Plan:    Continue radiotherapy per plan    Next visit:  1 week    Dr. Kelly Herzog

## 2022-02-23 PROCEDURE — 77385 HC IMRT SIMPLE: CPT | Performed by: RADIOLOGY

## 2022-02-24 ENCOUNTER — DIETICIAN VISIT (OUTPATIENT)
Dept: NUTRITION | Facility: HOSPITAL | Age: 87
End: 2022-02-24

## 2022-02-24 ENCOUNTER — TELEPHONE (OUTPATIENT)
Dept: HEMATOLOGY/ONCOLOGY | Facility: HOSPITAL | Age: 87
End: 2022-02-24

## 2022-02-24 VITALS — WEIGHT: 166.19 LBS | BODY MASS INDEX: 25 KG/M2

## 2022-02-24 LAB — TESTOSTERONE, TOTAL, S: <7 NG/DL

## 2022-02-24 PROCEDURE — 77385 HC IMRT SIMPLE: CPT | Performed by: RADIOLOGY

## 2022-02-24 NOTE — TELEPHONE ENCOUNTER
Patient's son was calling to follow up on an earlier discussion regarding cost assistance, he cannot recall the name of the drug or who he spoke to regarding cost assistance.  He was suppose to receive paperwork but has not received anything yet

## 2022-02-25 PROCEDURE — 77336 RADIATION PHYSICS CONSULT: CPT | Performed by: RADIOLOGY

## 2022-02-25 PROCEDURE — 77385 HC IMRT SIMPLE: CPT | Performed by: RADIOLOGY

## 2022-02-28 PROCEDURE — 77385 HC IMRT SIMPLE: CPT | Performed by: RADIOLOGY

## 2022-03-01 ENCOUNTER — APPOINTMENT (OUTPATIENT)
Dept: RADIATION ONCOLOGY | Facility: HOSPITAL | Age: 87
End: 2022-03-01
Attending: RADIOLOGY
Payer: MEDICARE

## 2022-03-01 ENCOUNTER — TELEPHONE (OUTPATIENT)
Dept: HEMATOLOGY/ONCOLOGY | Facility: HOSPITAL | Age: 87
End: 2022-03-01

## 2022-03-01 VITALS
HEART RATE: 80 BPM | SYSTOLIC BLOOD PRESSURE: 154 MMHG | TEMPERATURE: 97 F | DIASTOLIC BLOOD PRESSURE: 73 MMHG | WEIGHT: 167 LBS | RESPIRATION RATE: 16 BRPM | BODY MASS INDEX: 25 KG/M2 | OXYGEN SATURATION: 100 %

## 2022-03-01 DIAGNOSIS — C61 PROSTATE CANCER (HCC): Primary | ICD-10-CM

## 2022-03-01 PROCEDURE — 77385 HC IMRT SIMPLE: CPT | Performed by: RADIOLOGY

## 2022-03-01 NOTE — PROGRESS NOTES
[facility] Radiation Treatment Management Note 11-15    Patient:  Sapna Biswas  Age:  81year old  Visit Diagnosis:    1. Prostate cancer [facility])      Primary Rad/Onc:  Dr. Etelvina Mccall    Site Delivered Dose (cGy) Prescribed Dose (cGy) Fraction #   Prostate [id number] 11/28           First treatment date:  2/15/2022  Concurrent chemotherapy:  None    Oncology Vitals 2/22/2022 2/24/2022 3/1/2022   Height - - -   Height - - -   Weight 165 lb 9.6 oz 166 lb 3.2 oz 167 lb   Weight 75.116 kg 75.388 kg 75.751 kg   BSA (m2) 1.89 m2 1.89 m2 1.89 m2   /80 - 154/73   Pulse 83 - 80   Resp 16 - 16   Temp 97.5 - 97.3   SpO2 99 - 100   Pain Score 0 - 0   Some recent data might be hidden        Toxicities:  Fatigue Grade 1= Fatigue relieved by rest  Constipation Grade 0= None \"harder to get out\"  Diarrhea  Grade 0= None  Dysuria on urination Grade 0= None  Urgency on urination Grade 1= Present  Frequency on urinationGrade 1= Present  Urine stream strength Strong  Urine Incontinence Grade 2= Spontaneous; pads indicated; limiting instrumental ADL  Nocturia Grade 1= Present ~3x/noc      Nursing Note:  Pt seen for OTV with Dr. Ishmael Torre. Wt Readings from Last 6 Encounters:  03/01/22 : 75.8 kg (167 lb)  02/24/22 : 75.4 kg (166 lb 3.2 oz)  02/22/22 : 75.1 kg (165 lb 9.6 oz)  02/17/22 : 76.4 kg (168 lb 6.4 oz)  02/15/22 : 74.8 kg (165 lb)  02/15/22 : 74.8 kg (165 lb)        Gordo Rios, RN    Physician Note:  Subjective:  DOing well overall. Mild fatigue, tolerable. No bowel issues. Some urinary frequency, especially at night. Has Flomax at home but doesn't take it. Objective:  Unchanged      Treatment setup imaging have been reviewed:   Yes    Assessment/Plan:    Resume Flomax    Continue radiotherapy per plan    Next visit:  1 week    Dr. Etelvina Mccall

## 2022-03-01 NOTE — TELEPHONE ENCOUNTER
Cristy Wooten came to the  asking to speak with someone regarding the deductible for Isadore's medication. I let him know I would send a message, and he would get a call back with assistance.

## 2022-03-01 NOTE — TELEPHONE ENCOUNTER
Nik Bella called asking to speak with anyone from the Pre-authorization department regarding his father's medication that Dr Gonzalo Cabrera ordered. He said he spoke with someone before, but could not remember their name. He also could not remember the name of the drug that was ordered. \"My father can't afford the co-pay. I need someone from the Pre-Authorization department to call me back. \"     I assured Nik Bella I would forward this message to Dr Gonzalo Cabrera and the Pre-Authorization department.

## 2022-03-02 PROCEDURE — 77385 HC IMRT SIMPLE: CPT | Performed by: RADIOLOGY

## 2022-03-03 ENCOUNTER — DIETICIAN VISIT (OUTPATIENT)
Dept: NUTRITION | Facility: HOSPITAL | Age: 87
End: 2022-03-03

## 2022-03-03 VITALS — BODY MASS INDEX: 25 KG/M2 | WEIGHT: 167 LBS

## 2022-03-03 PROCEDURE — 77385 HC IMRT SIMPLE: CPT | Performed by: RADIOLOGY

## 2022-03-04 ENCOUNTER — TELEPHONE (OUTPATIENT)
Dept: HEMATOLOGY/ONCOLOGY | Facility: HOSPITAL | Age: 87
End: 2022-03-04

## 2022-03-04 PROCEDURE — 77385 HC IMRT SIMPLE: CPT | Performed by: RADIOLOGY

## 2022-03-04 PROCEDURE — 77336 RADIATION PHYSICS CONSULT: CPT | Performed by: RADIOLOGY

## 2022-03-04 NOTE — TELEPHONE ENCOUNTER
MSW intern placed follow up call to pt regarding possible need for financial assistance. Pt stated \"I am driving can you call back in about 30 mins. \"     MSW intern called pt back as requested and pt did not remember previous call. MSW intern reminded pt, and re-introduced herself. Pt was receptive to conversation. Pt stated that he is \"doing ok\" and he does \"not seem to need anything at the moment. \" MSW intern mentioned possible barriers with finances and recent struggle with paying for medication co-pay. Pt stated that \"I think I am fine financially, unless the gas prices go up. \"     Overall, pt seemed to be confused about the conversation. Pt was appreciative of the call and agreed to other financial resources being sent via mail. MSW intern placed to pt's son, Kamini Lennon, to get more detailed information. Pt's son stated that he would like more financial resources sent to his email because \"his father does not know how to use the Internet and is not connected to [address] \"     MSW intern will mail resources and send via email.      Frank Gutierrez, MSW Intern  [facility] for [address], [facility]   [phone]

## 2022-03-07 PROCEDURE — 77385 HC IMRT SIMPLE: CPT | Performed by: RADIOLOGY

## 2022-03-08 ENCOUNTER — OFFICE VISIT (OUTPATIENT)
Dept: RADIATION ONCOLOGY | Facility: HOSPITAL | Age: 87
End: 2022-03-08
Attending: RADIOLOGY
Payer: MEDICARE

## 2022-03-08 VITALS
DIASTOLIC BLOOD PRESSURE: 70 MMHG | OXYGEN SATURATION: 100 % | SYSTOLIC BLOOD PRESSURE: 139 MMHG | HEART RATE: 75 BPM | WEIGHT: 169.19 LBS | RESPIRATION RATE: 16 BRPM | BODY MASS INDEX: 26 KG/M2 | TEMPERATURE: 98 F

## 2022-03-08 DIAGNOSIS — C61 PROSTATE CANCER (HCC): Primary | ICD-10-CM

## 2022-03-08 PROCEDURE — 77385 HC IMRT SIMPLE: CPT | Performed by: RADIOLOGY

## 2022-03-08 NOTE — PROGRESS NOTES
[facility] Radiation Treatment Management Note 16-20    Patient:  Juliette Adam  Age:  81year old  Visit Diagnosis:    1. Prostate cancer [facility])      Primary Rad/Onc:  Dr. Mayte Sanchez    Site Delivered Dose (cGy) Prescribed Dose (cGy) Fraction #   Prostate [id number] 16/28           First treatment date: 2/15/2022  Concurrent chemotherapy: None    Oncology Vitals 2/24/2022 3/1/2022 3/3/2022   Weight 166 lb 3.2 oz 167 lb 167 lb   Weight 75.388 kg 75.751 kg 75.751 kg   BSA (m2) 1.89 m2 1.89 m2 1.89 m2   BP - 154/73 -   Pulse - 80 -   Resp - 16 -   Temp - 97.3 -   SpO2 - 100 -   Pain Score - 0 -   Some recent data might be hidden        Toxicities:  Fatigue Grade 1= Fatigue relieved by rest  Constipation Grade 0= None  Diarrhea  Grade 0= None  Dysuria on urination Grade 1= Present, pt made aware can take tylenol or ibuprofen. Urgency on urination Grade 0= None  Frequency on urinationGrade 1= Present  Urine stream strength Strong  Urine Incontinence Grade 0= None  Nocturia Grade 1= Present, drinks late into the night. Nursing Note:  Wt Readings from Last 6 Encounters:  03/08/22 : 76.7 kg (169 lb 3.2 oz)  03/03/22 : 75.8 kg (167 lb)  03/01/22 : 75.8 kg (167 lb)  02/24/22 : 75.4 kg (166 lb 3.2 oz)  02/22/22 : 75.1 kg (165 lb 9.6 oz)  02/17/22 : 76.4 kg (168 lb 6.4 oz)    Eating and drinking well. No new meds. Denies hot flashes from the Lupron. Dr. Denys Gates to see pt. Ruth Wright RN    Physician Note:  Subjective:  DOing well, some mild dysuria. No other c/o. No bowel issues. Stable fatigue. Objective:  Uncvhanged      Treatment setup imaging have been reviewed:   Yes    Assessment/Plan:    Will try Azo    Continue radiotherapy per plan    Next visit:  1 week    Dr. Mayte Sanchez

## 2022-03-09 PROCEDURE — 77385 HC IMRT SIMPLE: CPT | Performed by: RADIOLOGY

## 2022-03-10 PROCEDURE — 77385 HC IMRT SIMPLE: CPT | Performed by: RADIOLOGY

## 2022-03-11 PROCEDURE — 77336 RADIATION PHYSICS CONSULT: CPT | Performed by: RADIOLOGY

## 2022-03-11 PROCEDURE — 77385 HC IMRT SIMPLE: CPT | Performed by: RADIOLOGY

## 2022-03-14 PROCEDURE — 77385 HC IMRT SIMPLE: CPT | Performed by: RADIOLOGY

## 2022-03-14 NOTE — PROGRESS NOTES
[facility] Radiation Treatment Management Note 21-25    Patient:  Shirlene Husain  Age:  81year old  Visit Diagnosis:    1. Prostate cancer [facility])      Primary Rad/Onc:  Dr. Orlando Hinton    Site Delivered Dose (cGy) Prescribed Dose (cGy) Fraction #   Prostate [id number] 21/28           First treatment date:  2/15/2022  Concurrent chemotherapy:  None    Oncology Vitals 3/1/2022 3/3/2022 3/8/2022   Weight 167 lb 167 lb 169 lb 3.2 oz   Weight 75.751 kg 75.751 kg 76.749 kg   BSA (m2) 1.89 m2 1.89 m2 1.9 m2   /73 - 139/70   Pulse 80 - 75   Resp 16 - 16   Temp 97.3 - 98.1   SpO2 100 - 100   Pain Score 0 - 0   Some recent data might be hidden        Toxicities:  Fatigue Grade 1= Fatigue relieved by rest  Constipation Grade 0= None  Diarrhea  Grade 0= None  Dysuria on urination Grade 1= Present, tylenol tried 1x-to try Azo. Pt hasn't picked up Azo to try yet. Does report hematuria x2  Urgency on urination Grade 1= Present  Frequency on urinationGrade 1= Present  Urine stream strength Strong, \"strong but not as strong as it was\"  Urine Incontinence Grade 0= None  Nocturia Grade 1= Present, discussed limiting PO intake before bed      Nursing Note:  Pt seen for OTV with Dr. Darrold Brittle. Eating and drinking well. Pt reports 2 episodes of hematuria following tylenol. Only took tylenol 1 time. To  Azo to try. Wt Readings from Last 6 Encounters:  03/15/22 : 75.9 kg (167 lb 6.4 oz)  03/15/22 : 76.7 kg (169 lb)  03/08/22 : 76.7 kg (169 lb 3.2 oz)  03/03/22 : 75.8 kg (167 lb)  03/01/22 : 75.8 kg (167 lb)  02/24/22 : 75.4 kg (166 lb 3.2 oz)          Melissa Boucher RN    Physician Note:  Subjective:  Doing well overall. Some fatigue and some minor dysuria. Did not  Azo as discussed last week. Had some mild hematuria x2, now resolved. Normal appetite. Objective:  Unchanged      Treatment setup imaging have been reviewed:   Yes    Assessment/Plan:    Will try Azo    Continue radiotherapy per plan    Next visit:  1 week    Dr. Annie Mcknight

## 2022-03-15 ENCOUNTER — OFFICE VISIT (OUTPATIENT)
Dept: RADIATION ONCOLOGY | Facility: HOSPITAL | Age: 87
End: 2022-03-15
Attending: RADIOLOGY
Payer: MEDICARE

## 2022-03-15 ENCOUNTER — OFFICE VISIT (OUTPATIENT)
Dept: HEMATOLOGY/ONCOLOGY | Facility: HOSPITAL | Age: 87
End: 2022-03-15
Attending: INTERNAL MEDICINE
Payer: MEDICARE

## 2022-03-15 VITALS
SYSTOLIC BLOOD PRESSURE: 138 MMHG | BODY MASS INDEX: 25 KG/M2 | RESPIRATION RATE: 16 BRPM | DIASTOLIC BLOOD PRESSURE: 76 MMHG | HEART RATE: 105 BPM | TEMPERATURE: 97 F | OXYGEN SATURATION: 99 % | WEIGHT: 167.38 LBS

## 2022-03-15 VITALS
OXYGEN SATURATION: 100 % | DIASTOLIC BLOOD PRESSURE: 72 MMHG | RESPIRATION RATE: 16 BRPM | HEIGHT: 68 IN | HEART RATE: 89 BPM | SYSTOLIC BLOOD PRESSURE: 123 MMHG | BODY MASS INDEX: 25.61 KG/M2 | TEMPERATURE: 98 F | WEIGHT: 169 LBS

## 2022-03-15 DIAGNOSIS — C61 PROSTATE CANCER (HCC): Primary | ICD-10-CM

## 2022-03-15 PROCEDURE — 99214 OFFICE O/P EST MOD 30 MIN: CPT | Performed by: INTERNAL MEDICINE

## 2022-03-15 PROCEDURE — 77385 HC IMRT SIMPLE: CPT | Performed by: RADIOLOGY

## 2022-03-16 ENCOUNTER — TELEPHONE (OUTPATIENT)
Dept: HEMATOLOGY/ONCOLOGY | Facility: HOSPITAL | Age: 87
End: 2022-03-16

## 2022-03-16 PROCEDURE — 77385 HC IMRT SIMPLE: CPT | Performed by: RADIOLOGY

## 2022-03-16 NOTE — TELEPHONE ENCOUNTER
Dani Mcarthur is calling for an order clarification on Medication Zytiga 250 mg Tablet, The order can be faxed to [phone]

## 2022-03-17 PROCEDURE — 77385 HC IMRT SIMPLE: CPT | Performed by: RADIOLOGY

## 2022-03-18 PROCEDURE — 77385 HC IMRT SIMPLE: CPT | Performed by: RADIOLOGY

## 2022-03-18 PROCEDURE — 77336 RADIATION PHYSICS CONSULT: CPT | Performed by: RADIOLOGY

## 2022-03-21 ENCOUNTER — TELEPHONE (OUTPATIENT)
Dept: HEMATOLOGY/ONCOLOGY | Facility: HOSPITAL | Age: 87
End: 2022-03-21

## 2022-03-21 PROCEDURE — 77385 HC IMRT SIMPLE: CPT | Performed by: RADIOLOGY

## 2022-03-21 NOTE — PROGRESS NOTES
[facility] Radiation Treatment Management Note 26-30    Patient:  Ac Garcia  Age:  81year old  Visit Diagnosis:  No diagnosis found. Primary Rad/Onc:  Dr. Kelly Herzog    Site Delivered Dose (cGy) Prescribed Dose (cGy) Fraction #   Prostate [id number] 48/55           First treatment date: 2/15/2022  Concurrent chemotherapy: None    Oncology Vitals 3/8/2022 3/15/2022 3/15/2022   Height - 5' 8\" -   Height - 173 cm -   Weight 169 lb 3.2 oz 169 lb 167 lb 6.4 oz   Weight 76.749 kg 76.658 kg 75.932 kg   BSA (m2) 1.9 m2 1.9 m2 1.89 m2   /70 123/72 138/76   Pulse 75 89 105   Resp 16 16 16   Temp 98.1 98.4 97.3   SpO2 100 100 99   Pain Score 0 0 0   Some recent data might be hidden        Toxicities:  Fatigue Grade 0= None  Constipation Grade 0= None  Diarrhea  Grade 0= None  Dysuria on urination Grade 1= Present, has not bought AZO. Will go to store today. Urgency on urination Grade 1= Present  Frequency on urinationGrade 1= Present  Urine stream strength Moderate  Urine Incontinence Grade 2= Spontaneous; pads indicated; limiting instrumental ADL  Nocturia Grade 1= Present, getting up 3-4 nightly. Nursing Note:  Wt Readings from Last 6 Encounters:  03/22/22 : 76.8 kg (169 lb 6.4 oz)  03/15/22 : 75.9 kg (167 lb 6.4 oz)  03/15/22 : 76.7 kg (169 lb)  03/08/22 : 76.7 kg (169 lb 3.2 oz)  03/03/22 : 75.8 kg (167 lb)  03/01/22 : 75.8 kg (167 lb)    Eating and drinking well. Dr. Live Hiss to see pt. Laura Millard, AMILCAR    Physician Note:  Subjective:    Mild dysuria       Objective:  NAD      Treatment setup imaging have been reviewed:   Yes    Assessment/Plan:    High risk pca, whole pelvis+ bst    Reviewed potential side effects  To start OTC tylenol, pyridium for dysuria       Continue radiotherapy per plan    Next visit:  1 week    Dr. Ezekiel Pascal for Kelly Herzog MD

## 2022-03-21 NOTE — TELEPHONE ENCOUNTER
Spoke with patient and confirmed that he received his delivery of his Abiraterone. Patient will bring his medication with him tomorrow and knows not to take any medication until we check the dosing. Patient stated understanding and will ask for me when he arrives. Patient thanked me for the call.

## 2022-03-22 ENCOUNTER — OFFICE VISIT (OUTPATIENT)
Dept: RADIATION ONCOLOGY | Facility: HOSPITAL | Age: 87
End: 2022-03-22
Attending: RADIOLOGY
Payer: MEDICARE

## 2022-03-22 ENCOUNTER — TELEPHONE (OUTPATIENT)
Dept: HEMATOLOGY/ONCOLOGY | Facility: HOSPITAL | Age: 87
End: 2022-03-22

## 2022-03-22 VITALS
DIASTOLIC BLOOD PRESSURE: 74 MMHG | HEART RATE: 81 BPM | BODY MASS INDEX: 26 KG/M2 | OXYGEN SATURATION: 100 % | TEMPERATURE: 97 F | RESPIRATION RATE: 16 BRPM | SYSTOLIC BLOOD PRESSURE: 138 MMHG | WEIGHT: 169.38 LBS

## 2022-03-22 PROCEDURE — 77385 HC IMRT SIMPLE: CPT | Performed by: RADIOLOGY

## 2022-03-22 NOTE — PATIENT INSTRUCTIONS
Follow up with Dr. Denys Gates in 4 weeks. Pan Muse will call you to schedule your follow up appointment. Please call [phone] with any radiation questions.

## 2022-03-22 NOTE — TELEPHONE ENCOUNTER
Patient brought in his Abiraterone  Prescription along with Prednisone. This nurse verified that there was 250mg of Abiraterone and 5mg of Prednisone that were dispensed to the patient. Patient was instructed that he needs to take 1000mg Daily of Abiraterone 1 hour before or two hours after a meal. Patient was also informed that he will need to take the Prednisone 5mg/daily along with the Abiraterone. Patient stated understanding. He was informed that he will need to get his labs drawn prior to his schedule follow up visit with Dr. Elton Queen at the end of April. Patient instructed to begin his Medications tomorrow and he stated understanding. Patient informed to call our office if he has any questions regarding his Medication.

## 2022-03-23 PROCEDURE — 77385 HC IMRT SIMPLE: CPT | Performed by: RADIOLOGY

## 2022-03-24 PROCEDURE — 77336 RADIATION PHYSICS CONSULT: CPT | Performed by: RADIOLOGY

## 2022-03-24 PROCEDURE — 77385 HC IMRT SIMPLE: CPT | Performed by: RADIOLOGY

## 2022-03-29 ENCOUNTER — TELEPHONE (OUTPATIENT)
Dept: HEMATOLOGY/ONCOLOGY | Facility: HOSPITAL | Age: 87
End: 2022-03-29

## 2022-03-29 NOTE — TELEPHONE ENCOUNTER
Called the patient and explained miscalculation of his risk profile as he only has high risk disease and does not need to take the extra step of taking abiraterone+prednisone. Patient understood these instructions, has not suffered any side effects from oral ADT and thanked me for the call. Explained the situation to his two sons who were appreciative of the call and information. He will see me at his planned follow up in April, 2022.

## 2022-04-22 ENCOUNTER — APPOINTMENT (OUTPATIENT)
Dept: RADIATION ONCOLOGY | Facility: HOSPITAL | Age: 87
End: 2022-04-22
Attending: RADIOLOGY
Payer: MEDICARE

## 2022-04-22 RX ORDER — TAMSULOSIN HYDROCHLORIDE 0.4 MG/1
CAPSULE ORAL
Qty: 90 CAPSULE | Refills: 1 | Status: SHIPPED | OUTPATIENT
Start: 2022-04-22

## 2022-04-25 ENCOUNTER — TELEPHONE (OUTPATIENT)
Dept: RADIATION ONCOLOGY | Facility: HOSPITAL | Age: 87
End: 2022-04-25

## 2022-04-26 ENCOUNTER — TELEPHONE (OUTPATIENT)
Dept: HEMATOLOGY/ONCOLOGY | Facility: HOSPITAL | Age: 87
End: 2022-04-26

## 2022-04-26 ENCOUNTER — OFFICE VISIT (OUTPATIENT)
Dept: INTERNAL MEDICINE CLINIC | Facility: CLINIC | Age: 87
End: 2022-04-26
Payer: MEDICARE

## 2022-04-26 VITALS
SYSTOLIC BLOOD PRESSURE: 126 MMHG | HEIGHT: 68 IN | WEIGHT: 166 LBS | RESPIRATION RATE: 14 BRPM | OXYGEN SATURATION: 100 % | BODY MASS INDEX: 25.16 KG/M2 | DIASTOLIC BLOOD PRESSURE: 70 MMHG | HEART RATE: 78 BPM

## 2022-04-26 DIAGNOSIS — E11.9 TYPE 2 DIABETES MELLITUS WITHOUT COMPLICATION, WITHOUT LONG-TERM CURRENT USE OF INSULIN (HCC): ICD-10-CM

## 2022-04-26 DIAGNOSIS — M96.1 POSTLAMINECTOMY SYNDROME, LUMBAR REGION: ICD-10-CM

## 2022-04-26 DIAGNOSIS — I10 HYPERTENSION, BENIGN: ICD-10-CM

## 2022-04-26 DIAGNOSIS — E78.5 HYPERLIPIDEMIA, UNSPECIFIED HYPERLIPIDEMIA TYPE: Primary | ICD-10-CM

## 2022-04-26 DIAGNOSIS — N40.0 BENIGN PROSTATIC HYPERPLASIA WITHOUT LOWER URINARY TRACT SYMPTOMS: ICD-10-CM

## 2022-04-26 DIAGNOSIS — M51.37 DEGENERATION OF LUMBAR OR LUMBOSACRAL INTERVERTEBRAL DISC: ICD-10-CM

## 2022-04-26 DIAGNOSIS — E11.40 TYPE 2 DIABETES MELLITUS WITH DIABETIC NEUROPATHY, WITHOUT LONG-TERM CURRENT USE OF INSULIN (HCC): Chronic | ICD-10-CM

## 2022-04-26 DIAGNOSIS — G60.9 HEREDITARY AND IDIOPATHIC PERIPHERAL NEUROPATHY: ICD-10-CM

## 2022-04-26 DIAGNOSIS — C61 PROSTATE CANCER (HCC): ICD-10-CM

## 2022-04-26 DIAGNOSIS — D64.9 ANEMIA, UNSPECIFIED TYPE: ICD-10-CM

## 2022-04-26 PROBLEM — Z87.19 HISTORY OF DIVERTICULOSIS: Status: RESOLVED | Noted: 2021-05-03 | Resolved: 2022-04-26

## 2022-04-26 PROCEDURE — 99397 PER PM REEVAL EST PAT 65+ YR: CPT | Performed by: INTERNAL MEDICINE

## 2022-04-26 PROCEDURE — 3078F DIAST BP <80 MM HG: CPT | Performed by: INTERNAL MEDICINE

## 2022-04-26 PROCEDURE — 96160 PT-FOCUSED HLTH RISK ASSMT: CPT | Performed by: INTERNAL MEDICINE

## 2022-04-26 PROCEDURE — G0439 PPPS, SUBSEQ VISIT: HCPCS | Performed by: INTERNAL MEDICINE

## 2022-04-26 PROCEDURE — 3008F BODY MASS INDEX DOCD: CPT | Performed by: INTERNAL MEDICINE

## 2022-04-26 PROCEDURE — 3074F SYST BP LT 130 MM HG: CPT | Performed by: INTERNAL MEDICINE

## 2022-04-26 NOTE — TELEPHONE ENCOUNTER
Patient had death in his family and he has to go out of town and he have a graduation out of town also and he will be scheduling an appointment with Dr. Elton Queen and his labs at a later date.

## 2022-04-26 NOTE — PROGRESS NOTES
Patient went to [facility] in [facility] on 1/2022. will obtain report.

## 2022-05-11 ENCOUNTER — TELEPHONE (OUTPATIENT)
Dept: RADIATION ONCOLOGY | Facility: HOSPITAL | Age: 87
End: 2022-05-11

## 2022-05-27 RX ORDER — LOVASTATIN 20 MG/1
TABLET ORAL
Qty: 90 TABLET | Refills: 1 | Status: SHIPPED | OUTPATIENT
Start: 2022-05-27

## 2022-05-27 NOTE — TELEPHONE ENCOUNTER
Please review; protocol failed/no protocol    No ordered at 4/26/2022 office visit    Please send, if appropriate      Requested Prescriptions   Pending Prescriptions Disp Refills    LOVASTATIN 20 MG Oral Tab [Pharmacy Med Name: LOVASTATIN 20MG TABLETS] 90 tablet 1     Sig: TAKE 1 TABLET BY MOUTH AT NIGHT        Cholesterol Medication Protocol Failed - 5/26/2022  2:46 PM        Failed - ALT in past 12 months        Failed - Last ALT < 80       Lab Results   Component Value Date    ALT 23 03/26/2021             Passed - LDL in past 12 months        Passed - Last LDL < 130     Lab Results   Component Value Date    LDL 74 02/12/2022               Passed - Appointment in past 12 or next 3 months               Recent Outpatient Visits              1 month ago Hyperlipidemia, unspecified hyperlipidemia type    Cheryl Pulliam MD    Office Visit    2 months ago     Valencia Mason MD    Office Visit    2 months ago Prostate cancer [facility])    [facility] Hematology Oncology John Echevarria MD    Office Visit    2 months ago Prostate cancer [facility])    Trini Ricks MD    Office Visit    2 months ago Prostate [facility])    Trini Ricks MD    Office Visit             Future Appointments         Provider Department Appt Notes    In 1 month Raymond Negrete 19 Hematology Oncology Follow up-Pt will do outpatient labs prior-LS    In 2 months Sandy Valencia MD [facility], [facility], [address] [place] [address] 3 mo f/u  (policy informed)

## 2022-06-20 RX ORDER — LISINOPRIL AND HYDROCHLOROTHIAZIDE 20; 12.5 MG/1; MG/1
2 TABLET ORAL DAILY
Qty: 180 TABLET | Refills: 1 | Status: SHIPPED | OUTPATIENT
Start: 2022-06-20

## 2022-06-24 ENCOUNTER — LAB ENCOUNTER (OUTPATIENT)
Dept: LAB | Facility: HOSPITAL | Age: 87
End: 2022-06-24
Attending: INTERNAL MEDICINE
Payer: MEDICARE

## 2022-06-24 DIAGNOSIS — C61 PROSTATE CANCER (HCC): ICD-10-CM

## 2022-06-24 LAB
ALBUMIN SERPL-MCNC: 3.7 G/DL (ref 3.4–5)
ALBUMIN/GLOB SERPL: 0.9 {RATIO} (ref 1–2)
ALP LIVER SERPL-CCNC: 54 U/L
ALT SERPL-CCNC: 23 U/L
ANION GAP SERPL CALC-SCNC: 8 MMOL/L (ref 0–18)
AST SERPL-CCNC: 23 U/L (ref 15–37)
BASOPHILS # BLD AUTO: 0.02 X10(3) UL (ref 0–0.2)
BASOPHILS NFR BLD AUTO: 0.6 %
BILIRUB SERPL-MCNC: 0.4 MG/DL (ref 0.1–2)
BUN BLD-MCNC: 22 MG/DL (ref 7–18)
BUN/CREAT SERPL: 20.4 (ref 10–20)
CALCIUM BLD-MCNC: 10.2 MG/DL (ref 8.5–10.1)
CHLORIDE SERPL-SCNC: 106 MMOL/L (ref 98–112)
CO2 SERPL-SCNC: 30 MMOL/L (ref 21–32)
CREAT BLD-MCNC: 1.08 MG/DL
DEPRECATED RDW RBC AUTO: 50.1 FL (ref 35.1–46.3)
EOSINOPHIL # BLD AUTO: 0.17 X10(3) UL (ref 0–0.7)
EOSINOPHIL NFR BLD AUTO: 5.3 %
ERYTHROCYTE [DISTWIDTH] IN BLOOD BY AUTOMATED COUNT: 16 % (ref 11–15)
FASTING STATUS PATIENT QL REPORTED: YES
GLOBULIN PLAS-MCNC: 4 G/DL (ref 2.8–4.4)
GLUCOSE BLD-MCNC: 115 MG/DL (ref 70–99)
HCT VFR BLD AUTO: 33.6 %
HGB BLD-MCNC: 10.2 G/DL
IMM GRANULOCYTES # BLD AUTO: 0.01 X10(3) UL (ref 0–1)
IMM GRANULOCYTES NFR BLD: 0.3 %
LYMPHOCYTES # BLD AUTO: 0.79 X10(3) UL (ref 1–4)
LYMPHOCYTES NFR BLD AUTO: 24.6 %
MCH RBC QN AUTO: 26 PG (ref 26–34)
MCHC RBC AUTO-ENTMCNC: 30.4 G/DL (ref 31–37)
MCV RBC AUTO: 85.7 FL
MONOCYTES # BLD AUTO: 0.46 X10(3) UL (ref 0.1–1)
MONOCYTES NFR BLD AUTO: 14.3 %
NEUTROPHILS # BLD AUTO: 1.76 X10 (3) UL (ref 1.5–7.7)
NEUTROPHILS # BLD AUTO: 1.76 X10(3) UL (ref 1.5–7.7)
NEUTROPHILS NFR BLD AUTO: 54.9 %
OSMOLALITY SERPL CALC.SUM OF ELEC: 302 MOSM/KG (ref 275–295)
PLATELET # BLD AUTO: 195 10(3)UL (ref 150–450)
POTASSIUM SERPL-SCNC: 4 MMOL/L (ref 3.5–5.1)
PROT SERPL-MCNC: 7.7 G/DL (ref 6.4–8.2)
PSA SERPL-MCNC: 0.02 NG/ML (ref ?–4)
RBC # BLD AUTO: 3.92 X10(6)UL
SODIUM SERPL-SCNC: 144 MMOL/L (ref 136–145)
WBC # BLD AUTO: 3.2 X10(3) UL (ref 4–11)

## 2022-06-24 PROCEDURE — 85025 COMPLETE CBC W/AUTO DIFF WBC: CPT

## 2022-06-24 PROCEDURE — 36415 COLL VENOUS BLD VENIPUNCTURE: CPT

## 2022-06-24 PROCEDURE — 80053 COMPREHEN METABOLIC PANEL: CPT

## 2022-06-24 PROCEDURE — 84153 ASSAY OF PSA TOTAL: CPT

## 2022-06-28 ENCOUNTER — OFFICE VISIT (OUTPATIENT)
Dept: HEMATOLOGY/ONCOLOGY | Facility: HOSPITAL | Age: 87
End: 2022-06-28
Attending: INTERNAL MEDICINE
Payer: MEDICARE

## 2022-06-28 VITALS
OXYGEN SATURATION: 100 % | DIASTOLIC BLOOD PRESSURE: 77 MMHG | WEIGHT: 171 LBS | SYSTOLIC BLOOD PRESSURE: 145 MMHG | HEIGHT: 68 IN | RESPIRATION RATE: 16 BRPM | TEMPERATURE: 99 F | BODY MASS INDEX: 25.91 KG/M2 | HEART RATE: 74 BPM

## 2022-06-28 DIAGNOSIS — C61 PROSTATE CANCER (HCC): Primary | ICD-10-CM

## 2022-06-28 DIAGNOSIS — Z79.818 ENCOUNTER FOR MONITORING ANDROGEN DEPRIVATION THERAPY: ICD-10-CM

## 2022-06-28 PROCEDURE — 99214 OFFICE O/P EST MOD 30 MIN: CPT | Performed by: INTERNAL MEDICINE

## 2022-07-07 ENCOUNTER — NURSE ONLY (OUTPATIENT)
Dept: HEMATOLOGY/ONCOLOGY | Facility: HOSPITAL | Age: 87
End: 2022-07-07
Attending: INTERNAL MEDICINE
Payer: MEDICARE

## 2022-07-07 VITALS
DIASTOLIC BLOOD PRESSURE: 74 MMHG | RESPIRATION RATE: 16 BRPM | TEMPERATURE: 98 F | SYSTOLIC BLOOD PRESSURE: 140 MMHG | HEART RATE: 78 BPM | OXYGEN SATURATION: 99 %

## 2022-07-07 DIAGNOSIS — C61 PROSTATE CANCER (HCC): Primary | ICD-10-CM

## 2022-07-07 PROCEDURE — 96402 CHEMO HORMON ANTINEOPL SQ/IM: CPT

## 2022-07-07 NOTE — PROGRESS NOTES
Patient here for Trelstart. Received Eligard 6 months ago. Patient said that he got a lump after he got the injection. Denied any rash or itching. Patient states he forgot to tell Dr William Hayes that when he got home after he got Eligard he passed out in the bathroom. Reviewed medication profile with route and frequency. Patient friend Eileen Giron came in to Lab. Patient wanted Nora to hear about the Trelstar injection. I printed information on Trelstar and gave to patient so that he can review at home. Trelstar given IM in Right upper outer gluteal muscle. Tolerated injection well. Site covered with a band-aide. I observed the patient for 30 minutes. Patient reported he felt fine with stable VS.  Denied feeling dizzy or lightheaded. I instructed that if he has episode of passing out at home to call 911. Both patient and his friend Eileen Giron verbalized understanding. Printed AVS, reviewed next appointment date and time. Discharged stable, ambulatory with his friend Eileen Giron.

## 2022-08-01 RX ORDER — METFORMIN HYDROCHLORIDE 500 MG/1
TABLET, EXTENDED RELEASE ORAL
Qty: 90 TABLET | Refills: 1 | Status: SHIPPED | OUTPATIENT
Start: 2022-08-01

## 2022-08-01 RX ORDER — AMLODIPINE BESYLATE 5 MG/1
TABLET ORAL
Qty: 90 TABLET | Refills: 3 | Status: SHIPPED | OUTPATIENT
Start: 2022-08-01

## 2022-08-04 ENCOUNTER — OFFICE VISIT (OUTPATIENT)
Facility: CLINIC | Age: 87
End: 2022-08-04
Payer: MEDICARE

## 2022-08-04 VITALS
HEART RATE: 86 BPM | RESPIRATION RATE: 14 BRPM | DIASTOLIC BLOOD PRESSURE: 60 MMHG | BODY MASS INDEX: 25.76 KG/M2 | WEIGHT: 170 LBS | SYSTOLIC BLOOD PRESSURE: 110 MMHG | OXYGEN SATURATION: 99 % | HEIGHT: 68 IN

## 2022-08-04 DIAGNOSIS — I10 HYPERTENSION, BENIGN: ICD-10-CM

## 2022-08-04 DIAGNOSIS — E11.9 TYPE 2 DIABETES MELLITUS WITHOUT COMPLICATION, WITHOUT LONG-TERM CURRENT USE OF INSULIN (HCC): Primary | ICD-10-CM

## 2022-08-04 DIAGNOSIS — E78.5 HYPERLIPIDEMIA, UNSPECIFIED HYPERLIPIDEMIA TYPE: ICD-10-CM

## 2022-08-04 DIAGNOSIS — C61 PROSTATE CANCER (HCC): ICD-10-CM

## 2022-08-04 PROCEDURE — 99214 OFFICE O/P EST MOD 30 MIN: CPT | Performed by: INTERNAL MEDICINE

## 2022-08-04 PROCEDURE — 3078F DIAST BP <80 MM HG: CPT | Performed by: INTERNAL MEDICINE

## 2022-08-04 PROCEDURE — 1126F AMNT PAIN NOTED NONE PRSNT: CPT | Performed by: INTERNAL MEDICINE

## 2022-08-04 PROCEDURE — 3008F BODY MASS INDEX DOCD: CPT | Performed by: INTERNAL MEDICINE

## 2022-08-04 PROCEDURE — 3074F SYST BP LT 130 MM HG: CPT | Performed by: INTERNAL MEDICINE

## 2022-08-04 NOTE — PROGRESS NOTES
Per patient he went to [facility] on 1/2022 for his Dilated Eye Exam.Will obtain report from Dr.Christopher May

## 2022-09-09 ENCOUNTER — EKG ENCOUNTER (OUTPATIENT)
Dept: LAB | Age: 87
End: 2022-09-09
Attending: INTERNAL MEDICINE
Payer: MEDICARE

## 2022-09-09 ENCOUNTER — LAB ENCOUNTER (OUTPATIENT)
Dept: LAB | Age: 87
End: 2022-09-09
Attending: INTERNAL MEDICINE
Payer: MEDICARE

## 2022-09-09 ENCOUNTER — OFFICE VISIT (OUTPATIENT)
Dept: INTERNAL MEDICINE CLINIC | Facility: CLINIC | Age: 87
End: 2022-09-09
Payer: MEDICARE

## 2022-09-09 ENCOUNTER — NURSE TRIAGE (OUTPATIENT)
Dept: INTERNAL MEDICINE CLINIC | Facility: CLINIC | Age: 87
End: 2022-09-09

## 2022-09-09 VITALS
HEIGHT: 68 IN | WEIGHT: 175 LBS | HEART RATE: 89 BPM | SYSTOLIC BLOOD PRESSURE: 132 MMHG | BODY MASS INDEX: 26.52 KG/M2 | DIASTOLIC BLOOD PRESSURE: 81 MMHG

## 2022-09-09 DIAGNOSIS — D64.9 ANEMIA, UNSPECIFIED TYPE: ICD-10-CM

## 2022-09-09 DIAGNOSIS — R01.1 HEART MURMUR: ICD-10-CM

## 2022-09-09 DIAGNOSIS — R55 SYNCOPE, UNSPECIFIED SYNCOPE TYPE: ICD-10-CM

## 2022-09-09 DIAGNOSIS — R55 SYNCOPE, UNSPECIFIED SYNCOPE TYPE: Primary | ICD-10-CM
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
## 2022-09-09 LAB
ALBUMIN SERPL-MCNC: 3.7 G/DL (ref 3.4–5)
ALBUMIN/GLOB SERPL: 0.9 {RATIO} (ref 1–2)
ALP LIVER SERPL-CCNC: 70 U/L
ALT SERPL-CCNC: 20 U/L
ANION GAP SERPL CALC-SCNC: 6 MMOL/L (ref 0–18)
AST SERPL-CCNC: 19 U/L (ref 15–37)
BASOPHILS # BLD AUTO: 0.02 X10(3) UL (ref 0–0.2)
BASOPHILS NFR BLD AUTO: 0.6 %
BILIRUB SERPL-MCNC: 0.3 MG/DL (ref 0.1–2)
BUN BLD-MCNC: 23 MG/DL (ref 7–18)
BUN/CREAT SERPL: 20 (ref 10–20)
CALCIUM BLD-MCNC: 10.3 MG/DL (ref 8.5–10.1)
CHLORIDE SERPL-SCNC: 104 MMOL/L (ref 98–112)
CO2 SERPL-SCNC: 32 MMOL/L (ref 21–32)
CREAT BLD-MCNC: 1.15 MG/DL
DEPRECATED RDW RBC AUTO: 46 FL (ref 35.1–46.3)
EOSINOPHIL # BLD AUTO: 0.12 X10(3) UL (ref 0–0.7)
EOSINOPHIL NFR BLD AUTO: 3.5 %
ERYTHROCYTE [DISTWIDTH] IN BLOOD BY AUTOMATED COUNT: 14.9 % (ref 11–15)
FASTING STATUS PATIENT QL REPORTED: NO
GFR SERPLBLD BASED ON 1.73 SQ M-ARVRAT: 62 ML/MIN/1.73M2 (ref 60–?)
GLOBULIN PLAS-MCNC: 4.1 G/DL (ref 2.8–4.4)
GLUCOSE BLD-MCNC: 134 MG/DL (ref 70–99)
HCT VFR BLD AUTO: 32.8 %
HGB BLD-MCNC: 10.1 G/DL
IMM GRANULOCYTES # BLD AUTO: 0.01 X10(3) UL (ref 0–1)
IMM GRANULOCYTES NFR BLD: 0.3 %
LYMPHOCYTES # BLD AUTO: 0.76 X10(3) UL (ref 1–4)
LYMPHOCYTES NFR BLD AUTO: 22.4 %
MCH RBC QN AUTO: 25.8 PG (ref 26–34)
MCHC RBC AUTO-ENTMCNC: 30.8 G/DL (ref 31–37)
MCV RBC AUTO: 83.9 FL
MONOCYTES # BLD AUTO: 0.51 X10(3) UL (ref 0.1–1)
MONOCYTES NFR BLD AUTO: 15 %
NEUTROPHILS # BLD AUTO: 1.98 X10 (3) UL (ref 1.5–7.7)
NEUTROPHILS # BLD AUTO: 1.98 X10(3) UL (ref 1.5–7.7)
NEUTROPHILS NFR BLD AUTO: 58.2 %
OSMOLALITY SERPL CALC.SUM OF ELEC: 300 MOSM/KG (ref 275–295)
PLATELET # BLD AUTO: 235 10(3)UL (ref 150–450)
POTASSIUM SERPL-SCNC: 3.7 MMOL/L (ref 3.5–5.1)
PROT SERPL-MCNC: 7.8 G/DL (ref 6.4–8.2)
RBC # BLD AUTO: 3.91 X10(6)UL
SODIUM SERPL-SCNC: 142 MMOL/L (ref 136–145)
WBC # BLD AUTO: 3.4 X10(3) UL (ref 4–11)

## 2022-09-09 PROCEDURE — 85025 COMPLETE CBC W/AUTO DIFF WBC: CPT

## 2022-09-09 PROCEDURE — 99215 OFFICE O/P EST HI 40 MIN: CPT | Performed by: INTERNAL MEDICINE

## 2022-09-09 PROCEDURE — 93005 ELECTROCARDIOGRAM TRACING: CPT

## 2022-09-09 PROCEDURE — 93010 ELECTROCARDIOGRAM REPORT: CPT | Performed by: INTERNAL MEDICINE

## 2022-09-09 PROCEDURE — 83540 ASSAY OF IRON: CPT

## 2022-09-09 PROCEDURE — 3079F DIAST BP 80-89 MM HG: CPT | Performed by: INTERNAL MEDICINE

## 2022-09-09 PROCEDURE — 36415 COLL VENOUS BLD VENIPUNCTURE: CPT

## 2022-09-09 PROCEDURE — 3075F SYST BP GE 130 - 139MM HG: CPT | Performed by: INTERNAL MEDICINE

## 2022-09-09 PROCEDURE — 80053 COMPREHEN METABOLIC PANEL: CPT

## 2022-09-09 PROCEDURE — 3008F BODY MASS INDEX DOCD: CPT | Performed by: INTERNAL MEDICINE

## 2022-09-09 PROCEDURE — 84466 ASSAY OF TRANSFERRIN: CPT

## 2022-09-09 PROCEDURE — 1126F AMNT PAIN NOTED NONE PRSNT: CPT | Performed by: INTERNAL MEDICINE

## 2022-09-09 NOTE — TELEPHONE ENCOUNTER
Pt stated that he passed out yesterday but he feels well now. Pt stated that he was eating and he got up and to him he just layed down on the floor as he was thinking it was bed. Then he remembers telling himself what am I doing down here. Pt stated that he does not believe he hit his head and he thinks he was down there for about 20 min. Pt lives alone. Pt denied any chest pain or sob. Pt mentioned that for the past 3 weeks he has noticed that the top of his head gets sweaty this happens about 3 times in a week and he has been craving sweets. Pt stated that today he feel fine. He even went to the romero today. He mentioned to his son what happened to him and he advised him to call his doctor. Pt stated that he feels fine at present moment. No chest pain, sob, dizziness, weakness. Pt was informed he needs to see a provider today for a evaluation. Pt agreed and he will see Lea Milner today at 11:40.

## 2022-09-12 ENCOUNTER — TELEPHONE (OUTPATIENT)
Dept: INTERNAL MEDICINE CLINIC | Facility: CLINIC | Age: 87
End: 2022-09-12

## 2022-09-12 LAB
IRON SATN MFR SERPL: 15 %
IRON SERPL-MCNC: 50 UG/DL
TIBC SERPL-MCNC: 338 UG/DL (ref 240–450)
TRANSFERRIN SERPL-MCNC: 227 MG/DL (ref 200–360)

## 2022-09-12 NOTE — TELEPHONE ENCOUNTER
I called Mr. Carlos Walden, Mr. Johanny Alba son and discussed the lab test results with him. I informed him that I ordered iron, PTH and urinalysis to workup the anemia and hypercalcemia.

## 2022-09-13 ENCOUNTER — LAB ENCOUNTER (OUTPATIENT)
Dept: LAB | Age: 87
End: 2022-09-13
Attending: INTERNAL MEDICINE
Payer: MEDICARE

## 2022-09-13 ENCOUNTER — TELEPHONE (OUTPATIENT)
Dept: INTERNAL MEDICINE CLINIC | Facility: CLINIC | Age: 87
End: 2022-09-13

## 2022-09-13 DIAGNOSIS — E83.52 HYPERCALCEMIA: ICD-10-CM

## 2022-09-13 DIAGNOSIS — D50.9 IRON DEFICIENCY ANEMIA, UNSPECIFIED IRON DEFICIENCY ANEMIA TYPE: Primary | ICD-10-CM

## 2022-09-13 LAB — PTH-INTACT SERPL-MCNC: 31.5 PG/ML (ref 18.5–88)

## 2022-09-13 PROCEDURE — 81015 MICROSCOPIC EXAM OF URINE: CPT | Performed by: INTERNAL MEDICINE

## 2022-09-13 PROCEDURE — 36415 COLL VENOUS BLD VENIPUNCTURE: CPT

## 2022-09-13 PROCEDURE — 83970 ASSAY OF PARATHORMONE: CPT

## 2022-09-13 PROCEDURE — 81001 URINALYSIS AUTO W/SCOPE: CPT | Performed by: INTERNAL MEDICINE

## 2022-09-13 RX ORDER — FERROUS SULFATE 325(65) MG
325 TABLET ORAL
Qty: 90 TABLET | Refills: 1 | Status: SHIPPED | OUTPATIENT
Start: 2022-09-13

## 2022-09-13 NOTE — TELEPHONE ENCOUNTER
I called Mr. Wing Hamlin, Mr. Jayme Hess son but received no answer. I left a voice message to inform him of the test results and that iron supplement prescription was sent to pharmacy.

## 2022-10-04 ENCOUNTER — NURSE ONLY (OUTPATIENT)
Dept: HEMATOLOGY/ONCOLOGY | Facility: HOSPITAL | Age: 87
End: 2022-10-04
Attending: INTERNAL MEDICINE
Payer: MEDICARE

## 2022-10-04 VITALS
RESPIRATION RATE: 18 BRPM | TEMPERATURE: 98 F | SYSTOLIC BLOOD PRESSURE: 152 MMHG | HEIGHT: 68 IN | BODY MASS INDEX: 26.22 KG/M2 | OXYGEN SATURATION: 98 % | DIASTOLIC BLOOD PRESSURE: 79 MMHG | HEART RATE: 82 BPM | WEIGHT: 173 LBS

## 2022-10-04 DIAGNOSIS — C61 PROSTATE CANCER (HCC): Primary | ICD-10-CM

## 2022-10-04 DIAGNOSIS — Z79.818 ENCOUNTER FOR MONITORING ANDROGEN DEPRIVATION THERAPY: ICD-10-CM

## 2022-10-04 LAB — PSA SERPL-MCNC: <0.01 NG/ML (ref ?–4)

## 2022-10-04 PROCEDURE — 99214 OFFICE O/P EST MOD 30 MIN: CPT | Performed by: INTERNAL MEDICINE

## 2022-10-04 PROCEDURE — 36415 COLL VENOUS BLD VENIPUNCTURE: CPT

## 2022-10-04 PROCEDURE — 84153 ASSAY OF PSA TOTAL: CPT

## 2022-10-04 PROCEDURE — 96402 CHEMO HORMON ANTINEOPL SQ/IM: CPT

## 2022-10-04 NOTE — PROGRESS NOTES
Trelstar given in Left upper outer gluteal muscle. Tolerated injection well. Site covered with a band-aide.     Discharged ambulatory with a future appointment made

## 2022-10-13 RX ORDER — TAMSULOSIN HYDROCHLORIDE 0.4 MG/1
CAPSULE ORAL
Qty: 90 CAPSULE | Refills: 1 | Status: SHIPPED | OUTPATIENT
Start: 2022-10-13

## 2022-10-13 RX ORDER — LOVASTATIN 20 MG/1
TABLET ORAL
Qty: 90 TABLET | Refills: 1 | Status: SHIPPED | OUTPATIENT
Start: 2022-10-13

## 2022-10-13 NOTE — TELEPHONE ENCOUNTER
Refill passed per [facility] protocol   Requested Prescriptions   Pending Prescriptions Disp Refills    TAMSULOSIN 0.4 MG Oral Cap [Pharmacy Med Name: TAMSULOSIN 0.4MG CAPSULES] 90 capsule 1     Sig: TAKE 1 CAPSULE(0.4 MG) BY MOUTH DAILY        Genitourinary Medications Passed - 10/12/2022  5:43 AM        Passed - Patient does not have pulmonary hypertension on problem list        Passed - In person appointment or virtual visit in the past 12 mos or appointment in next 3 mos       Recent Outpatient Visits              1 week ago Prostate cancer ([facility] [address]    [address] [place] [address]    Nurse Only    1 week ago Prostate cancer [facility])    [facility] Hematology Oncology Tio Cormier MD    Office Visit    1 month ago Syncope, unspecified syncope type    Tracy Pacheco MD    Office Visit    2 months ago Type 2 diabetes mellitus without complication, without long-term current use of insulin [facility])    Walter Nascimento MD    Office Visit    3 months ago Prostate cancer [facility])    [address] [place] [address]    Nurse Only     Future Appointments         Provider Department Appt Notes    In 3 weeks Nick Black MD [place] [facility], [facility], [facility] follow up in 3 mo. In 2 months [facility] Hematology Oncology LAB. PIV.CL  to see Dr Delfina Alejandra 1/3    In 2 months  [facility] [address]    In 2 months Tio Cormier MD [facility] AND [facility] Hematology Oncology FOLLOW UP VISIT. CL  labs done 12/30                  LOVASTATIN 20 MG Oral Tab [Pharmacy Med Name: LOVASTATIN 20MG TABLETS] 90 tablet 1     Sig: TAKE 1 TABLET BY MOUTH AT NIGHT        Cholesterol Medication Protocol Passed - 10/12/2022  5:43 AM        Passed - ALT in past 12 months        Passed - LDL in past 12 months        Passed - Last ALT < 80       Lab Results   Component Value Date    ALT 20 09/09/2022             Passed - Last LDL < 130     Lab Results   Component Value Date    LDL 74 02/12/2022               Passed - In person appointment or virtual visit in the past 12 mos or appointment in next 3 mos       Recent Outpatient Visits              1 week ago Prostate cancer ([address]    [address] - Infusion    Nurse Only    1 week ago Prostate cancer [facility])    [facility] Hematology Oncology Krys Hendrickson MD    Office Visit    1 month ago Syncope, unspecified syncope type    Jolene Novoa MD    Office Visit    2 months ago Type 2 diabetes mellitus without complication, without long-term current use of insulin [facility])    Gauri Brewer MD    Office Visit    3 months ago Prostate cancer [facility])    [address] [facility] [address]    Nurse Only     Future Appointments         Provider Department Appt Notes    In 3 weeks Ness Carvalho MD [facility], [facility], [facility] follow up in 3 mo. In 2 months [address] Hematology Oncology LAB. PIV.CL  to see Dr Cassie Gonzalez 1/3    In 2 months  [facility] [address]    In 2 months Krys Hendrickson MD [facility] AND [facility] Hematology Oncology FOLLOW UP VISIT. CL  labs done 12/30

## 2022-11-04 ENCOUNTER — OFFICE VISIT (OUTPATIENT)
Facility: CLINIC | Age: 87
End: 2022-11-04
Payer: MEDICARE

## 2022-11-04 VITALS
WEIGHT: 176 LBS | SYSTOLIC BLOOD PRESSURE: 136 MMHG | BODY MASS INDEX: 26.67 KG/M2 | DIASTOLIC BLOOD PRESSURE: 76 MMHG | HEIGHT: 68 IN | HEART RATE: 62 BPM

## 2022-11-04 DIAGNOSIS — I10 HYPERTENSION, BENIGN: ICD-10-CM

## 2022-11-04 DIAGNOSIS — M51.37 DEGENERATION OF LUMBAR OR LUMBOSACRAL INTERVERTEBRAL DISC: ICD-10-CM

## 2022-11-04 DIAGNOSIS — E11.9 TYPE 2 DIABETES MELLITUS WITHOUT COMPLICATION, WITHOUT LONG-TERM CURRENT USE OF INSULIN (HCC): Primary | ICD-10-CM

## 2022-11-04 DIAGNOSIS — E78.5 HYPERLIPIDEMIA, UNSPECIFIED HYPERLIPIDEMIA TYPE: ICD-10-CM

## 2022-11-04 PROCEDURE — 99214 OFFICE O/P EST MOD 30 MIN: CPT | Performed by: INTERNAL MEDICINE

## 2022-11-04 PROCEDURE — 90662 IIV NO PRSV INCREASED AG IM: CPT | Performed by: INTERNAL MEDICINE

## 2022-11-04 PROCEDURE — G0008 ADMIN INFLUENZA VIRUS VAC: HCPCS | Performed by: INTERNAL MEDICINE

## 2022-11-04 PROCEDURE — 3075F SYST BP GE 130 - 139MM HG: CPT | Performed by: INTERNAL MEDICINE

## 2022-11-04 PROCEDURE — 3008F BODY MASS INDEX DOCD: CPT | Performed by: INTERNAL MEDICINE

## 2022-11-04 PROCEDURE — 3078F DIAST BP <80 MM HG: CPT | Performed by: INTERNAL MEDICINE

## 2022-11-04 RX ORDER — METFORMIN HYDROCHLORIDE 500 MG/1
500 TABLET, EXTENDED RELEASE ORAL DAILY
Qty: 90 TABLET | Refills: 1 | Status: SHIPPED | OUTPATIENT
Start: 2022-11-04

## 2022-11-09 ENCOUNTER — LAB ENCOUNTER (OUTPATIENT)
Dept: LAB | Facility: HOSPITAL | Age: 87
End: 2022-11-09
Attending: INTERNAL MEDICINE
Payer: MEDICARE

## 2022-11-09 DIAGNOSIS — I10 HYPERTENSION, BENIGN: ICD-10-CM

## 2022-11-09 DIAGNOSIS — E11.9 TYPE 2 DIABETES MELLITUS WITHOUT COMPLICATION, WITHOUT LONG-TERM CURRENT USE OF INSULIN (HCC): ICD-10-CM

## 2022-11-09 LAB
CHOLEST SERPL-MCNC: 139 MG/DL (ref ?–200)
CREAT UR-SCNC: 218 MG/DL
EST. AVERAGE GLUCOSE BLD GHB EST-MCNC: 157 MG/DL (ref 68–126)
FASTING PATIENT LIPID ANSWER: YES
HBA1C MFR BLD: 7.1 % (ref ?–5.7)
HDLC SERPL-MCNC: 54 MG/DL (ref 40–59)
LDLC SERPL CALC-MCNC: 68 MG/DL (ref ?–100)
MICROALBUMIN UR-MCNC: 103 MG/DL
MICROALBUMIN/CREAT 24H UR-RTO: 472.5 UG/MG (ref ?–30)
NONHDLC SERPL-MCNC: 85 MG/DL (ref ?–130)
TRIGL SERPL-MCNC: 88 MG/DL (ref 30–149)
TSI SER-ACNC: 3.22 MIU/ML (ref 0.36–3.74)
VLDLC SERPL CALC-MCNC: 13 MG/DL (ref 0–30)

## 2022-11-09 PROCEDURE — 83036 HEMOGLOBIN GLYCOSYLATED A1C: CPT

## 2022-11-09 PROCEDURE — 82043 UR ALBUMIN QUANTITATIVE: CPT

## 2022-11-09 PROCEDURE — 82570 ASSAY OF URINE CREATININE: CPT

## 2022-11-09 PROCEDURE — 84443 ASSAY THYROID STIM HORMONE: CPT

## 2022-11-09 PROCEDURE — 36415 COLL VENOUS BLD VENIPUNCTURE: CPT

## 2022-11-09 PROCEDURE — 80061 LIPID PANEL: CPT

## 2022-11-12 ENCOUNTER — NURSE TRIAGE (OUTPATIENT)
Dept: INTERNAL MEDICINE CLINIC | Facility: CLINIC | Age: 87
End: 2022-11-12

## 2022-11-12 NOTE — TELEPHONE ENCOUNTER
Noted , with diabetes Recommend pt to go to IC   To be evaluated - might need antibiotic or other  Th. I&D ? Performed

## 2022-11-12 NOTE — TELEPHONE ENCOUNTER
Advised patient of Dr. René Reno note. Patient verbalized understanding. Stated to call his son Lina Marie. Advised son Sudheer(on hipaa) of Dr. René Reno note and also went over results from Sarwat Galindo. Son verbalized understanding and will have patient go to urgent care.

## 2022-11-15 ENCOUNTER — TELEPHONE (OUTPATIENT)
Facility: CLINIC | Age: 87
End: 2022-11-15

## 2022-11-15 ENCOUNTER — OFFICE VISIT (OUTPATIENT)
Facility: CLINIC | Age: 87
End: 2022-11-15
Payer: MEDICARE

## 2022-11-15 VITALS
SYSTOLIC BLOOD PRESSURE: 130 MMHG | DIASTOLIC BLOOD PRESSURE: 72 MMHG | HEIGHT: 68 IN | OXYGEN SATURATION: 95 % | RESPIRATION RATE: 14 BRPM | HEART RATE: 66 BPM | WEIGHT: 176 LBS | BODY MASS INDEX: 26.67 KG/M2

## 2022-11-15 DIAGNOSIS — L02.92 BOIL: Primary | ICD-10-CM

## 2022-11-15 PROCEDURE — 1126F AMNT PAIN NOTED NONE PRSNT: CPT | Performed by: INTERNAL MEDICINE

## 2022-11-15 PROCEDURE — 99213 OFFICE O/P EST LOW 20 MIN: CPT | Performed by: INTERNAL MEDICINE

## 2022-11-15 PROCEDURE — 3008F BODY MASS INDEX DOCD: CPT | Performed by: INTERNAL MEDICINE

## 2022-11-15 PROCEDURE — 3078F DIAST BP <80 MM HG: CPT | Performed by: INTERNAL MEDICINE

## 2022-11-15 PROCEDURE — 3075F SYST BP GE 130 - 139MM HG: CPT | Performed by: INTERNAL MEDICINE

## 2022-11-15 RX ORDER — SULFAMETHOXAZOLE AND TRIMETHOPRIM 800; 160 MG/1; MG/1
1 TABLET ORAL 2 TIMES DAILY
Qty: 14 TABLET | Refills: 0 | Status: SHIPPED | OUTPATIENT
Start: 2022-11-15 | End: 2022-11-22

## 2022-11-15 NOTE — TELEPHONE ENCOUNTER
Patient currently in the office requesting to be seen due to a boil he has on his shoulder. Son was told to take father to an UC but patient refusing to go. He wants to HIS pcp to see him

## 2022-12-06 ENCOUNTER — APPOINTMENT (OUTPATIENT)
Dept: GENERAL RADIOLOGY | Facility: HOSPITAL | Age: 87
End: 2022-12-06
Attending: EMERGENCY MEDICINE
Payer: MEDICARE

## 2022-12-06 ENCOUNTER — HOSPITAL ENCOUNTER (EMERGENCY)
Facility: HOSPITAL | Age: 87
Discharge: HOME | End: 2022-12-06
Attending: EMERGENCY MEDICINE
Payer: MEDICARE

## 2022-12-06 VITALS
OXYGEN SATURATION: 98 % | HEART RATE: 105 BPM | WEIGHT: 170 LBS | TEMPERATURE: 99 F | RESPIRATION RATE: 16 BRPM | SYSTOLIC BLOOD PRESSURE: 153 MMHG | HEIGHT: 68 IN | DIASTOLIC BLOOD PRESSURE: 93 MMHG | BODY MASS INDEX: 25.76 KG/M2

## 2022-12-06 DIAGNOSIS — R42 DIZZINESS: Primary | ICD-10-CM

## 2022-12-06 DIAGNOSIS — M19.032 ARTHRITIS OF LEFT WRIST: ICD-10-CM

## 2022-12-06 LAB
ANION GAP SERPL CALC-SCNC: 5 MMOL/L (ref 0–18)
BASOPHILS # BLD AUTO: 0.02 X10(3) UL (ref 0–0.2)
BASOPHILS NFR BLD AUTO: 0.3 %
BUN BLD-MCNC: 19 MG/DL (ref 7–18)
BUN/CREAT SERPL: 17 (ref 10–20)
CALCIUM BLD-MCNC: 10.5 MG/DL (ref 8.5–10.1)
CHLORIDE SERPL-SCNC: 105 MMOL/L (ref 98–112)
CO2 SERPL-SCNC: 30 MMOL/L (ref 21–32)
CREAT BLD-MCNC: 1.12 MG/DL
CRP SERPL-MCNC: 0.48 MG/DL (ref ?–0.3)
DEPRECATED RDW RBC AUTO: 46.3 FL (ref 35.1–46.3)
EOSINOPHIL # BLD AUTO: 0.04 X10(3) UL (ref 0–0.7)
EOSINOPHIL NFR BLD AUTO: 0.7 %
ERYTHROCYTE [DISTWIDTH] IN BLOOD BY AUTOMATED COUNT: 15.2 % (ref 11–15)
ERYTHROCYTE [SEDIMENTATION RATE] IN BLOOD: 60 MM/HR
FLUAV + FLUBV RNA SPEC NAA+PROBE: NEGATIVE
FLUAV + FLUBV RNA SPEC NAA+PROBE: NEGATIVE
GFR SERPLBLD BASED ON 1.73 SQ M-ARVRAT: 64 ML/MIN/1.73M2 (ref 60–?)
GLUCOSE BLD-MCNC: 134 MG/DL (ref 70–99)
HCT VFR BLD AUTO: 36.5 %
HGB BLD-MCNC: 11.5 G/DL
IMM GRANULOCYTES # BLD AUTO: 0.02 X10(3) UL (ref 0–1)
IMM GRANULOCYTES NFR BLD: 0.3 %
LYMPHOCYTES # BLD AUTO: 0.73 X10(3) UL (ref 1–4)
LYMPHOCYTES NFR BLD AUTO: 11.9 %
MCH RBC QN AUTO: 26.3 PG (ref 26–34)
MCHC RBC AUTO-ENTMCNC: 31.5 G/DL (ref 31–37)
MCV RBC AUTO: 83.5 FL
MONOCYTES # BLD AUTO: 0.53 X10(3) UL (ref 0.1–1)
MONOCYTES NFR BLD AUTO: 8.6 %
NEUTROPHILS # BLD AUTO: 4.8 X10 (3) UL (ref 1.5–7.7)
NEUTROPHILS # BLD AUTO: 4.8 X10(3) UL (ref 1.5–7.7)
NEUTROPHILS NFR BLD AUTO: 78.2 %
OSMOLALITY SERPL CALC.SUM OF ELEC: 294 MOSM/KG (ref 275–295)
PLATELET # BLD AUTO: 219 10(3)UL (ref 150–450)
POTASSIUM SERPL-SCNC: 4 MMOL/L (ref 3.5–5.1)
RBC # BLD AUTO: 4.37 X10(6)UL
RSV RNA SPEC NAA+PROBE: NEGATIVE
SARS-COV-2 RNA RESP QL NAA+PROBE: NOT DETECTED
SODIUM SERPL-SCNC: 140 MMOL/L (ref 136–145)
WBC # BLD AUTO: 6.1 X10(3) UL (ref 4–11)

## 2022-12-06 PROCEDURE — 99283 EMERGENCY DEPT VISIT LOW MDM: CPT

## 2022-12-06 PROCEDURE — 80048 BASIC METABOLIC PNL TOTAL CA: CPT

## 2022-12-06 PROCEDURE — 85025 COMPLETE CBC W/AUTO DIFF WBC: CPT | Performed by: EMERGENCY MEDICINE

## 2022-12-06 PROCEDURE — 0241U SARS-COV-2/FLU A AND B/RSV BY PCR (GENEXPERT): CPT | Performed by: EMERGENCY MEDICINE

## 2022-12-06 PROCEDURE — 85025 COMPLETE CBC W/AUTO DIFF WBC: CPT

## 2022-12-06 PROCEDURE — 36415 COLL VENOUS BLD VENIPUNCTURE: CPT

## 2022-12-06 PROCEDURE — 73110 X-RAY EXAM OF WRIST: CPT | Performed by: EMERGENCY MEDICINE

## 2022-12-06 PROCEDURE — 86140 C-REACTIVE PROTEIN: CPT | Performed by: EMERGENCY MEDICINE

## 2022-12-06 PROCEDURE — 85652 RBC SED RATE AUTOMATED: CPT | Performed by: EMERGENCY MEDICINE

## 2022-12-06 PROCEDURE — 80048 BASIC METABOLIC PNL TOTAL CA: CPT | Performed by: EMERGENCY MEDICINE

## 2022-12-06 NOTE — ED INITIAL ASSESSMENT (HPI)
Pt came in for increased sweating this week and fatigue this morning. Reports he \"felt funny, couldn't eat breakfast, and went back to bed\" this morning. RR even and nonlabored, speaking in full sentences, ambulatory with steady. Son reports pt has also had pain with wwakness in his left hand since yesterday. Reports tenderness to left hand/wrist. Denies injury, CMS intact.

## 2022-12-06 NOTE — DISCHARGE INSTRUCTIONS
Ace wrap and ice to wrist  Drink plenty of fluids  Return if increased pain fever worsening dizziness  Follow-up with your doctor and orthopedics  Tylenol and gabapentin for pain

## 2022-12-08 ENCOUNTER — OFFICE VISIT (OUTPATIENT)
Facility: CLINIC | Age: 87
End: 2022-12-08
Payer: MEDICARE

## 2022-12-08 VITALS
SYSTOLIC BLOOD PRESSURE: 122 MMHG | BODY MASS INDEX: 25.76 KG/M2 | OXYGEN SATURATION: 98 % | RESPIRATION RATE: 14 BRPM | HEIGHT: 68 IN | WEIGHT: 170 LBS | HEART RATE: 106 BPM | DIASTOLIC BLOOD PRESSURE: 60 MMHG

## 2022-12-08 DIAGNOSIS — M25.532 WRIST PAIN, LEFT: ICD-10-CM

## 2022-12-08 DIAGNOSIS — I10 HYPERTENSION, BENIGN: Primary | ICD-10-CM

## 2022-12-08 DIAGNOSIS — L02.91 ABSCESS: ICD-10-CM

## 2022-12-08 PROCEDURE — 3074F SYST BP LT 130 MM HG: CPT | Performed by: INTERNAL MEDICINE

## 2022-12-08 PROCEDURE — 3078F DIAST BP <80 MM HG: CPT | Performed by: INTERNAL MEDICINE

## 2022-12-08 PROCEDURE — 1126F AMNT PAIN NOTED NONE PRSNT: CPT | Performed by: INTERNAL MEDICINE

## 2022-12-08 PROCEDURE — 3008F BODY MASS INDEX DOCD: CPT | Performed by: INTERNAL MEDICINE

## 2022-12-08 PROCEDURE — 99214 OFFICE O/P EST MOD 30 MIN: CPT | Performed by: INTERNAL MEDICINE

## 2022-12-09 DIAGNOSIS — C61 PROSTATE CANCER (HCC): ICD-10-CM

## 2022-12-09 RX ORDER — ABIRATERONE ACETATE 250 MG/1
1000 TABLET ORAL DAILY
Qty: 120 TABLET | Refills: 11 | Status: SHIPPED | OUTPATIENT
Start: 2022-12-09 | End: 2023-01-08

## 2022-12-19 ENCOUNTER — TELEPHONE (OUTPATIENT)
Dept: HEMATOLOGY/ONCOLOGY | Facility: HOSPITAL | Age: 87
End: 2022-12-19

## 2022-12-19 NOTE — TELEPHONE ENCOUNTER
Returned call to pharmacy [facility]- abiraterone medication was discontinued by Dr. Lam Brink as noted in his 10/4/2022 note.

## 2022-12-19 NOTE — TELEPHONE ENCOUNTER
Juan Miguel Villegas is calling on behalf of João Snow has a medication question Zytiga. Please call to clarify patient is taking this medication.

## 2022-12-30 ENCOUNTER — NURSE ONLY (OUTPATIENT)
Dept: HEMATOLOGY/ONCOLOGY | Facility: HOSPITAL | Age: 87
End: 2022-12-30
Attending: INTERNAL MEDICINE
Payer: MEDICARE

## 2022-12-30 DIAGNOSIS — C61 PROSTATE CANCER (HCC): ICD-10-CM

## 2022-12-30 DIAGNOSIS — Z79.818 ENCOUNTER FOR MONITORING ANDROGEN DEPRIVATION THERAPY: ICD-10-CM

## 2022-12-30 LAB — PSA SERPL-MCNC: <0.01 NG/ML (ref ?–4)

## 2022-12-30 PROCEDURE — 84153 ASSAY OF PSA TOTAL: CPT

## 2022-12-30 PROCEDURE — 36415 COLL VENOUS BLD VENIPUNCTURE: CPT

## 2023-01-03 ENCOUNTER — OFFICE VISIT (OUTPATIENT)
Dept: HEMATOLOGY/ONCOLOGY | Facility: HOSPITAL | Age: 88
End: 2023-01-03
Attending: INTERNAL MEDICINE
Payer: MEDICARE

## 2023-01-03 VITALS
TEMPERATURE: 99 F | RESPIRATION RATE: 18 BRPM | WEIGHT: 174 LBS | HEIGHT: 68 IN | OXYGEN SATURATION: 98 % | DIASTOLIC BLOOD PRESSURE: 65 MMHG | HEART RATE: 96 BPM | BODY MASS INDEX: 26.37 KG/M2 | SYSTOLIC BLOOD PRESSURE: 106 MMHG

## 2023-01-03 DIAGNOSIS — Z79.818 ENCOUNTER FOR MONITORING ANDROGEN DEPRIVATION THERAPY: ICD-10-CM

## 2023-01-03 DIAGNOSIS — C61 PROSTATE CANCER (HCC): Primary | ICD-10-CM

## 2023-01-03 PROCEDURE — 96402 CHEMO HORMON ANTINEOPL SQ/IM: CPT

## 2023-01-03 PROCEDURE — 99214 OFFICE O/P EST MOD 30 MIN: CPT | Performed by: INTERNAL MEDICINE

## 2023-01-03 NOTE — PROGRESS NOTES
Trelstar given IM today in Left upper outer gluteal muscle. Tolerated injection well. Site covered with a band-aide.

## 2023-02-07 ENCOUNTER — OFFICE VISIT (OUTPATIENT)
Facility: CLINIC | Age: 88
End: 2023-02-07

## 2023-02-07 VITALS
WEIGHT: 170 LBS | HEIGHT: 68 IN | HEART RATE: 102 BPM | OXYGEN SATURATION: 98 % | BODY MASS INDEX: 25.76 KG/M2 | RESPIRATION RATE: 14 BRPM | SYSTOLIC BLOOD PRESSURE: 116 MMHG | DIASTOLIC BLOOD PRESSURE: 70 MMHG

## 2023-02-07 DIAGNOSIS — E11.9 TYPE 2 DIABETES MELLITUS WITHOUT COMPLICATION, WITHOUT LONG-TERM CURRENT USE OF INSULIN (HCC): ICD-10-CM

## 2023-02-07 DIAGNOSIS — E78.5 HYPERLIPIDEMIA, UNSPECIFIED HYPERLIPIDEMIA TYPE: Primary | ICD-10-CM

## 2023-02-07 DIAGNOSIS — N40.0 BENIGN PROSTATIC HYPERPLASIA WITHOUT LOWER URINARY TRACT SYMPTOMS: ICD-10-CM

## 2023-02-07 DIAGNOSIS — M96.1 POSTLAMINECTOMY SYNDROME, LUMBAR REGION: ICD-10-CM

## 2023-02-07 DIAGNOSIS — M51.37 DEGENERATION OF LUMBAR OR LUMBOSACRAL INTERVERTEBRAL DISC: ICD-10-CM

## 2023-02-07 DIAGNOSIS — C61 PROSTATE CANCER (HCC): ICD-10-CM

## 2023-02-07 DIAGNOSIS — I10 HYPERTENSION, BENIGN: ICD-10-CM

## 2023-02-07 DIAGNOSIS — G60.9 HEREDITARY AND IDIOPATHIC PERIPHERAL NEUROPATHY: ICD-10-CM

## 2023-02-07 PROCEDURE — 96160 PT-FOCUSED HLTH RISK ASSMT: CPT | Performed by: INTERNAL MEDICINE

## 2023-02-07 PROCEDURE — 3008F BODY MASS INDEX DOCD: CPT | Performed by: INTERNAL MEDICINE

## 2023-02-07 PROCEDURE — 3078F DIAST BP <80 MM HG: CPT | Performed by: INTERNAL MEDICINE

## 2023-02-07 PROCEDURE — 1125F AMNT PAIN NOTED PAIN PRSNT: CPT | Performed by: INTERNAL MEDICINE

## 2023-02-07 PROCEDURE — 99397 PER PM REEVAL EST PAT 65+ YR: CPT | Performed by: INTERNAL MEDICINE

## 2023-02-07 PROCEDURE — 3074F SYST BP LT 130 MM HG: CPT | Performed by: INTERNAL MEDICINE

## 2023-02-07 PROCEDURE — G0439 PPPS, SUBSEQ VISIT: HCPCS | Performed by: INTERNAL MEDICINE

## 2023-02-07 RX ORDER — METFORMIN HYDROCHLORIDE 500 MG/1
500 TABLET, EXTENDED RELEASE ORAL DAILY
Qty: 90 TABLET | Refills: 1 | Status: SHIPPED | OUTPATIENT
Start: 2023-02-07

## 2023-02-09 ENCOUNTER — MED REC SCAN ONLY (OUTPATIENT)
Facility: CLINIC | Age: 88
End: 2023-02-09

## 2023-02-11 ENCOUNTER — TELEPHONE (OUTPATIENT)
Facility: CLINIC | Age: 88
End: 2023-02-11

## 2023-02-15 ENCOUNTER — OFFICE VISIT (OUTPATIENT)
Dept: SURGERY | Facility: CLINIC | Age: 88
End: 2023-02-15
Payer: MEDICARE

## 2023-02-15 VITALS — SYSTOLIC BLOOD PRESSURE: 116 MMHG | DIASTOLIC BLOOD PRESSURE: 74 MMHG | HEART RATE: 105 BPM

## 2023-02-15 DIAGNOSIS — R82.90 URINE FINDING: Primary | ICD-10-CM

## 2023-02-15 DIAGNOSIS — R39.15 URGENCY OF URINATION: ICD-10-CM

## 2023-02-15 DIAGNOSIS — C61 PROSTATE CANCER (HCC): ICD-10-CM

## 2023-02-15 DIAGNOSIS — R35.1 NOCTURIA: ICD-10-CM

## 2023-02-15 LAB
APPEARANCE: CLEAR
BILIRUB UR QL: NEGATIVE
BILIRUBIN: NEGATIVE
CLARITY UR: CLEAR
COLOR UR: YELLOW
GLUCOSE (URINE DIPSTICK): NEGATIVE MG/DL
GLUCOSE UR-MCNC: NEGATIVE MG/DL
HYALINE CASTS #/AREA URNS AUTO: PRESENT /LPF
KETONES (URINE DIPSTICK): NEGATIVE MG/DL
KETONES UR-MCNC: NEGATIVE MG/DL
LEUKOCYTE ESTERASE UR QL STRIP.AUTO: NEGATIVE
LEUKOCYTES: NEGATIVE
MULTISTIX LOT#: ABNORMAL NUMERIC
NITRITE UR QL STRIP.AUTO: NEGATIVE
NITRITE, URINE: NEGATIVE
PH UR: 6 [PH] (ref 5–8)
PH, URINE: 6 (ref 4.5–8)
PROT UR-MCNC: 100 MG/DL
PROTEIN (URINE DIPSTICK): 100 MG/DL
SP GR UR STRIP: 1.01 (ref 1–1.03)
SPECIFIC GRAVITY: 1.01 (ref 1–1.03)
URINE-COLOR: YELLOW
UROBILINOGEN UR STRIP-ACNC: <2
UROBILINOGEN,SEMI-QN: 0.2 MG/DL (ref 0–1.9)
VIT C UR-MCNC: NEGATIVE MG/DL

## 2023-02-15 PROCEDURE — 3074F SYST BP LT 130 MM HG: CPT | Performed by: UROLOGY

## 2023-02-15 PROCEDURE — 99215 OFFICE O/P EST HI 40 MIN: CPT | Performed by: UROLOGY

## 2023-02-15 PROCEDURE — 81002 URINALYSIS NONAUTO W/O SCOPE: CPT | Performed by: UROLOGY

## 2023-02-15 PROCEDURE — 3078F DIAST BP <80 MM HG: CPT | Performed by: UROLOGY

## 2023-02-28 ENCOUNTER — TELEPHONE (OUTPATIENT)
Dept: SURGERY | Facility: CLINIC | Age: 88
End: 2023-02-28

## 2023-02-28 NOTE — TELEPHONE ENCOUNTER
I called pt son, Vianey Preston into an exam room (verified through LEXIE) confirmed pt name/ son states pt went to pharmacy and did not pick medication, Mirabegron  because of the cost so today  I called pt pharmacy, spoke with pharmacist, Giuliana Rodríguez asking if 2/15/23 mirabegron 50mg was filled, Giuliana Rodríguez states does not need prior auth, pt insurance is covering $300.00 pt just have a high copay and has to pay 200.00. pt is unable to pay. Dr. Saadia Whitaker please advise.

## 2023-03-02 NOTE — TELEPHONE ENCOUNTER
I called pt son, Casey Lyman (verified through LEXIE) verified name/ of pt informed about Dr. Meng Zurita message below regarding medication informing son pt has high copay (through insurance) son states he will call pt insurance for more information on that, pt son Bonnie Colin refuses having pt try medication, Detrol due to side effects. Son states pt will  mirabegron at pharmacy and take daily.  Pharmacy has med ready for pt to  I called and spoke with Gio Briones at [facility] in [facility] ([facility]).

## 2023-03-31 DIAGNOSIS — C61 PROSTATE CANCER (HCC): Primary | ICD-10-CM

## 2023-03-31 DIAGNOSIS — Z79.818 ENCOUNTER FOR MONITORING ANDROGEN DEPRIVATION THERAPY: ICD-10-CM

## 2023-04-04 ENCOUNTER — APPOINTMENT (OUTPATIENT)
Dept: HEMATOLOGY/ONCOLOGY | Facility: HOSPITAL | Age: 88
End: 2023-04-04
Attending: INTERNAL MEDICINE
Payer: MEDICARE

## 2023-04-04 ENCOUNTER — TELEPHONE (OUTPATIENT)
Dept: HEMATOLOGY/ONCOLOGY | Facility: HOSPITAL | Age: 88
End: 2023-04-04

## 2023-04-04 NOTE — TELEPHONE ENCOUNTER
Called and unable to reach patient. Unable to leave VM on home phone due to voicemail box being full. Left VM on patient's cell phone telling him he has several appointments today and missed his lab and doctor visit appointments this morning. Requesting call back. Provided office phone number.

## 2023-04-06 ENCOUNTER — OFFICE VISIT (OUTPATIENT)
Dept: INTERNAL MEDICINE CLINIC | Facility: CLINIC | Age: 88
End: 2023-04-06

## 2023-04-06 ENCOUNTER — LAB ENCOUNTER (OUTPATIENT)
Dept: LAB | Facility: HOSPITAL | Age: 88
End: 2023-04-06
Attending: NURSE PRACTITIONER
Payer: MEDICARE

## 2023-04-06 VITALS
HEIGHT: 69 IN | DIASTOLIC BLOOD PRESSURE: 72 MMHG | SYSTOLIC BLOOD PRESSURE: 128 MMHG | RESPIRATION RATE: 16 BRPM | HEART RATE: 85 BPM | WEIGHT: 173 LBS | BODY MASS INDEX: 25.62 KG/M2

## 2023-04-06 DIAGNOSIS — F80.81 STUTTERING: ICD-10-CM

## 2023-04-06 DIAGNOSIS — C61 PROSTATE CANCER (HCC): ICD-10-CM

## 2023-04-06 DIAGNOSIS — R53.1 WEAKNESS: ICD-10-CM

## 2023-04-06 DIAGNOSIS — R53.1 WEAKNESS: Primary | ICD-10-CM

## 2023-04-06 DIAGNOSIS — Z79.818 ENCOUNTER FOR MONITORING ANDROGEN DEPRIVATION THERAPY: ICD-10-CM

## 2023-04-06 LAB
ALBUMIN SERPL-MCNC: 3.4 G/DL (ref 3.4–5)
ALBUMIN/GLOB SERPL: 0.7 {RATIO} (ref 1–2)
ALP LIVER SERPL-CCNC: 79 U/L
ALT SERPL-CCNC: 16 U/L
ANION GAP SERPL CALC-SCNC: 4 MMOL/L (ref 0–18)
AST SERPL-CCNC: 11 U/L (ref 15–37)
BASOPHILS # BLD AUTO: 0.02 X10(3) UL (ref 0–0.2)
BASOPHILS NFR BLD AUTO: 0.5 %
BILIRUB SERPL-MCNC: 0.2 MG/DL (ref 0.1–2)
BUN BLD-MCNC: 21 MG/DL (ref 7–18)
BUN/CREAT SERPL: 18.6 (ref 10–20)
CALCIUM BLD-MCNC: 9.8 MG/DL (ref 8.5–10.1)
CHLORIDE SERPL-SCNC: 107 MMOL/L (ref 98–112)
CO2 SERPL-SCNC: 31 MMOL/L (ref 21–32)
CREAT BLD-MCNC: 1.13 MG/DL
DEPRECATED RDW RBC AUTO: 46 FL (ref 35.1–46.3)
EOSINOPHIL # BLD AUTO: 0.1 X10(3) UL (ref 0–0.7)
EOSINOPHIL NFR BLD AUTO: 2.5 %
ERYTHROCYTE [DISTWIDTH] IN BLOOD BY AUTOMATED COUNT: 15.5 % (ref 11–15)
EST. AVERAGE GLUCOSE BLD GHB EST-MCNC: 157 MG/DL (ref 68–126)
FASTING STATUS PATIENT QL REPORTED: NO
GFR SERPLBLD BASED ON 1.73 SQ M-ARVRAT: 63 ML/MIN/1.73M2 (ref 60–?)
GLOBULIN PLAS-MCNC: 4.7 G/DL (ref 2.8–4.4)
GLUCOSE BLD-MCNC: 125 MG/DL (ref 70–99)
HBA1C MFR BLD: 7.1 % (ref ?–5.7)
HCT VFR BLD AUTO: 31.3 %
HGB BLD-MCNC: 9.7 G/DL
IMM GRANULOCYTES # BLD AUTO: 0.01 X10(3) UL (ref 0–1)
IMM GRANULOCYTES NFR BLD: 0.3 %
LYMPHOCYTES # BLD AUTO: 1.14 X10(3) UL (ref 1–4)
LYMPHOCYTES NFR BLD AUTO: 28.9 %
MCH RBC QN AUTO: 25.4 PG (ref 26–34)
MCHC RBC AUTO-ENTMCNC: 31 G/DL (ref 31–37)
MCV RBC AUTO: 81.9 FL
MONOCYTES # BLD AUTO: 0.55 X10(3) UL (ref 0.1–1)
MONOCYTES NFR BLD AUTO: 14 %
NEUTROPHILS # BLD AUTO: 2.12 X10 (3) UL (ref 1.5–7.7)
NEUTROPHILS # BLD AUTO: 2.12 X10(3) UL (ref 1.5–7.7)
NEUTROPHILS NFR BLD AUTO: 53.8 %
OSMOLALITY SERPL CALC.SUM OF ELEC: 298 MOSM/KG (ref 275–295)
PLATELET # BLD AUTO: 265 10(3)UL (ref 150–450)
POTASSIUM SERPL-SCNC: 3.6 MMOL/L (ref 3.5–5.1)
PROT SERPL-MCNC: 8.1 G/DL (ref 6.4–8.2)
PSA SERPL-MCNC: <0.01 NG/ML (ref ?–4)
RBC # BLD AUTO: 3.82 X10(6)UL
SODIUM SERPL-SCNC: 142 MMOL/L (ref 136–145)
WBC # BLD AUTO: 3.9 X10(3) UL (ref 4–11)

## 2023-04-06 PROCEDURE — 83036 HEMOGLOBIN GLYCOSYLATED A1C: CPT

## 2023-04-06 PROCEDURE — 85025 COMPLETE CBC W/AUTO DIFF WBC: CPT

## 2023-04-06 PROCEDURE — 3008F BODY MASS INDEX DOCD: CPT | Performed by: NURSE PRACTITIONER

## 2023-04-06 PROCEDURE — 3074F SYST BP LT 130 MM HG: CPT | Performed by: NURSE PRACTITIONER

## 2023-04-06 PROCEDURE — 99214 OFFICE O/P EST MOD 30 MIN: CPT | Performed by: NURSE PRACTITIONER

## 2023-04-06 PROCEDURE — 3078F DIAST BP <80 MM HG: CPT | Performed by: NURSE PRACTITIONER

## 2023-04-06 PROCEDURE — 84153 ASSAY OF PSA TOTAL: CPT

## 2023-04-06 PROCEDURE — 36415 COLL VENOUS BLD VENIPUNCTURE: CPT

## 2023-04-06 PROCEDURE — 80053 COMPREHEN METABOLIC PANEL: CPT

## 2023-04-13 ENCOUNTER — HOSPITAL ENCOUNTER (OUTPATIENT)
Dept: CT IMAGING | Facility: HOSPITAL | Age: 88
Discharge: HOME | End: 2023-04-13
Attending: NURSE PRACTITIONER
Payer: MEDICARE

## 2023-04-13 DIAGNOSIS — R53.1 WEAKNESS: ICD-10-CM

## 2023-04-13 DIAGNOSIS — F80.81 STUTTERING: ICD-10-CM

## 2023-04-13 PROCEDURE — 70450 CT HEAD/BRAIN W/O DYE: CPT | Performed by: NURSE PRACTITIONER

## 2023-04-19 DIAGNOSIS — Z79.818 ENCOUNTER FOR MONITORING ANDROGEN DEPRIVATION THERAPY: ICD-10-CM

## 2023-04-19 DIAGNOSIS — C61 PROSTATE CANCER (HCC): Primary | ICD-10-CM

## 2023-04-21 ENCOUNTER — OFFICE VISIT (OUTPATIENT)
Dept: HEMATOLOGY/ONCOLOGY | Facility: HOSPITAL | Age: 88
End: 2023-04-21
Attending: INTERNAL MEDICINE
Payer: MEDICARE

## 2023-04-21 VITALS
OXYGEN SATURATION: 98 % | SYSTOLIC BLOOD PRESSURE: 123 MMHG | BODY MASS INDEX: 25.46 KG/M2 | HEIGHT: 68 IN | TEMPERATURE: 98 F | DIASTOLIC BLOOD PRESSURE: 63 MMHG | RESPIRATION RATE: 18 BRPM | WEIGHT: 168 LBS | HEART RATE: 75 BPM

## 2023-04-21 DIAGNOSIS — Z79.818 ENCOUNTER FOR MONITORING ANDROGEN DEPRIVATION THERAPY: ICD-10-CM

## 2023-04-21 DIAGNOSIS — C61 PROSTATE CANCER (HCC): ICD-10-CM

## 2023-04-21 DIAGNOSIS — C61 PROSTATE CANCER (HCC): Primary | ICD-10-CM

## 2023-04-21 LAB — PSA SERPL-MCNC: <0.01 NG/ML (ref ?–4)

## 2023-04-21 PROCEDURE — 96402 CHEMO HORMON ANTINEOPL SQ/IM: CPT

## 2023-04-21 PROCEDURE — 84153 ASSAY OF PSA TOTAL: CPT

## 2023-04-21 PROCEDURE — 36415 COLL VENOUS BLD VENIPUNCTURE: CPT

## 2023-04-21 PROCEDURE — 99214 OFFICE O/P EST MOD 30 MIN: CPT | Performed by: INTERNAL MEDICINE

## 2023-04-21 NOTE — PROGRESS NOTES
Patient here after visit with Dr Javon Johnson for Trelstar injection. Trelstar given in Right upper outer gluteal muscle. Tolerated injection well. Site covered with a band-aide. Next injection scheduled on Tuesday 7/18. . Dr Javon Johnson out of town starting 7/20. AVS printed with future appointment made.

## 2023-05-09 ENCOUNTER — OFFICE VISIT (OUTPATIENT)
Facility: CLINIC | Age: 88
End: 2023-05-09

## 2023-05-09 ENCOUNTER — LAB ENCOUNTER (OUTPATIENT)
Dept: LAB | Facility: HOSPITAL | Age: 88
End: 2023-05-09
Attending: NURSE PRACTITIONER
Payer: MEDICARE

## 2023-05-09 VITALS
DIASTOLIC BLOOD PRESSURE: 68 MMHG | WEIGHT: 170 LBS | RESPIRATION RATE: 14 BRPM | OXYGEN SATURATION: 97 % | BODY MASS INDEX: 25.76 KG/M2 | SYSTOLIC BLOOD PRESSURE: 120 MMHG | HEART RATE: 72 BPM | HEIGHT: 68 IN

## 2023-05-09 DIAGNOSIS — D64.9 ANEMIA, UNSPECIFIED TYPE: ICD-10-CM

## 2023-05-09 DIAGNOSIS — I10 HYPERTENSION, BENIGN: ICD-10-CM

## 2023-05-09 DIAGNOSIS — E78.5 HYPERLIPIDEMIA, UNSPECIFIED HYPERLIPIDEMIA TYPE: Primary | ICD-10-CM

## 2023-05-09 DIAGNOSIS — N40.0 BENIGN PROSTATIC HYPERPLASIA WITHOUT LOWER URINARY TRACT SYMPTOMS: ICD-10-CM

## 2023-05-09 DIAGNOSIS — F03.90 DEMENTIA WITHOUT BEHAVIORAL DISTURBANCE, PSYCHOTIC DISTURBANCE, MOOD DISTURBANCE, OR ANXIETY, UNSPECIFIED DEMENTIA SEVERITY, UNSPECIFIED DEMENTIA TYPE (HCC): ICD-10-CM

## 2023-05-09 LAB
DEPRECATED RDW RBC AUTO: 49.4 FL (ref 35.1–46.3)
ERYTHROCYTE [DISTWIDTH] IN BLOOD BY AUTOMATED COUNT: 16.6 % (ref 11–15)
HCT VFR BLD AUTO: 34.8 %
HGB BLD-MCNC: 10.5 G/DL
MCH RBC QN AUTO: 24.8 PG (ref 26–34)
MCHC RBC AUTO-ENTMCNC: 30.2 G/DL (ref 31–37)
MCV RBC AUTO: 82.1 FL
PLATELET # BLD AUTO: 251 10(3)UL (ref 150–450)
RBC # BLD AUTO: 4.24 X10(6)UL
WBC # BLD AUTO: 4.3 X10(3) UL (ref 4–11)

## 2023-05-09 PROCEDURE — 99214 OFFICE O/P EST MOD 30 MIN: CPT | Performed by: INTERNAL MEDICINE

## 2023-05-09 PROCEDURE — 3074F SYST BP LT 130 MM HG: CPT | Performed by: INTERNAL MEDICINE

## 2023-05-09 PROCEDURE — 3078F DIAST BP <80 MM HG: CPT | Performed by: INTERNAL MEDICINE

## 2023-05-09 PROCEDURE — 3008F BODY MASS INDEX DOCD: CPT | Performed by: INTERNAL MEDICINE

## 2023-05-09 PROCEDURE — 1159F MED LIST DOCD IN RCRD: CPT | Performed by: INTERNAL MEDICINE

## 2023-05-09 PROCEDURE — 85027 COMPLETE CBC AUTOMATED: CPT

## 2023-05-09 PROCEDURE — 1126F AMNT PAIN NOTED NONE PRSNT: CPT | Performed by: INTERNAL MEDICINE

## 2023-05-09 PROCEDURE — 36415 COLL VENOUS BLD VENIPUNCTURE: CPT

## 2023-05-15 ENCOUNTER — OFFICE VISIT (OUTPATIENT)
Dept: SURGERY | Facility: CLINIC | Age: 88
End: 2023-05-15

## 2023-05-15 DIAGNOSIS — R39.15 URGENCY OF URINATION: Primary | ICD-10-CM

## 2023-05-15 PROCEDURE — 99213 OFFICE O/P EST LOW 20 MIN: CPT | Performed by: UROLOGY

## 2023-05-15 PROCEDURE — 1159F MED LIST DOCD IN RCRD: CPT | Performed by: UROLOGY

## 2023-05-15 PROCEDURE — 1160F RVW MEDS BY RX/DR IN RCRD: CPT | Performed by: UROLOGY

## 2023-07-05 ENCOUNTER — APPOINTMENT (OUTPATIENT)
Dept: HEMATOLOGY/ONCOLOGY | Facility: HOSPITAL | Age: 88
End: 2023-07-05
Attending: INTERNAL MEDICINE
Payer: MEDICARE

## 2023-07-17 DIAGNOSIS — Z79.818 ENCOUNTER FOR MONITORING ANDROGEN DEPRIVATION THERAPY: ICD-10-CM

## 2023-07-17 DIAGNOSIS — C61 PROSTATE CANCER (HCC): Primary | ICD-10-CM

## 2023-07-18 ENCOUNTER — NURSE ONLY (OUTPATIENT)
Dept: HEMATOLOGY/ONCOLOGY | Facility: HOSPITAL | Age: 88
End: 2023-07-18
Attending: INTERNAL MEDICINE
Payer: MEDICARE

## 2023-07-18 VITALS
TEMPERATURE: 98 F | OXYGEN SATURATION: 96 % | WEIGHT: 169.63 LBS | BODY MASS INDEX: 25.71 KG/M2 | DIASTOLIC BLOOD PRESSURE: 69 MMHG | RESPIRATION RATE: 18 BRPM | HEART RATE: 76 BPM | SYSTOLIC BLOOD PRESSURE: 148 MMHG | HEIGHT: 68 IN

## 2023-07-18 DIAGNOSIS — C61 PROSTATE CANCER (HCC): Primary | ICD-10-CM

## 2023-07-18 DIAGNOSIS — Z79.818 ENCOUNTER FOR MONITORING ANDROGEN DEPRIVATION THERAPY: ICD-10-CM

## 2023-07-18 DIAGNOSIS — C61 PROSTATE CANCER (HCC): ICD-10-CM

## 2023-07-18 LAB — PSA SERPL-MCNC: <0.01 NG/ML (ref ?–4)

## 2023-07-18 PROCEDURE — 36415 COLL VENOUS BLD VENIPUNCTURE: CPT

## 2023-07-18 PROCEDURE — 99214 OFFICE O/P EST MOD 30 MIN: CPT | Performed by: INTERNAL MEDICINE

## 2023-07-18 PROCEDURE — 96402 CHEMO HORMON ANTINEOPL SQ/IM: CPT

## 2023-07-18 PROCEDURE — 84153 ASSAY OF PSA TOTAL: CPT

## 2023-08-10 ENCOUNTER — TELEPHONE (OUTPATIENT)
Dept: INTERNAL MEDICINE CLINIC | Facility: CLINIC | Age: 88
End: 2023-08-10

## 2023-08-10 ENCOUNTER — OFFICE VISIT (OUTPATIENT)
Facility: CLINIC | Age: 88
End: 2023-08-10

## 2023-08-10 VITALS
SYSTOLIC BLOOD PRESSURE: 130 MMHG | BODY MASS INDEX: 25.76 KG/M2 | RESPIRATION RATE: 14 BRPM | OXYGEN SATURATION: 97 % | DIASTOLIC BLOOD PRESSURE: 70 MMHG | WEIGHT: 170 LBS | HEART RATE: 78 BPM | HEIGHT: 68 IN

## 2023-08-10 DIAGNOSIS — E11.9 TYPE 2 DIABETES MELLITUS WITHOUT COMPLICATION, WITHOUT LONG-TERM CURRENT USE OF INSULIN (HCC): Primary | ICD-10-CM

## 2023-08-10 DIAGNOSIS — G47.00 INSOMNIA, UNSPECIFIED TYPE: ICD-10-CM

## 2023-08-10 DIAGNOSIS — R14.0 ABDOMINAL BLOATING: ICD-10-CM

## 2023-08-10 DIAGNOSIS — E78.5 HYPERLIPIDEMIA, UNSPECIFIED HYPERLIPIDEMIA TYPE: ICD-10-CM

## 2023-08-10 DIAGNOSIS — I10 HYPERTENSION, BENIGN: ICD-10-CM

## 2023-08-10 PROCEDURE — 1159F MED LIST DOCD IN RCRD: CPT | Performed by: INTERNAL MEDICINE

## 2023-08-10 PROCEDURE — 1125F AMNT PAIN NOTED PAIN PRSNT: CPT | Performed by: INTERNAL MEDICINE

## 2023-08-10 PROCEDURE — 3078F DIAST BP <80 MM HG: CPT | Performed by: INTERNAL MEDICINE

## 2023-08-10 PROCEDURE — 3075F SYST BP GE 130 - 139MM HG: CPT | Performed by: INTERNAL MEDICINE

## 2023-08-10 PROCEDURE — 3008F BODY MASS INDEX DOCD: CPT | Performed by: INTERNAL MEDICINE

## 2023-08-10 PROCEDURE — 99214 OFFICE O/P EST MOD 30 MIN: CPT | Performed by: INTERNAL MEDICINE

## 2023-08-10 RX ORDER — ZOLPIDEM TARTRATE 5 MG/1
5 TABLET ORAL NIGHTLY PRN
Qty: 30 TABLET | Refills: 5 | Status: SHIPPED | OUTPATIENT
Start: 2023-08-10 | End: 2024-02-06

## 2023-08-10 RX ORDER — PANTOPRAZOLE SODIUM 40 MG/1
40 TABLET, DELAYED RELEASE ORAL
Qty: 30 TABLET | Refills: 0 | Status: SHIPPED | OUTPATIENT
Start: 2023-08-10 | End: 2023-09-09

## 2023-08-10 RX ORDER — LOVASTATIN 20 MG/1
20 TABLET ORAL NIGHTLY
Qty: 90 TABLET | Refills: 1 | Status: SHIPPED | OUTPATIENT
Start: 2023-08-10

## 2023-08-10 RX ORDER — METFORMIN HYDROCHLORIDE 500 MG/1
500 TABLET, EXTENDED RELEASE ORAL DAILY
Qty: 90 TABLET | Refills: 1 | Status: SHIPPED | OUTPATIENT
Start: 2023-08-10

## 2023-08-10 RX ORDER — PANTOPRAZOLE SODIUM 40 MG/1
40 TABLET, DELAYED RELEASE ORAL
Refills: 0 | OUTPATIENT
Start: 2023-08-10

## 2023-08-10 NOTE — TELEPHONE ENCOUNTER
Current Outpatient Medications:     pantoprazole 40 MG Oral Tab EC, Take 1 tablet (40 mg total) by mouth every morning before breakfast., Disp: 30 tablet, Rfl: 0

## 2023-09-25 RX ORDER — AMLODIPINE BESYLATE 5 MG/1
5 TABLET ORAL DAILY
Qty: 90 TABLET | Refills: 3 | Status: SHIPPED | OUTPATIENT
Start: 2023-09-25

## 2023-09-25 NOTE — TELEPHONE ENCOUNTER
Current Outpatient Medications:       AMLODIPINE 5 MG Oral Tab, TAKE 1 TABLET BY MOUTH DAILY, Disp: 90 tablet, Rfl: 3

## 2023-09-26 NOTE — TELEPHONE ENCOUNTER
Refill passed per [facility] [facility] protocol. Requested Prescriptions   Pending Prescriptions Disp Refills    amLODIPine 5 MG Oral Tab 90 tablet 3     Sig: Take 1 tablet (5 mg total) by mouth daily.        Hypertensive Medications Protocol Passed - 9/25/2023  1:36 PM        Passed - In person appointment in the past 12 or next 3 months     Recent Outpatient Visits              1 month ago Type 2 diabetes mellitus without complication, without long-term current use of insulin ([facility].)    [address] [place],[address], [address] [facility], Lissa Slaughter MD    Office Visit    2 months ago Prostate cancer [facility])    [facility] Hematology Oncology Luis Eduardo Mccrary MD    Office Visit    2 months ago [facility])    Risa Osei - Infusion    Nurse Only    2 months ago [facility])    [facility] Hematology Oncology    Nurse Only    4 months ago Urgency of urination    [facility], [address] [place] [address],[address], Salima Foreman MD    Office Visit          Future Appointments         Provider Department Appt Notes    In 1 month SERJIO [email or web address]    In 1 month Raymond Houador 19 Hematology Oncology 4 m f/u.md    In 3 months Malia Garsia MD [facility], [address] 5 month follow up and medicare physical                    Passed - Last BP reading less than 140/90     BP Readings from Last 1 Encounters:  08/10/23 : 130/70              Passed - CMP or BMP in past 6 months     Recent Results (from the past 4392 hour(s))   Comp Metabolic Panel (14)    Collection Time: 04/06/23  5:33 PM   Result Value Ref Range    Glucose 125 (H) 70 - 99 mg/dL    Sodium 142 136 - 145 mmol/L    Potassium 3.6 3.5 - 5.1 mmol/L    Chloride 107 98 - 112 mmol/L    CO2 31.0 21.0 - 32.0 mmol/L    Anion Gap 4 0 - 18 mmol/L    BUN 21 (H) 7 - 18 mg/dL    Creatinine 1.13 0.70 - 1.30 mg/dL BUN/CREA Ratio 18.6 10.0 - 20.0    Calcium, Total 9.8 8.5 - 10.1 mg/dL    Calculated Osmolality 298 (H) 275 - 295 mOsm/kg    eGFR-Cr 63 >=60 mL/min/1.73m2    ALT 16 16 - 61 U/L    AST 11 (L) 15 - 37 U/L    Alkaline Phosphatase 79 45 - 117 U/L    Bilirubin, Total 0.2 0.1 - 2.0 mg/dL    Total Protein 8.1 6.4 - 8.2 g/dL    Albumin 3.4 3.4 - 5.0 g/dL    Globulin  4.7 (H) 2.8 - 4.4 g/dL    A/G Ratio 0.7 (L) 1.0 - 2.0    Patient Fasting for CMP? No      *Note: Due to a large number of results and/or encounters for the requested time period, some results have not been displayed. A complete set of results can be found in Results Review.                Passed - In person appointment or virtual visit in the past 6 months     Recent Outpatient Visits              1 month ago Type 2 diabetes mellitus without complication, without long-term current use of insulin ([facility]    [address], 602 [facility], Gauri Burnett MD    Office Visit    2 months ago Prostate cancer [facility])    [facility] Hematology Oncology Krys Hendrickson MD    Office Visit    2 months ago Prostate cancer [facility])    [facility] - Infusion    Nurse Only    2 months ago Prostate cancer [facility])    [facility] Hematology Oncology    Nurse Only    4 months ago Urgency of urination    Harjit Baeza MD    Office Visit          Future Appointments         Provider Department Appt Notes    In 1 month SERJIO DOMINGUEZ-PSA-    In 1 month Raymond Reich 19 Hematology Oncology 4 m f/u.md    In 3 months Ness Carvalho MD [address], [place] [address] [facility] 5 month follow up and medicare physical                    Passed - EGFRCR or GFRAA > 50     GFR Evaluation  EGFRCR: 63 , resulted on 4/6/2023                Future Appointments         Provider Department Appt Notes    In 1 month SERJIO Holbrook FT-PSA-PIV.md    [facility] 1 month [facility] 4 m f/u.md    In 3 months Melvin Wiseman MD [facility], [address] [place] [address] 5 month follow up and medicare physical             Recent Outpatient Visits              1 month ago Type 2 diabetes mellitus without complication, without long-term current use of insulin [facility])    [address], 602 [facility], Petty Melissa MD    Office Visit    2 months ago Prostate cancer [facility])    [facility] Hematology Oncology Margarita Santa MD    Office Visit    2 months ago Prostate cancer [facility])    [address] [facility] 15    Nurse Only    2 months ago Prostate cancer [facility])    [facility] Hematology Oncology    Nurse Only    4 months ago Urgency of urination    [facility], Fawad Cody MD    Office Visit

## 2023-09-27 RX ORDER — PANTOPRAZOLE SODIUM 40 MG/1
40 TABLET, DELAYED RELEASE ORAL
Qty: 90 TABLET | Refills: 3 | Status: SHIPPED | OUTPATIENT
Start: 2023-09-27

## 2023-09-27 NOTE — TELEPHONE ENCOUNTER
Refill passed per [place] [facility] protocol.     Requested Prescriptions   Pending Prescriptions Disp Refills    PANTOPRAZOLE 40 MG Oral Tab EC [Pharmacy Med Name: PANTOPRAZOLE 40MG TABLETS] 90 tablet 0     Sig: TAKE 1 TABLET(40 MG) BY MOUTH EVERY MORNING BEFORE BREAKFAST       Gastrointestional Medication Protocol Passed - 9/27/2023  5:41 AM        Passed - In person appointment or virtual visit in the past 12 mos or appointment in next 3 mos     Recent Outpatient Visits              1 month ago Type 2 diabetes mellitus without complication, without long-term current use of insulin ([facility] 75.)    [address], 602 [facility], Ketan Peterson MD    Office Visit    2 months ago Prostate cancer [facility])    [facility] Hematology Oncology Claudell Byes, MD    Office Visit    2 months ago Prostate cancer [facility])    [address] - Infusion    Nurse Only    2 months ago Prostate cancer [facility])    [facility] Hematology Oncology    Nurse Only    4 months ago Urgency of urination    [facility], [address], Marino Parada MD    Office Visit          Future Appointments         Provider Department Appt Notes    In 1 month SERJIO DOMINGUEZ-PSA-    In 1 month Claudell Byes, [address] Hematology Oncology 4 m f/u.md    In 3 months Praneeth Her MD [address], [facility] 84 5 month follow up and medicare physical                       Future Appointments         Provider Department Appt Notes    In 1 month SERJIO DOMINGUEZ-PSA-    In 1 month Claudell Byes, [address] Hematology Oncology 4 m f/u.md    In 3 months Praneeth Her MD [address], [place] [address] 5 month follow up and medicare physical          Recent Outpatient Visits              1 month ago Type 2 diabetes mellitus without complication, without long-term current use of insulin (HCC)    Darris Smart, Debbra Gaucher, MD    Office Visit    2 months ago Prostate cancer [facility])    [facility] Hematology Oncology Chyan Jameson MD    Office Visit    2 months ago Prostate cancer [facility])    [address] - Infusion    Nurse Only    2 months ago Prostate cancer [facility])    [facility] Hematology Oncology    Nurse Only    4 months ago Urgency of urination    Alethea Andersen MD    Office Visit

## 2023-11-14 ENCOUNTER — NURSE ONLY (OUTPATIENT)
Dept: HEMATOLOGY/ONCOLOGY | Facility: HOSPITAL | Age: 88
End: 2023-11-14
Attending: INTERNAL MEDICINE
Payer: MEDICARE

## 2023-11-14 VITALS
RESPIRATION RATE: 16 BRPM | BODY MASS INDEX: 26.22 KG/M2 | HEIGHT: 68 IN | WEIGHT: 173 LBS | HEART RATE: 107 BPM | SYSTOLIC BLOOD PRESSURE: 114 MMHG | OXYGEN SATURATION: 99 % | DIASTOLIC BLOOD PRESSURE: 70 MMHG | TEMPERATURE: 98 F

## 2023-11-14 DIAGNOSIS — C61 PROSTATE CANCER (HCC): ICD-10-CM

## 2023-11-14 DIAGNOSIS — C61 PROSTATE CANCER (HCC): Primary | ICD-10-CM

## 2023-11-14 LAB
PSA SERPL-MCNC: <0.01 NG/ML (ref ?–4)
PSA SERPL-MCNC: <0.04 NG/ML (ref ?–4)

## 2023-11-14 PROCEDURE — 36415 COLL VENOUS BLD VENIPUNCTURE: CPT

## 2023-11-14 PROCEDURE — 99214 OFFICE O/P EST MOD 30 MIN: CPT | Performed by: INTERNAL MEDICINE

## 2023-11-14 PROCEDURE — 84153 ASSAY OF PSA TOTAL: CPT

## 2024-01-08 NOTE — PROGRESS NOTES
Taylor Lynda is a 81year old male. Patient presents with:  Hypertension  Follow-up of diabetes, hypertension, dyslipidemia. HPI:   81-year-old gentleman with a past medical history of diabetes, hypertension, dyslipidemia, BPH here for follow-up.   He Tobacco Use      Smoking status: Former Smoker        Types: Cigarettes      Smokeless tobacco: Never Used    Alcohol use: No    Drug use: No     Family History   Problem Relation Age of Onset   • Heart Disease Father 79        CAD      No Known Allergies next visit. His last lipid profile was pretty good. 3. Type 2 diabetes mellitus without complication, without long-term current use of insulin (HCC)  Excellent hemoglobin A1c. Continue with ACE inhibitor's and statins.   He reports that he has complete room air

## 2024-02-01 RX ORDER — LOVASTATIN 20 MG/1
20 TABLET ORAL NIGHTLY
Qty: 90 TABLET | Refills: 3 | Status: SHIPPED | OUTPATIENT
Start: 2024-02-01

## 2024-02-01 NOTE — TELEPHONE ENCOUNTER
RN, please call patient to complete labs from 8/2023. Patient is not active on mychart. This RN is working from home and does not have access to phone. Thanks.    Future Appointments   Date Time Provider Department Center   2/27/2024 11:30 AM [facility] MD [facility]   3/12/2024 10:30 AM [facility] HEM ONC [facility]   3/12/2024 11:30 AM Viktor Bloom MD [facility] HEM ONC E[facility]

## 2024-02-01 NOTE — TELEPHONE ENCOUNTER
, please advise on refill request    Spoke to patient's son (on LEXIE) and he was advised patient needed to complete lab work.

## 2024-02-01 NOTE — TELEPHONE ENCOUNTER
Please review. Protocol Failed or has No Protocol.    Requested Prescriptions   Pending Prescriptions Disp Refills    LOVASTATIN 20 MG Oral Tab [Pharmacy Med Name: LOVASTATIN 20MG TABLETS] 90 tablet 1     Sig: TAKE 1 TABLET(20 MG) BY MOUTH EVERY NIGHT       Cholesterol Medication Protocol Failed - 1/31/2024  5:46 AM        Failed - LDL in past 12 months        Failed - Last LDL < 130     Lab Results   Component Value Date    LDL 68 11/09/2022             Passed - ALT in past 12 months        Passed - Last ALT < 80     Lab Results   Component Value Date    ALT 16 04/06/2023             Passed - In person appointment or virtual visit in the past 12 mos or appointment in next 3 mos     Recent Outpatient Visits              2 months ago Prostate cancer (HCC)    [facility] Hematology Oncology Viktor Bloom MD    Office Visit    2 months ago Prostate cancer (HCC)    [facility] - Infusion    Nurse Only    5 months ago Type 2 diabetes mellitus without complication, without long-term current use of insulin (HCC)    [facility] Ayden Samano MD    Office Visit    6 months ago Prostate cancer (HCC)    [facility] Hematology Oncology Viktor Bloom MD    Office Visit    6 months ago Prostate cancer (HCC)    [facility] - Infusion    Nurse Only          Future Appointments         Provider Department Appt Notes    In 3 weeks Ayden Samano MD [facility], [address], [place] medicare physical  Policy advised; son reschld appt    In 1 month [facility] RESOURCE [facility] Hematology Oncology FT-PSA.md    In 1 month Viktor Bloom MD [facility] Hematology Oncology 4 m f/u.md                    Recent Outpatient Visits              2 months ago Prostate cancer (HCC)    [facility] Hematology Oncology Viktor Bloom MD    Office Visit    2 months ago Prostate cancer (HCC)    [facility]    Nurse Only    5 months ago Type 2 diabetes mellitus without complication, without long-term current use of insulin (HCC)    [facility] Ayden Samano MD    Office Visit    6 months ago Prostate cancer (HCC)    [facility] Hematology Oncology Viktor Bloom MD    Office Visit    6 months ago Prostate cancer (HCC)    [facility] [place] - Infusion    Nurse Only            Future Appointments         Provider Department Appt Notes    In 3 weeks Ayden Samano MD [facility], [address], [place] medicare physical  Policy advised; son reschld appt    In 1 month [facility] Hematology Oncology FT-PSA.md    In 1 month Viktor Bloom MD [facility] Hematology Oncology 4 m f/u.md

## 2024-03-12 ENCOUNTER — TELEPHONE (OUTPATIENT)
Dept: HEMATOLOGY/ONCOLOGY | Facility: HOSPITAL | Age: 89
End: 2024-03-12

## 2024-03-12 ENCOUNTER — NURSE ONLY (OUTPATIENT)
Dept: HEMATOLOGY/ONCOLOGY | Facility: HOSPITAL | Age: 89
End: 2024-03-12
Attending: INTERNAL MEDICINE
Payer: MEDICARE

## 2024-03-12 VITALS
SYSTOLIC BLOOD PRESSURE: 168 MMHG | BODY MASS INDEX: 25.01 KG/M2 | DIASTOLIC BLOOD PRESSURE: 88 MMHG | WEIGHT: 165 LBS | HEART RATE: 82 BPM | TEMPERATURE: 98 F | RESPIRATION RATE: 18 BRPM | OXYGEN SATURATION: 98 % | HEIGHT: 68 IN

## 2024-03-12 DIAGNOSIS — Z08 ENCOUNTER FOR FOLLOW-UP SURVEILLANCE OF PROSTATE CANCER: ICD-10-CM

## 2024-03-12 DIAGNOSIS — C61 PROSTATE CANCER (HCC): ICD-10-CM

## 2024-03-12 DIAGNOSIS — D64.9 NORMOCYTIC ANEMIA: Primary | ICD-10-CM

## 2024-03-12 DIAGNOSIS — Z85.46 ENCOUNTER FOR FOLLOW-UP SURVEILLANCE OF PROSTATE CANCER: ICD-10-CM

## 2024-03-12 DIAGNOSIS — D64.9 NORMOCYTIC ANEMIA: ICD-10-CM

## 2024-03-12 LAB
ALBUMIN SERPL-MCNC: 4.1 G/DL (ref 3.2–4.8)
ALBUMIN/GLOB SERPL: 1.2 {RATIO} (ref 1–2)
ALP LIVER SERPL-CCNC: 78 U/L
ALT SERPL-CCNC: <7 U/L
ANION GAP SERPL CALC-SCNC: 6 MMOL/L (ref 0–18)
AST SERPL-CCNC: 14 U/L (ref ?–34)
BILIRUB SERPL-MCNC: 0.5 MG/DL (ref 0.2–1.1)
BUN BLD-MCNC: 17 MG/DL (ref 9–23)
BUN/CREAT SERPL: 14.3 (ref 10–20)
CALCIUM BLD-MCNC: 10.1 MG/DL (ref 8.7–10.4)
CHLORIDE SERPL-SCNC: 111 MMOL/L (ref 98–112)
CO2 SERPL-SCNC: 28 MMOL/L (ref 21–32)
CREAT BLD-MCNC: 1.19 MG/DL
DEPRECATED HBV CORE AB SER IA-ACNC: 30.6 NG/ML
EGFRCR SERPLBLD CKD-EPI 2021: 59 ML/MIN/1.73M2 (ref 60–?)
GLOBULIN PLAS-MCNC: 3.3 G/DL (ref 2.8–4.4)
GLUCOSE BLD-MCNC: 122 MG/DL (ref 70–99)
HAPTOGLOB SERPL-MCNC: 118 MG/DL (ref 40–280)
IRON SATN MFR SERPL: 11 %
IRON SERPL-MCNC: 36 UG/DL
OSMOLALITY SERPL CALC.SUM OF ELEC: 303 MOSM/KG (ref 275–295)
POTASSIUM SERPL-SCNC: 4 MMOL/L (ref 3.5–5.1)
PROT SERPL-MCNC: 7.4 G/DL (ref 5.7–8.2)
PSA SERPL-MCNC: <0.04 NG/ML (ref ?–4)
SODIUM SERPL-SCNC: 145 MMOL/L (ref 136–145)
TIBC SERPL-MCNC: 341 UG/DL (ref 250–425)
TRANSFERRIN SERPL-MCNC: 229 MG/DL (ref 215–365)

## 2024-03-12 PROCEDURE — 84153 ASSAY OF PSA TOTAL: CPT

## 2024-03-12 PROCEDURE — 80053 COMPREHEN METABOLIC PANEL: CPT

## 2024-03-12 PROCEDURE — 99214 OFFICE O/P EST MOD 30 MIN: CPT | Performed by: INTERNAL MEDICINE

## 2024-03-12 PROCEDURE — 83540 ASSAY OF IRON: CPT

## 2024-03-12 PROCEDURE — 82728 ASSAY OF FERRITIN: CPT

## 2024-03-12 PROCEDURE — 36415 COLL VENOUS BLD VENIPUNCTURE: CPT

## 2024-03-12 PROCEDURE — 84466 ASSAY OF TRANSFERRIN: CPT

## 2024-03-12 PROCEDURE — 83010 ASSAY OF HAPTOGLOBIN QUANT: CPT

## 2024-03-12 NOTE — PROGRESS NOTES
[facility] Progress Note    Patient Name: Leonard Shay   YOB: 1935   Medical Record Number: [id number]   CSN: [id number]   Consulting Physician: Viktor Bloom MD  Referring Physician(s): No ref. provider found  Date of Visit: 3/12/2024     Chief Complaint:  High Risk Disease prostate cancer; grade group 5; adenocarcinoma, Laney 4+5=9,[id number], stage 1    History of Present Illness:     Leonard Shay is a 88 year old male that was seen today in the Cancer Center for evaluation of the above condition. Patient has PMH relevant for chronic low back pain in the lumbar spine region, diabetes on metformin who presents following prostate cancer diagnosis. Patient was diagnosed on 12/13/2021 with Laney score 4+5=9; grade group 5, very high risk disease. His PSA of 15.7 ng/mL on 11/1/2021. Patient has been seen and evaluated by urology and radiation oncology and planned for EBRT with ADT therapy. Patient was given his first dose of ADT via Eligard (Leuprolide) on 1/7/2022 at 45 mg and planned for 6 month dosing.  Patient reports that following the injection he developed likely a small hematoma beneath the skin which was stable for about 5 to 6 days after receiving his ADT therapy. Patient mentions that following CT simulation he was laying in an unusual position and then felt his injection site in the right lower abdominal region was more irritated. \"Alyssia\" then went home and placed ice and a cream on the site and it caused skin blistering and further irritation.  Today, the skin has improved with no worsening skin lesions. His skin reaction was not associated with any itching, dyspnea, or anaphylactic reaction features.  Patient has completed bone imaging study on 12/27 showing no evidence of distant disease involvement as well as CT abdomen pelvis imaging on 12/31 showing no evidence of local regional extension of prostate cancer with regards to pelvic lymphadenopathy. Alyssia lives independently  with an excellent performance status and denies any systemic signs of illness as he reports he has been working on weight loss with regards to his diabetes. He denies any new back pain or bone pain or any other concerns on ROS.    His surveillance CT chest imaging showing no evidence of metastatic disease. He has completed EBRT on 3/24/2022.    Interval History:  Patient presents for surveillance of his high risk prostate cancer s/p planned ADT treatment course. His PSA is undetectable. The patient denies any change in functional status or quality of life.  His weight is decreased; previously mentioned that his appetite tends to fluctuate. Patient denies any blood loss, reports eating meat, denies any systemic signs of illness or new concerns on ROS.     Past Medical History:  Past Medical History:   Diagnosis Date    Allergic rhinitis 3/15/2012    Anemia 2/23/2014    Anemia due to blood loss     Aspirin long-term use 5/3/2021    Chronic low back pain     lumbar spine; now s/p lumbar fusion    Diabetes (HCC)     Diabetes mellitus, type 2 (pick complications) (HCC) 1/26/2022    History of diverticulosis 5/3/2021    Lipid screening 04/24/2013    Other and unspecified hyperlipidemia     Other male erectile dysfunction 9/29/2011    Rectal bleeding 05/02/2021    hemorrhoidal bleeding    Rotator cuff tear, left 1993    Unspecified essential hypertension        Past Surgical History:  Past Surgical History:   Procedure Laterality Date    APPENDECTOMY      COLONOSCOPY N/A 5/4/2021    Procedure: COLONOSCOPY;  Surgeon: Ulysses Harley MD;  Location: [facility] ENDOSCOPY    LAMINECTOMY  1987    LUMBAR SPINE FUSION COMBINED  8-8-13    REPAIR ROTATOR CUFF,ACUTE Left 1993    TONSILLECTOMY         Family Medical History:  Family History   Problem Relation Age of Onset    Heart Disease Father 70        CAD    Cancer Brother 83    Prostate Cancer Son 57    Cancer Brother 81        portion of bowel    Breast Cancer Neg     Pancreatic Cancer  Neg     Ovarian Cancer Neg     Colon Cancer Neg          Social History:  Social History     Socioeconomic History    Marital status:      Spouse name: Not on file    Number of children: 4    Years of education: Not on file    Highest education level: Not on file   Occupational History    Not on file   Tobacco Use    Smoking status: Former     Packs/day: 1.00     Years: 20.00     Additional pack years: 0.00     Total pack years: 20.00     Types: Cigarettes     Quit date: 1972     Years since quittin.2    Smokeless tobacco: Never   Vaping Use    Vaping Use: Never used   Substance and Sexual Activity    Alcohol use: No     Comment: no alcohol use since     Drug use: No    Sexual activity: Not on file   Other Topics Concern     Service Not Asked    Blood Transfusions Not Asked    Caffeine Concern Yes    Occupational Exposure Not Asked    Hobby Hazards Not Asked    Sleep Concern Not Asked    Stress Concern Not Asked    Weight Concern Not Asked    Special Diet Not Asked    Back Care Not Asked    Exercise Not Asked    Bike Helmet Not Asked    Seat Belt Not Asked    Self-Exams Not Asked   Social History Narrative    Alyssia is . Father of 5 adult children, multiple grandchildren and great grandchildren. Patient is retired from being a  for the [employer]. Patient lives alone in [place], [place]. Patient enjoys time with his dogs, fans of the [employer], and basketball.      Social Determinants of Health     Financial Resource Strain: Not on file   Food Insecurity: Not on file   Transportation Needs: Not on file   Physical Activity: Not on file   Stress: Not on file   Social Connections: Not on file   Housing Stability: Not on file       Allergies:   Allergies   Allergen Reactions    Eligard [Leuprolide] RASH     Rash with large sized blisters       Current Medications:   Lovastatin 20 MG Oral Tab Take 1 tablet (20 mg total) by mouth nightly. 90 tablet 3    pantoprazole 40 MG Oral Tab EC  Take 1 tablet (40 mg total) by mouth before breakfast. 90 tablet 3    amLODIPine 5 MG Oral Tab Take 1 tablet (5 mg total) by mouth daily. 90 tablet 3    metFORMIN  MG Oral Tablet 24 Hr Take 1 tablet (500 mg total) by mouth daily. 90 tablet 1    lisinopril-hydroCHLOROthiazide 20-12.5 MG Oral Tab Take 2 tablets by mouth daily. 180 tablet 3    TAMSULOSIN 0.4 MG Oral Cap TAKE 1 CAPSULE(0.4 MG) BY MOUTH DAILY 90 capsule 1    Ferrous Sulfate 325 (65 Fe) MG Oral Tab Take 1 tablet (325 mg total) by mouth daily with breakfast. 90 tablet 1    Fluticasone Propionate 50 MCG/ACT Nasal Suspension INHALE 1 SPRAY BY NASAL ROUTE DAILY 1 Bottle 0    GABAPENTIN 300 MG Oral Cap TAKE 2 CAPSULES BY MOUTH NIGHTLY 180 capsule 3    aspirin 81 MG Oral Tab EC Take 1 tablet (81 mg total) by mouth daily.        leuprolide (ELIGARD) depot injection 45 mg  45 mg Subcutaneous Q6 Months Alicia Bañuelos MD   45 mg at 01/07/22 [id number]       Review of Systems:    Constitutional: Negative for anorexia, chills, fatigue, fevers, night sweats and weight loss.  Eyes: Negative for visual disturbance, irritation and redness.  Respiratory: Negative for cough, hemoptysis, chest pain, or dyspnea on exertion.  Gastrointestinal: Negative for dysphagia, odynophagia, reflux symptoms, nausea, vomiting, change in bowel habits, diarrhea, constipation and abdominal pain.  Integument/breast: Negative for new skin lesions.  Hematologic/lymphatic: Negative for easy bruising, bleeding, and lymphadenopathy.  Musculoskeletal: +chronic low back pain; not endorsed today  Neurological: Negative for headaches, dizziness, speech problems, gait problems and weakness.    A comprehensive 14 point review of systems was completed.  Pertinent positives and negatives noted in the the HPI.     Vital Signs:  BP (!) 168/88 (BP Location: Right arm, Patient Position: Sitting, Cuff Size: adult)   Pulse 82   Temp 97.9 °F (36.6 °C) (Oral)   Resp 18   Ht 1.727 m (5' 8\")   Wt 74.8 kg  (165 lb)   SpO2 98%   BMI 25.09 kg/m²     Physical Examination:    General: Patient is alert and oriented x 3, not in acute distress.  Psych: Friendly, cooperative with appropriate questions and responses  HEENT: EOMs intact. Oropharynx is clear.   Neck: No palpable lymphadenopathy. Neck is supple.  Lymphatics: There is no palpable lymphadenopathy throughout in the cervical, supraclavicular, axillary, or inguinal regions.  Chest: Clear to auscultation. No wheezes or rales.  Heart: Regular rate and rhythm. S1S2 normal.  Abdomen: Soft, non tender with good bowel sounds.  No hepatosplenomegaly.  No palpable mass.  Extremities: No edema or calf tenderness.  Neurological: Grossly intact.     Performance Status:    ECOG 0: Fully active, able to carry on all pre-disease performance without restriction      Labs:    Lab Results   Component Value Date/Time    WBC 4.3 05/09/2023 12:00 PM    RBC 4.24 05/09/2023 12:00 PM    HGB 10.5 (L) 05/09/2023 12:00 PM    HCT 34.8 (L) 05/09/2023 12:00 PM    MCV 82.1 05/09/2023 12:00 PM    MCH 24.8 (L) 05/09/2023 12:00 PM    MCHC 30.2 (L) 05/09/2023 12:00 PM    RDW 16.6 (H) 05/09/2023 12:00 PM    NEPRELIM 2.12 04/06/2023 05:33 PM    .0 05/09/2023 12:00 PM       Lab Results   Component Value Date/Time     (H) 04/06/2023 05:33 PM    BUN 21 (H) 04/06/2023 05:33 PM    CREATSERUM 1.13 04/06/2023 05:33 PM    GFRNAA 62 06/24/2022 06:53 AM    CA 9.8 04/06/2023 05:33 PM    ALB 3.4 04/06/2023 05:33 PM     04/06/2023 05:33 PM    K 3.6 04/06/2023 05:33 PM     04/06/2023 05:33 PM    CO2 31.0 04/06/2023 05:33 PM    ALKPHO 79 04/06/2023 05:33 PM    AST 11 (L) 04/06/2023 05:33 PM    ALT 16 04/06/2023 05:33 PM       Recent Results (from the past [id number] hour(s))   PSA - DIAGNOSTIC [E]    Collection Time: 03/12/24 10:43 AM   Result Value Ref Range    Total PSA  <0.04 <=4.00 ng/mL     *Note: Due to a large number of results and/or encounters for the requested time period, some  results have not been displayed. A complete set of results can be found in Results Review.       Imaging:    N/A        Impression:      ICD-10-CM    1. Normocytic anemia  D64.9 CBC With Differential With Platelet     Folic Acid Serum (Folate)     Ferritin     Haptoglobin     LDH     Iron And Tibc     Reticulocyte Count     Vitamin B12     Comp Metabolic Panel (14)          88 year old M with PMH elevant for chronic low back pain in the lumbar spine region, diabetes on metformin who presents following prostate cancer diagnosis.    Plan:    1.) Prostate Cancer--High Risk disease    --Laney score 4+5=9; grade group 5, high risk disease. His PSA of 15.7 ng/mL on 11/1/2021  --I have discussed with the pt and his son, Maxwell, his diagnosis, his stage of disease, his prostate cancer risk categorization and why the standard of care would be EBRT+(ADT for 2 yrs) is definitive therapy for his prostate cancer  --discussed pt's CT chest imaging with him and his son, Sudheer, showing NO signs of distant metastatic disease  --his PSA was already lowered s/p 1st dose of ADT in 1/2022; then down to 3.07, his testosterone levels are suppressed s/p first dose of ADT injection  --pt had a skin reaction in relation to Eliguard. He was given the 45 mg every 6 month dosing  --next dosing of ADT would be 7/7/2022.   --We will plan Trelstar 11.25 mg every 3 months would be a nice alternative for him as he needs treatment with ADT for 2 yrs    --Called the patient and explained miscalculation of his risk profile as he only has high risk disease and does not need to take the extra step of taking abiraterone+prednisone. Patient understood these instructions, has not suffered any side effects from oral ADT and thanked me for the call. Explained the situation to his two sons who were appreciative of the call and information.     --pt received Trelstar ADT in 10/2022; tolerated this form of ADT well without any skin reaction, lesions or  injections    --continued Trelstar ADT 11.25 mg every 3 months to treat his prostate cancer until 7/2023    --we reviewed that we will plan only 18 months of ADT (completion of therapy 7/18/2023) as he has some worsening memory issues which may be from advancing age, but can be worsened by ADT    --his PSA was undetectable at <0.01 ng/mL in July, 2023    --RTC today for repeat eval; PSA is still undetectable at <0.04; memory impairment is still present despite being off ADT    --low suspicion for progression of disease in light of his prostate cancer history  --repeat eval in 6 months for follow up and repeat PSA check    --Discussed the side effect profile of this form of Androgen Deprivation Therapy.   --He is tolerating this form of ADT better than Lupron in terms of not causing any skin rash/blisters      2.) Chronic Low Back Pain    --Noted condition present for many years with lumbar sacral spinal fusion, unrelated to prostate cancer involvement in the bone    3.) Normocytic Anemia    --anemia labs ordered; present since 2011, suspect anemia of chronic disease in light of HTN and likely DM  --advised to f/u with his PCP in light of HTN noted today  --denies blood loss from any orifice    MDM: Moderate Risk    Viktor Bloom MD  [facility]  [place] [facility], [place] [address]

## 2024-03-12 NOTE — TELEPHONE ENCOUNTER
Called and spoke with patient's son, Sudheer. He stated he wanted an update on his dad's visit from today. Told him Told him Dr. Bloom's note from today states, \"PSA is still undetectable at < 0.04. Low suspicion for progression of disease in light of his prostate cancer history. Repeat evaluation in 6 months for follow up and repeat PSA check\". He stated understanding. Also, told him Dr. Bloom ordered repeat anemia labs today, reviewed them and stated, \"Hemoglobin is stable at 11. No signs of folic acid deficiency. No signs of red cell destruction. Iron stores are normal. Serum iron levels are lower, recommend a GI evaluation to looks for any signs of bleeding. Patient should be taking medications with food to prevent stomach ulcers\". Also, told him there are 2 labs that weren't able to be added on, so if the patient could complete those labs (LDH, vitamin B-12) at his earliest convenience, that would be ideal. He stated understanding and thanked me for the call.

## 2024-03-12 NOTE — TELEPHONE ENCOUNTER
Sudheer (son) called for an update on the patient after his appointments today.  Please call back.  Thank you.

## 2024-03-19 ENCOUNTER — OFFICE VISIT (OUTPATIENT)
Dept: INTERNAL MEDICINE CLINIC | Facility: CLINIC | Age: 89
End: 2024-03-19

## 2024-03-19 VITALS
HEIGHT: 68 IN | BODY MASS INDEX: 26.37 KG/M2 | DIASTOLIC BLOOD PRESSURE: 95 MMHG | RESPIRATION RATE: 16 BRPM | WEIGHT: 174 LBS | SYSTOLIC BLOOD PRESSURE: 154 MMHG | HEART RATE: 82 BPM

## 2024-03-19 DIAGNOSIS — D50.9 IRON DEFICIENCY ANEMIA, UNSPECIFIED IRON DEFICIENCY ANEMIA TYPE: ICD-10-CM

## 2024-03-19 DIAGNOSIS — E78.5 HYPERLIPIDEMIA, UNSPECIFIED HYPERLIPIDEMIA TYPE: ICD-10-CM

## 2024-03-19 DIAGNOSIS — F03.90 DEMENTIA WITHOUT BEHAVIORAL DISTURBANCE, PSYCHOTIC DISTURBANCE, MOOD DISTURBANCE, OR ANXIETY, UNSPECIFIED DEMENTIA SEVERITY, UNSPECIFIED DEMENTIA TYPE (HCC): ICD-10-CM

## 2024-03-19 DIAGNOSIS — N40.0 BENIGN PROSTATIC HYPERPLASIA WITHOUT LOWER URINARY TRACT SYMPTOMS: ICD-10-CM

## 2024-03-19 DIAGNOSIS — E11.9 TYPE 2 DIABETES MELLITUS WITHOUT COMPLICATION, WITHOUT LONG-TERM CURRENT USE OF INSULIN (HCC): ICD-10-CM

## 2024-03-19 DIAGNOSIS — I10 HYPERTENSION, BENIGN: Primary | ICD-10-CM

## 2024-03-19 DIAGNOSIS — R39.15 URINARY URGENCY: ICD-10-CM

## 2024-03-19 PROBLEM — N18.30 CKD (CHRONIC KIDNEY DISEASE) STAGE 3, GFR 30-59 ML/MIN (HCC): Chronic | Status: ACTIVE | Noted: 2024-03-19

## 2024-03-19 PROCEDURE — 1159F MED LIST DOCD IN RCRD: CPT | Performed by: NURSE PRACTITIONER

## 2024-03-19 PROCEDURE — 3008F BODY MASS INDEX DOCD: CPT | Performed by: NURSE PRACTITIONER

## 2024-03-19 PROCEDURE — 3080F DIAST BP >= 90 MM HG: CPT | Performed by: NURSE PRACTITIONER

## 2024-03-19 PROCEDURE — 3077F SYST BP >= 140 MM HG: CPT | Performed by: NURSE PRACTITIONER

## 2024-03-19 PROCEDURE — 1160F RVW MEDS BY RX/DR IN RCRD: CPT | Performed by: NURSE PRACTITIONER

## 2024-03-19 PROCEDURE — 99214 OFFICE O/P EST MOD 30 MIN: CPT | Performed by: NURSE PRACTITIONER

## 2024-03-19 RX ORDER — METFORMIN HYDROCHLORIDE 500 MG/1
500 TABLET, EXTENDED RELEASE ORAL DAILY
Qty: 90 TABLET | Refills: 0 | Status: SHIPPED | OUTPATIENT
Start: 2024-03-19

## 2024-03-19 RX ORDER — PANTOPRAZOLE SODIUM 40 MG/1
40 TABLET, DELAYED RELEASE ORAL
Qty: 90 TABLET | Refills: 0 | Status: SHIPPED | OUTPATIENT
Start: 2024-03-19

## 2024-03-19 RX ORDER — TAMSULOSIN HYDROCHLORIDE 0.4 MG/1
0.4 CAPSULE ORAL DAILY
Qty: 90 CAPSULE | Refills: 1 | Status: SHIPPED | OUTPATIENT
Start: 2024-03-19

## 2024-03-19 RX ORDER — MIRABEGRON 50 MG/1
50 TABLET, FILM COATED, EXTENDED RELEASE ORAL DAILY
Qty: 30 TABLET | Refills: 0 | Status: SHIPPED | OUTPATIENT
Start: 2024-03-19

## 2024-03-19 RX ORDER — MIRABEGRON 50 MG/1
50 TABLET, FILM COATED, EXTENDED RELEASE ORAL DAILY
COMMUNITY
End: 2024-03-19

## 2024-03-19 RX ORDER — LISINOPRIL AND HYDROCHLOROTHIAZIDE 20; 12.5 MG/1; MG/1
2 TABLET ORAL DAILY
Qty: 180 TABLET | Refills: 3 | Status: SHIPPED | OUTPATIENT
Start: 2024-03-19

## 2024-03-19 NOTE — PROGRESS NOTES
Leonard Shay is a 88 year old male.  Chief Complaint   Patient presents with    Hypertension     Discuss medications     HPI:   He presents to the office with son Maxwell.     He has history of HTN. His blood pressure is elevated in office. He ran out of lisinopril-hydrochlorothiazide and he has not taken norvasc.     He also has a history of diabetes and takes metfomin. He does not check his blood sugars at home. His last A1c was 7.1 in 4/2023.     He has blood work ordered by his PCP which has not been completed.     His son brought multiple medications bottles to the office to clarify what his father should be taking. Patient has a history of dementia and needs help with his medications.       Current Outpatient Medications   Medication Sig Dispense Refill    lisinopril-hydroCHLOROthiazide 20-12.5 MG Oral Tab Take 2 tablets by mouth daily. 180 tablet 3    tamsulosin 0.4 MG Oral Cap Take 1 capsule (0.4 mg total) by mouth daily. 90 capsule 1    pantoprazole 40 MG Oral Tab EC Take 1 tablet (40 mg total) by mouth before breakfast. 90 tablet 0    metFORMIN  MG Oral Tablet 24 Hr Take 1 tablet (500 mg total) by mouth daily. 90 tablet 0    Mirabegron ER (MYRBETRIQ) 50 MG Oral Tablet 24 Hr Take 1 tablet (50 mg total) by mouth daily. 30 tablet 0    amLODIPine 5 MG Oral Tab Take 1 tablet (5 mg total) by mouth daily. 90 tablet 3    Ferrous Sulfate 325 (65 Fe) MG Oral Tab Take 1 tablet (325 mg total) by mouth daily with breakfast. 90 tablet 1    GABAPENTIN 300 MG Oral Cap TAKE 2 CAPSULES BY MOUTH NIGHTLY 180 capsule 3    aspirin 81 MG Oral Tab EC Take 1 tablet (81 mg total) by mouth daily.      Lovastatin 20 MG Oral Tab Take 1 tablet (20 mg total) by mouth nightly. 90 tablet 3    Fluticasone Propionate 50 MCG/ACT Nasal Suspension INHALE 1 SPRAY BY NASAL ROUTE DAILY 1 Bottle 0      Past Medical History:   Diagnosis Date    Allergic rhinitis 3/15/2012    Anemia 2/23/2014    Anemia due to blood loss     Aspirin long-term  Stop taking your simvastatin while you are taking the COVID medications   use 5/3/2021    Chronic low back pain     lumbar spine; now s/p lumbar fusion    Diabetes (HCC)     Diabetes mellitus, type 2 (pick complications) (HCC) 2022    History of diverticulosis 5/3/2021    Lipid screening 2013    Other and unspecified hyperlipidemia     Other male erectile dysfunction 2011    Rectal bleeding 2021    hemorrhoidal bleeding    Rotator cuff tear, left     Unspecified essential hypertension       Past Surgical History:   Procedure Laterality Date    APPENDECTOMY      COLONOSCOPY N/A 2021    Procedure: COLONOSCOPY;  Surgeon: Ulysses Harley MD;  Location: [facility] ENDOSCOPY    LAMINECTOMY      LUMBAR SPINE FUSION COMBINED  13    REPAIR ROTATOR CUFF,ACUTE Left     TONSILLECTOMY        Social History:  Social History     Socioeconomic History    Marital status:     Number of children: 4   Tobacco Use    Smoking status: Former     Packs/day: 1.00     Years: 20.00     Additional pack years: 0.00     Total pack years: 20.00     Types: Cigarettes     Quit date: 1972     Years since quittin.2    Smokeless tobacco: Never   Vaping Use    Vaping Use: Never used   Substance and Sexual Activity    Alcohol use: No     Comment: no alcohol use since     Drug use: No   Other Topics Concern    Caffeine Concern Yes   Social History Narrative    Alyssia is . Father of 5 adult children, multiple grandchildren and great grandchildren. Patient is retired from being a  for the [employer]. Patient lives alone in [place], [place]. Patient enjoys time with his dogs, fans of the Cubs, and basketball.       Family History   Problem Relation Age of Onset    Heart Disease Father 70        CAD    Cancer Brother 83    Prostate Cancer Son 57    Cancer Brother 81        portion of bowel    Breast Cancer Neg     Pancreatic Cancer Neg     Ovarian Cancer Neg     Colon Cancer Neg       Allergies   Allergen Reactions    Eligard [Leuprolide] RASH     Rash with large  sized blisters        REVIEW OF SYSTEMS:     Review of Systems   Constitutional:  Negative for fever.   HENT: Negative.     Respiratory:  Negative for cough, shortness of breath and wheezing.    Cardiovascular:  Negative for chest pain.   Gastrointestinal: Negative.    Genitourinary: Negative.    Musculoskeletal: Negative.    Skin: Negative.    Neurological: Negative.    Psychiatric/Behavioral:  Positive for confusion.       Wt Readings from Last 5 Encounters:   03/19/24 174 lb (78.9 kg)   03/12/24 165 lb (74.8 kg)   11/14/23 173 lb (78.5 kg)   08/10/23 170 lb (77.1 kg)   07/18/23 169 lb 9.6 oz (76.9 kg)     Body mass index is 26.46 kg/m².      EXAM:   BP (!) 154/95   Pulse 82   Resp 16   Ht 5' 8\" (1.727 m)   Wt 174 lb (78.9 kg)   BMI 26.46 kg/m²     Physical Exam  Vitals reviewed.   Constitutional:       Appearance: Normal appearance.   HENT:      Head: Normocephalic.   Eyes:      Extraocular Movements: Extraocular movements intact.      Pupils: Pupils are equal, round, and reactive to light.   Cardiovascular:      Rate and Rhythm: Normal rate and regular rhythm.      Pulses: Normal pulses.   Pulmonary:      Breath sounds: Normal breath sounds. No wheezing.   Abdominal:      General: Bowel sounds are normal.      Palpations: Abdomen is soft.      Tenderness: There is no abdominal tenderness.   Musculoskeletal:         General: No swelling. Normal range of motion.      Cervical back: Normal range of motion and neck supple.   Skin:     General: Skin is warm and dry.   Neurological:      Mental Status: He is alert and oriented to person, place, and time.   Psychiatric:         Mood and Affect: Mood normal.         Behavior: Behavior normal.            ASSESSMENT AND PLAN:   1. Hypertension, benign  - BP elevated in office  - patient ran out of lisinopril-hydrochlorothiazide and did not take Norvasc today  - I suspect he is not complaint with his medication. His son is going to be helping him with his medications  and using a pill box.   - lisinopril-hydroCHLOROthiazide 20-12.5 MG Oral Tab; Take 2 tablets by mouth daily.  Dispense: 180 tablet; Refill: 3    2. Type 2 diabetes mellitus without complication, without long-term current use of insulin (HCC)  - need A1C checked, orders placed.   - last A1c was 7.1 4/2023   - metFORMIN  MG Oral Tablet 24 Hr; Take 1 tablet (500 mg total) by mouth daily.  Dispense: 90 tablet; Refill: 0    3. Hyperlipidemia, unspecified hyperlipidemia type  - continue lovastatin as prescribed  - check lipid panel, order placed by PCP    4. Benign prostatic hyperplasia without lower urinary tract symptoms  - tamsulosin 0.4 MG Oral Cap; Take 1 capsule (0.4 mg total) by mouth daily.  Dispense: 90 capsule; Refill: 1    5. Iron deficiency anemia, unspecified iron deficiency anemia type  - following with Dr Bloom  - taking ferrous sulfate 325mg daily     6. Urinary urgency  - continue MYRBETRIQ as prescribed    7. Dementia without behavioral disturbance, psychotic disturbance, mood disturbance, or anxiety, unspecified dementia severity, unspecified dementia type (HCC)   - not currently taking any medications  - was referred to neurology by PCP but did not follow up.       The patient indicates understanding of these issues and agrees to the plan.  Return for follow up with Dr Samano as scheduled 4/18 .

## 2024-03-20 ENCOUNTER — HOSPITAL ENCOUNTER (EMERGENCY)
Facility: HOSPITAL | Age: 89
Discharge: HOME | End: 2024-03-20
Attending: EMERGENCY MEDICINE
Payer: MEDICARE

## 2024-03-20 ENCOUNTER — APPOINTMENT (OUTPATIENT)
Dept: CT IMAGING | Facility: HOSPITAL | Age: 89
End: 2024-03-20
Attending: EMERGENCY MEDICINE
Payer: MEDICARE

## 2024-03-20 VITALS
BODY MASS INDEX: 24.8 KG/M2 | DIASTOLIC BLOOD PRESSURE: 97 MMHG | SYSTOLIC BLOOD PRESSURE: 154 MMHG | OXYGEN SATURATION: 96 % | WEIGHT: 158 LBS | HEART RATE: 83 BPM | TEMPERATURE: 99 F | HEIGHT: 67 IN | RESPIRATION RATE: 22 BRPM

## 2024-03-20 DIAGNOSIS — I10 PRIMARY HYPERTENSION: ICD-10-CM

## 2024-03-20 DIAGNOSIS — R19.7 DIARRHEA, UNSPECIFIED TYPE: Primary | ICD-10-CM

## 2024-03-20 LAB
ALBUMIN SERPL-MCNC: 4.3 G/DL (ref 3.2–4.8)
ALBUMIN/GLOB SERPL: 1.2 {RATIO} (ref 1–2)
ALP LIVER SERPL-CCNC: 80 U/L
ALT SERPL-CCNC: 24 U/L
ANION GAP SERPL CALC-SCNC: 6 MMOL/L (ref 0–18)
AST SERPL-CCNC: 30 U/L (ref ?–34)
BASOPHILS # BLD AUTO: 0.02 X10(3) UL (ref 0–0.2)
BASOPHILS NFR BLD AUTO: 0.4 %
BILIRUB SERPL-MCNC: 0.5 MG/DL (ref 0.2–1.1)
BILIRUB UR QL: NEGATIVE
BUN BLD-MCNC: 15 MG/DL (ref 9–23)
BUN/CREAT SERPL: 13.4 (ref 10–20)
CALCIUM BLD-MCNC: 9.9 MG/DL (ref 8.7–10.4)
CHLORIDE SERPL-SCNC: 111 MMOL/L (ref 98–112)
CLARITY UR: CLEAR
CO2 SERPL-SCNC: 27 MMOL/L (ref 21–32)
COLOR UR: YELLOW
CREAT BLD-MCNC: 1.12 MG/DL
DEPRECATED RDW RBC AUTO: 47.8 FL (ref 35.1–46.3)
EGFRCR SERPLBLD CKD-EPI 2021: 63 ML/MIN/1.73M2 (ref 60–?)
EOSINOPHIL # BLD AUTO: 0.04 X10(3) UL (ref 0–0.7)
EOSINOPHIL NFR BLD AUTO: 0.8 %
ERYTHROCYTE [DISTWIDTH] IN BLOOD BY AUTOMATED COUNT: 15.9 % (ref 11–15)
GLOBULIN PLAS-MCNC: 3.5 G/DL (ref 2.8–4.4)
GLUCOSE BLD-MCNC: 153 MG/DL (ref 70–99)
GLUCOSE UR-MCNC: 50 MG/DL
HCT VFR BLD AUTO: 35.7 %
HGB BLD-MCNC: 11.6 G/DL
HYALINE CASTS #/AREA URNS AUTO: PRESENT /LPF
IMM GRANULOCYTES # BLD AUTO: 0.01 X10(3) UL (ref 0–1)
IMM GRANULOCYTES NFR BLD: 0.2 %
KETONES UR-MCNC: NEGATIVE MG/DL
LEUKOCYTE ESTERASE UR QL STRIP.AUTO: NEGATIVE
LYMPHOCYTES # BLD AUTO: 0.78 X10(3) UL (ref 1–4)
LYMPHOCYTES NFR BLD AUTO: 15.9 %
MCH RBC QN AUTO: 26.5 PG (ref 26–34)
MCHC RBC AUTO-ENTMCNC: 32.5 G/DL (ref 31–37)
MCV RBC AUTO: 81.7 FL
MONOCYTES # BLD AUTO: 0.52 X10(3) UL (ref 0.1–1)
MONOCYTES NFR BLD AUTO: 10.6 %
NEUTROPHILS # BLD AUTO: 3.53 X10 (3) UL (ref 1.5–7.7)
NEUTROPHILS # BLD AUTO: 3.53 X10(3) UL (ref 1.5–7.7)
NEUTROPHILS NFR BLD AUTO: 72.1 %
NITRITE UR QL STRIP.AUTO: NEGATIVE
OSMOLALITY SERPL CALC.SUM OF ELEC: 302 MOSM/KG (ref 275–295)
PH UR: 6 [PH] (ref 5–8)
PLATELET # BLD AUTO: 243 10(3)UL (ref 150–450)
POTASSIUM SERPL-SCNC: 3.7 MMOL/L (ref 3.5–5.1)
PROT SERPL-MCNC: 7.8 G/DL (ref 5.7–8.2)
PROT UR-MCNC: 300 MG/DL
RBC # BLD AUTO: 4.37 X10(6)UL
SODIUM SERPL-SCNC: 144 MMOL/L (ref 136–145)
SP GR UR STRIP: 1.03 (ref 1–1.03)
UROBILINOGEN UR STRIP-ACNC: NORMAL
WBC # BLD AUTO: 4.9 X10(3) UL (ref 4–11)

## 2024-03-20 PROCEDURE — 99285 EMERGENCY DEPT VISIT HI MDM: CPT

## 2024-03-20 PROCEDURE — 96374 THER/PROPH/DIAG INJ IV PUSH: CPT

## 2024-03-20 PROCEDURE — 96375 TX/PRO/DX INJ NEW DRUG ADDON: CPT

## 2024-03-20 PROCEDURE — 80053 COMPREHEN METABOLIC PANEL: CPT | Performed by: EMERGENCY MEDICINE

## 2024-03-20 PROCEDURE — 85025 COMPLETE CBC W/AUTO DIFF WBC: CPT | Performed by: EMERGENCY MEDICINE

## 2024-03-20 PROCEDURE — 74177 CT ABD & PELVIS W/CONTRAST: CPT | Performed by: EMERGENCY MEDICINE

## 2024-03-20 PROCEDURE — 81001 URINALYSIS AUTO W/SCOPE: CPT | Performed by: EMERGENCY MEDICINE

## 2024-03-20 RX ORDER — ONDANSETRON 2 MG/ML
4 INJECTION INTRAMUSCULAR; INTRAVENOUS ONCE
Status: COMPLETED | OUTPATIENT
Start: 2024-03-20 | End: 2024-03-20

## 2024-03-20 RX ORDER — LABETALOL HYDROCHLORIDE 5 MG/ML
20 INJECTION, SOLUTION INTRAVENOUS ONCE
Status: COMPLETED | OUTPATIENT
Start: 2024-03-20 | End: 2024-03-20

## 2024-03-20 NOTE — ED PROVIDER NOTES
Patient Seen in: [facility] Emergency Department      History     Chief Complaint   Patient presents with    Nausea/Vomiting/Diarrhea     Stated Complaint: N/V    Subjective:   HPI    The patient is an 89-year-old male with a history of chronic anemia, dementia, diabetes, hypertension who presents with complaint to his son of feeling sick in his stomach this morning.  He had diarrhea last night after going out to dinner.  He did not vomit.  On arrival to the emergency department he states he feels better than he did this morning.  No further diarrhea.  No vomiting.  He denies abdominal pain.  Family took him to his doctor yesterday because they found multiple prescription bottles all over his apartment and were concerned that he was not taking the right medications at the right time.  The son took all of the bottles and has not picked up his correct medications because they were not ready.  He has not had his blood pressure medicine in 2 days.  He denies headache or chest pain.  No dizziness.  He is at his baseline mental status per family    Objective:   Past Medical History:   Diagnosis Date    Allergic rhinitis 3/15/2012    Anemia 2/23/2014    Anemia due to blood loss     Aspirin long-term use 5/3/2021    Chronic low back pain     lumbar spine; now s/p lumbar fusion    Diabetes (HCC)     Diabetes mellitus, type 2 (pick complications) ([facility]) 1/26/2022    History of diverticulosis 5/3/2021    Lipid screening 04/24/2013    Other and unspecified hyperlipidemia     Other male erectile dysfunction 9/29/2011    Rectal bleeding 05/02/2021    hemorrhoidal bleeding    Rotator cuff tear, left 1993    Unspecified essential hypertension               Past Surgical History:   Procedure Laterality Date    APPENDECTOMY      COLONOSCOPY N/A 5/4/2021    Procedure: COLONOSCOPY;  Surgeon: Ulysses Harley MD;  Location: [facility] ENDOSCOPY    LAMINECTOMY  1987    LUMBAR SPINE FUSION COMBINED  8-8-13    REPAIR ROTATOR CUFF,ACUTE  Left 1993    TONSILLECTOMY                  Social History     Socioeconomic History    Marital status:     Number of children: 4   Tobacco Use    Smoking status: Former     Packs/day: 1.00     Years: 20.00     Additional pack years: 0.00     Total pack years: 20.00     Types: Cigarettes     Quit date: 1972     Years since quittin.2    Smokeless tobacco: Never   Vaping Use    Vaping Use: Never used   Substance and Sexual Activity    Alcohol use: No     Comment: no alcohol use since     Drug use: No   Other Topics Concern    Caffeine Concern Yes   Social History Narrative    Alyssia is . Father of 5 adult children, multiple grandchildren and great grandchildren. Patient is retired from being a  for the [employer]. Patient lives alone in [place], [place]. Patient enjoys time with his dogs, fans of the Cubs, and basketball.               Review of Systems    Positive for stated complaint: N/V  Other systems are as noted in HPI.  Constitutional and vital signs reviewed.      All other systems reviewed and negative except as noted above.    Physical Exam     ED Triage Vitals [24 1319]   BP (!) 175/116   Pulse 105   Resp 18   Temp 98.5 °F (36.9 °C)   Temp src Oral   SpO2 99 %   O2 Device None (Room air)       Current:BP (!) 154/97   Pulse 83   Temp 98.5 °F (36.9 °C) (Oral)   Resp 22   Ht 170.2 cm (5' 7\")   Wt 71.7 kg   SpO2 96%   BMI 24.75 kg/m²         Physical Exam  Vitals and nursing note reviewed.   Constitutional:       General: He is not in acute distress.     Appearance: Normal appearance. He is well-developed. He is not ill-appearing.   HENT:      Head: Normocephalic and atraumatic.      Mouth/Throat:      Mouth: Mucous membranes are moist.   Eyes:      Conjunctiva/sclera: Conjunctivae normal.      Pupils: Pupils are equal, round, and reactive to light.   Neck:      Vascular: No JVD.   Cardiovascular:      Rate and Rhythm: Normal rate and regular rhythm.      Heart  sounds: Normal heart sounds. No murmur heard.  Pulmonary:      Effort: Pulmonary effort is normal.      Breath sounds: Normal breath sounds.   Abdominal:      General: Bowel sounds are normal. There is no distension.      Palpations: Abdomen is soft. There is no mass.      Tenderness: There is no abdominal tenderness. There is no right CVA tenderness, left CVA tenderness, guarding or rebound.   Musculoskeletal:         General: Normal range of motion.      Cervical back: Normal range of motion and neck supple.      Right lower leg: No edema.      Left lower leg: No edema.   Skin:     General: Skin is warm and dry.      Capillary Refill: Capillary refill takes less than 2 seconds.      Findings: No rash.   Neurological:      General: No focal deficit present.      Mental Status: He is alert and oriented to person, place, and time.      Cranial Nerves: No cranial nerve deficit.      Sensory: No sensory deficit.      Motor: No weakness.      Coordination: Coordination normal.      Gait: Gait normal.      Deep Tendon Reflexes: Reflexes are normal and symmetric.      Comments: Pleasantly confused at baseline per family   Psychiatric:         Judgment: Judgment normal.       Differential diagnosis includes infection, electrolyte abnormality, gastroenteritis, food intolerance, hypertensive urgency        ED Course     Labs Reviewed   COMP METABOLIC PANEL (14) - Abnormal; Notable for the following components:       Result Value    Glucose 153 (*)     Calculated Osmolality 302 (*)     All other components within normal limits   URINALYSIS, ROUTINE - Abnormal; Notable for the following components:    Glucose Urine 50 (*)     Blood Urine 2+ (*)     Protein Urine 300 (*)     RBC Urine 6-10 (*)     Hyaline Casts Present (*)     All other components within normal limits   CBC W/ DIFFERENTIAL - Abnormal; Notable for the following components:    HGB 11.6 (*)     HCT 35.7 (*)     RDW-SD 47.8 (*)     RDW 15.9 (*)     Lymphocyte  Absolute 0.78 (*)     All other components within normal limits   CBC WITH DIFFERENTIAL WITH PLATELET    Narrative:     The following orders were created for panel order CBC With Differential With Platelet.  Procedure                               Abnormality         Status                     ---------                               -----------         ------                     CBC W/ DIFFERENTIAL[[id number]]          Abnormal            Final result                 Please view results for these tests on the individual orders.   RAINBOW DRAW LAVENDER   RAINBOW DRAW LIGHT GREEN   RAINBOW DRAW GOLD                      [facility]                                         Medical Decision Making  Patient likely with mild gastroenteritis or food intolerance from going out to dinner last night.  Tolerating p.o. abdomen soft nontender and denies nausea vomiting or diarrhea currently  Patient blood pressure not controlled as he did not have his medications for 2 days was given labetalol to bring down his blood pressure and family will take him to get his prescriptions as soon as they leave here and he will take tonight  Vital signs stable prior to discharge    Problems Addressed:  Diarrhea, unspecified type: acute illness or injury  Primary hypertension: acute illness or injury    Amount and/or Complexity of Data Reviewed  Independent Historian: caregiver     Details: Son assisting with history  External Data Reviewed: notes.     Details: From patient's primary care visit yesterday reviewed regarding the medications he should be on  Labs: ordered.  Radiology: ordered and independent interpretation performed.     Details: No free air other results per radiologist        Disposition and Plan     Clinical Impression:  1. Diarrhea, unspecified type    2. Primary hypertension         Disposition:  Discharge  3/20/2024  6:50 pm    Follow-up:  Ayden Samano  [address] [facility] [address]  [facility] [address]  [phone]    Schedule an  appointment as soon as possible for a visit in 2 day(s)      We recommend that you schedule follow up care with a primary care provider within the next three months to obtain basic health screening including reassessment of your blood pressure.      Medications Prescribed:  Current Discharge Medication List

## 2024-03-20 NOTE — ED INITIAL ASSESSMENT (HPI)
Patient arrived ambulatory via triage with complain of N/V/D x 2 days. Patient denies fever and pain.    No

## 2024-03-20 NOTE — DISCHARGE INSTRUCTIONS
Clear liquids, bland diet bananas rice applesauce toast  Return if abdominal pain fever vomiting  Take your blood pressure medication when you get home as prescribed by your doctor yesterday

## 2024-05-07 ENCOUNTER — OFFICE VISIT (OUTPATIENT)
Facility: CLINIC | Age: 89
End: 2024-05-07
Payer: MEDICARE

## 2024-05-07 VITALS
DIASTOLIC BLOOD PRESSURE: 62 MMHG | SYSTOLIC BLOOD PRESSURE: 130 MMHG | HEART RATE: 73 BPM | BODY MASS INDEX: 25 KG/M2 | HEIGHT: 67 IN

## 2024-05-07 DIAGNOSIS — F03.A0 MILD DEMENTIA WITHOUT BEHAVIORAL DISTURBANCE, PSYCHOTIC DISTURBANCE, MOOD DISTURBANCE, OR ANXIETY, UNSPECIFIED DEMENTIA TYPE (HCC): Primary | ICD-10-CM

## 2024-05-07 DIAGNOSIS — I10 HYPERTENSION, BENIGN: ICD-10-CM

## 2024-05-07 DIAGNOSIS — E11.9 TYPE 2 DIABETES MELLITUS WITHOUT COMPLICATION, WITHOUT LONG-TERM CURRENT USE OF INSULIN (HCC): ICD-10-CM

## 2024-05-07 LAB
CREAT UR-SCNC: 242.1 MG/DL
HEMOGLOBIN A1C: 6.6 % (ref 4.3–5.6)
MICROALBUMIN UR-MCNC: 57.2 MG/DL
MICROALBUMIN/CREAT 24H UR-RTO: 236.3 UG/MG (ref ?–30)

## 2024-05-07 PROCEDURE — 3008F BODY MASS INDEX DOCD: CPT | Performed by: INTERNAL MEDICINE

## 2024-05-07 PROCEDURE — 1159F MED LIST DOCD IN RCRD: CPT | Performed by: INTERNAL MEDICINE

## 2024-05-07 PROCEDURE — 3078F DIAST BP <80 MM HG: CPT | Performed by: INTERNAL MEDICINE

## 2024-05-07 PROCEDURE — 99214 OFFICE O/P EST MOD 30 MIN: CPT | Performed by: INTERNAL MEDICINE

## 2024-05-07 PROCEDURE — 83036 HEMOGLOBIN GLYCOSYLATED A1C: CPT | Performed by: INTERNAL MEDICINE

## 2024-05-07 PROCEDURE — 3075F SYST BP GE 130 - 139MM HG: CPT | Performed by: INTERNAL MEDICINE

## 2024-05-07 PROCEDURE — G2211 COMPLEX E/M VISIT ADD ON: HCPCS | Performed by: INTERNAL MEDICINE

## 2024-05-07 RX ORDER — AMLODIPINE BESYLATE 5 MG/1
5 TABLET ORAL DAILY
Qty: 90 TABLET | Refills: 3 | Status: SHIPPED | OUTPATIENT
Start: 2024-05-07

## 2024-05-07 RX ORDER — METFORMIN HYDROCHLORIDE 500 MG/1
500 TABLET, EXTENDED RELEASE ORAL DAILY
Qty: 90 TABLET | Refills: 0 | Status: SHIPPED | OUTPATIENT
Start: 2024-05-07

## 2024-05-07 RX ORDER — DONEPEZIL HYDROCHLORIDE 5 MG/1
5 TABLET, FILM COATED ORAL NIGHTLY
Qty: 90 TABLET | Refills: 1 | Status: SHIPPED | OUTPATIENT
Start: 2024-05-07

## 2024-05-07 RX ORDER — MIRABEGRON 50 MG/1
50 TABLET, FILM COATED, EXTENDED RELEASE ORAL DAILY
Qty: 30 TABLET | Refills: 0 | Status: SHIPPED | OUTPATIENT
Start: 2024-05-07

## 2024-05-07 RX ORDER — GABAPENTIN 300 MG/1
600 CAPSULE ORAL NIGHTLY
Qty: 180 CAPSULE | Refills: 3 | Status: SHIPPED | OUTPATIENT
Start: 2024-05-07

## 2024-05-07 RX ORDER — PANTOPRAZOLE SODIUM 40 MG/1
40 TABLET, DELAYED RELEASE ORAL
Qty: 90 TABLET | Refills: 0 | Status: SHIPPED | OUTPATIENT
Start: 2024-05-07

## 2024-05-08 NOTE — PROGRESS NOTES
Leonard Shay is a 89 year old male.  Chief Complaint   Patient presents with    Follow - Up     HPI:   89-year-old gentleman here for follow-up.  He is accompanied with his son.  Son reports that he started to have memory issues.  I had a lengthy discussion with son regarding dementia medications.  He would like to proceed.  Referral given to neurology as well.  Denies any hypoglycemic events.  Denies any blood in the stool or blood in the urine.  He continues to follow-up with oncology for prostate cancer.      Current Outpatient Medications   Medication Sig Dispense Refill    amLODIPine 5 MG Oral Tab Take 1 tablet (5 mg total) by mouth daily. 90 tablet 3    gabapentin 300 MG Oral Cap Take 2 capsules (600 mg total) by mouth nightly. 180 capsule 3    metFORMIN  MG Oral Tablet 24 Hr Take 1 tablet (500 mg total) by mouth daily. 90 tablet 0    Mirabegron ER (MYRBETRIQ) 50 MG Oral Tablet 24 Hr Take 1 tablet (50 mg total) by mouth daily. 30 tablet 0    pantoprazole 40 MG Oral Tab EC Take 1 tablet (40 mg total) by mouth before breakfast. 90 tablet 0    donepezil (ARICEPT) 5 MG Oral Tab Take 1 tablet (5 mg total) by mouth nightly. 90 tablet 1    lisinopril-hydroCHLOROthiazide 20-12.5 MG Oral Tab Take 2 tablets by mouth daily. 180 tablet 3    tamsulosin 0.4 MG Oral Cap Take 1 capsule (0.4 mg total) by mouth daily. 90 capsule 1    Lovastatin 20 MG Oral Tab Take 1 tablet (20 mg total) by mouth nightly. 90 tablet 3    Ferrous Sulfate 325 (65 Fe) MG Oral Tab Take 1 tablet (325 mg total) by mouth daily with breakfast. 90 tablet 1    Fluticasone Propionate 50 MCG/ACT Nasal Suspension INHALE 1 SPRAY BY NASAL ROUTE DAILY 1 Bottle 0    aspirin 81 MG Oral Tab EC Take 1 tablet (81 mg total) by mouth daily.        Past Medical History:    Allergic rhinitis    Anemia    Anemia due to blood loss    Aspirin long-term use    Chronic low back pain    lumbar spine; now s/p lumbar fusion    Diabetes (HCC)    Diabetes mellitus, type  2 (pick complications) (HCC)    History of diverticulosis    Lipid screening    Other and unspecified hyperlipidemia    Other male erectile dysfunction    Rectal bleeding    hemorrhoidal bleeding    Rotator cuff tear, left    Unspecified essential hypertension      Past Surgical History:   Procedure Laterality Date    Appendectomy      Colonoscopy N/A 2021    Procedure: COLONOSCOPY;  Surgeon: Ulysses Harley MD;  Location: [facility] ENDOSCOPY    Laminectomy      Lumbar spine fusion combined  13    Repair rotator cuff,acute Left     Tonsillectomy        Social History:  Social History     Socioeconomic History    Marital status:     Number of children: 4   Tobacco Use    Smoking status: Former     Current packs/day: 0.00     Average packs/day: 1 pack/day for 20.0 years (20.0 ttl pk-yrs)     Types: Cigarettes     Start date: 1952     Quit date: 1972     Years since quittin.3    Smokeless tobacco: Never   Vaping Use    Vaping status: Never Used   Substance and Sexual Activity    Alcohol use: No     Comment: no alcohol use since     Drug use: No   Other Topics Concern    Caffeine Concern Yes   Social History Narrative    Alyssia is . Father of 5 adult children, multiple grandchildren and great grandchildren. Patient is retired from being a  for the [employer]. Patient lives alone in [place], [place]. Patient enjoys time with his dogs, fans of the [employer], and basketball.       Family History   Problem Relation Age of Onset    Heart Disease Father 70        CAD    Cancer Brother 83    Prostate Cancer Son 57    Cancer Brother 81        portion of bowel    Breast Cancer Neg     Pancreatic Cancer Neg     Ovarian Cancer Neg     Colon Cancer Neg       Allergies   Allergen Reactions    Eligard [Leuprolide] RASH     Rash with large sized blisters        REVIEW OF SYSTEMS:   Review of Systems   Review of Systems   Constitutional: Negative for activity change, appetite change and fever.    HENT: Negative for congestion and voice change.    Respiratory: Negative for cough and shortness of breath.    Cardiovascular: Negative for chest pain.   Gastrointestinal: Negative for abdominal distention, abdominal pain and vomiting.   Genitourinary: Negative for hematuria.   Skin: Negative for wound.   Psychiatric/Behavioral: Negative for behavioral problems.  Back pain present  Wt Readings from Last 5 Encounters:   03/20/24 158 lb (71.7 kg)   03/19/24 174 lb (78.9 kg)   03/12/24 165 lb (74.8 kg)   11/14/23 173 lb (78.5 kg)   08/10/23 170 lb (77.1 kg)     Body mass index is 24.75 kg/m².      EXAM:   /62 (BP Location: Right arm, Patient Position: Sitting, Cuff Size: large)   Pulse 73   Ht 5' 7\" (1.702 m)   BMI 24.75 kg/m²   Physical Exam   Constitutional:       Appearance: Normal appearance.   HENT:      Head: Normocephalic.   Eyes:      Conjunctiva/sclera: Conjunctivae normal.   Cardiovascular:      Rate and Rhythm: Normal rate and regular rhythm.      Heart sounds: Normal heart sounds. No murmur heard.  Bilateral barefoot skin diabetic exam is normal, visualized feet and the appearance is normal.  Bilateral monofilament/sensation of both feet is normal.  Pulsation pedal pulse exam of both lower legs/feet is normal as well.  Pulmonary:      Effort: Pulmonary effort is normal.      Breath sounds: Normal breath sounds. No rhonchi or rales.   Abdominal:      General: Bowel sounds are normal.      Palpations: Abdomen is soft.      Tenderness: There is no abdominal tenderness.   Musculoskeletal:      Cervical back: Neck supple.      Right lower leg: No edema.      Left lower leg: No edema.   Skin:     General: Skin is warm and dry.   Neurological:      General: No focal deficit present.      Mental Status: He is alert and oriented to person, place, and time. Mental status is at baseline.   Psychiatric:         Mood and Affect: Mood normal.         Behavior: Behavior normal.   No spinal tenderness or  paraspinal tenderness.    ASSESSMENT AND PLAN:   1. Hypertension, benign  Excellent blood pressure control.  Continue current medical regimen.  Continue to monitor kidney functions.    2. Type 2 diabetes mellitus without complication, without long-term current use of insulin (HCC)  A1c is good.  He completed ophthalmology evaluation.  He does not recall the name of the ophthalmologist.  He will reach out to their office later.  - metFORMIN  MG Oral Tablet 24 Hr; Take 1 tablet (500 mg total) by mouth daily.  Dispense: 90 tablet; Refill: 0  - Microalb/Creat Ratio, Random Urine; Future  - POC Glycohemoglobin [61894]  - Lipid Panel; Future  - Microalb/Creat Ratio, Random Urine    3. Mild dementia without behavioral disturbance, psychotic disturbance, mood disturbance, or anxiety, unspecified dementia type (HCC)  Started on Aricept.  If there is any side effects, they will contact me.  Check vitamin B12 level.  Neurology referral given.  - Vitamin B12; Future  - Neuro Referral - JEANINE ([place])    Plan: med prescribed, labs ordered, referral given.  He will contact me for ongoing care.      The patient indicates understanding of these issues and agrees to the plan.  No follow-ups on file.    This note was prepared using Dragon Medical voice recognition dictation software. As a result errors may occur. When identified these errors have been corrected. While every attempt is made to correct errors during dictation discrepancies may still exist.

## 2024-06-03 ENCOUNTER — TELEPHONE (OUTPATIENT)
Dept: INTERNAL MEDICINE CLINIC | Facility: CLINIC | Age: 89
End: 2024-06-03

## 2024-06-03 NOTE — TELEPHONE ENCOUNTER
Spoke to son, Maxwell, on release, verified patient's name and date of birth. Rn relayed that the current facility needs to initiate a transfer. There is a lot that goes into a transfer (bed availability, patient stability, etc). Rn recommended contacting [facility] nursing staff for patient so they can contact the appropriate people to facilitate this. He was agreeable.

## 2024-06-03 NOTE — TELEPHONE ENCOUNTER
Patient's son is calling back to advise PCP , the patient is at [facility] in [facility] and asking for guidance on how to have the patient transferred to [facility] .   Please advise.

## 2024-06-03 NOTE — TELEPHONE ENCOUNTER
Son came to office requesting to speak to pcp in regards pt  Pt passed out on Saturday and is currently at [facility] on [facility]  Son wants to speak to pcp since they want for pt to be transfer here  Please call back son

## 2024-06-07 RX ORDER — MIRABEGRON 50 MG/1
50 TABLET, FILM COATED, EXTENDED RELEASE ORAL EVERY MORNING
Qty: 30 TABLET | Refills: 0 | OUTPATIENT
Start: 2024-06-07

## 2024-06-10 ENCOUNTER — INITIAL APN SNF VISIT (OUTPATIENT)
Dept: INTERNAL MEDICINE CLINIC | Facility: SKILLED NURSING FACILITY | Age: 89
End: 2024-06-10

## 2024-06-10 VITALS
TEMPERATURE: 98 F | DIASTOLIC BLOOD PRESSURE: 81 MMHG | OXYGEN SATURATION: 97 % | HEART RATE: 79 BPM | RESPIRATION RATE: 18 BRPM | SYSTOLIC BLOOD PRESSURE: 174 MMHG

## 2024-06-10 DIAGNOSIS — D64.9 ANEMIA, UNSPECIFIED TYPE: ICD-10-CM

## 2024-06-10 DIAGNOSIS — C61 PROSTATE CANCER (HCC): ICD-10-CM

## 2024-06-10 DIAGNOSIS — R55 SYNCOPE, UNSPECIFIED SYNCOPE TYPE: ICD-10-CM

## 2024-06-10 DIAGNOSIS — E11.69 TYPE 2 DIABETES MELLITUS WITH OTHER SPECIFIED COMPLICATION, WITHOUT LONG-TERM CURRENT USE OF INSULIN (HCC): ICD-10-CM

## 2024-06-10 DIAGNOSIS — N17.9 AKI (ACUTE KIDNEY INJURY) (HCC): ICD-10-CM

## 2024-06-10 DIAGNOSIS — F03.90 DEMENTIA WITHOUT BEHAVIORAL DISTURBANCE, PSYCHOTIC DISTURBANCE, MOOD DISTURBANCE, OR ANXIETY, UNSPECIFIED DEMENTIA SEVERITY, UNSPECIFIED DEMENTIA TYPE (HCC): ICD-10-CM

## 2024-06-10 DIAGNOSIS — R53.81 PHYSICAL DECONDITIONING: ICD-10-CM

## 2024-06-10 DIAGNOSIS — I10 HYPERTENSION, UNSPECIFIED TYPE: ICD-10-CM

## 2024-06-10 DIAGNOSIS — K21.9 GASTROESOPHAGEAL REFLUX DISEASE WITHOUT ESOPHAGITIS: ICD-10-CM

## 2024-06-10 DIAGNOSIS — E11.9 TYPE 2 DIABETES MELLITUS WITHOUT COMPLICATION, WITHOUT LONG-TERM CURRENT USE OF INSULIN (HCC): ICD-10-CM

## 2024-06-10 DIAGNOSIS — N40.0 BENIGN PROSTATIC HYPERPLASIA WITHOUT LOWER URINARY TRACT SYMPTOMS: Primary | ICD-10-CM

## 2024-06-10 PROCEDURE — 3079F DIAST BP 80-89 MM HG: CPT

## 2024-06-10 PROCEDURE — 1123F ACP DISCUSS/DSCN MKR DOCD: CPT

## 2024-06-10 PROCEDURE — 99306 1ST NF CARE HIGH MDM 50: CPT

## 2024-06-10 PROCEDURE — 3077F SYST BP >= 140 MM HG: CPT

## 2024-06-10 NOTE — PROGRESS NOTES
[facility]  [facility]  : 3/20/1935  Age 89 year old  male patient is admitted to [facility] for rehabilitation and strengthening.      [facility] Admission     Reason for visit: Follow up on LUZ MARIA and syncopy     HPI  This is an 88 yo M PMH of HTN, DM2, Dementia, GERD, prostate cancer s/p chemo who presented to [facility] with acute kidney injury and syncope. Per hospital records, pt was at his normal state of health when he had an episode of syncope at a barbershop, there was no associated tonic clonic movement. On presentation in hospital pt was somnolent and unable to give proper history.  Syncope - vasovagal likely. LBBB noted on EKG, no prior EKGs to compare. Pt has improved and no further issues. VSS. TTE consistent with LBBB. Orthostat neg, CT brain negative. Acute kidney injury resolved in hospital. PT was stabilized and transferred to [facility] for rehab and strengthening.     Pt seen and examined as initial APRN Visit. Pt is resting in bed, appears comfortable in NAD. Reports feeling well. Denies chest pain or sob. Wound nurse noted sacral cyst and recommended derm referral. Small cyst to the left sacral area noted, without drainage, redness or inflammation. Pt states this cyst has been present there for years, denies pain or any issues associated with it. Pt denies appetite change, n/v or abdominal pain. States he wants to be set up for his lunch soon. Looks forward to PT sessions. Denies constipation, diarrhea or bladder issues. No other issues.concerns. vss     Past Medical History:    Allergic rhinitis    Anemia    Anemia due to blood loss    Aspirin long-term use    Chronic low back pain    lumbar spine; now s/p lumbar fusion    Diabetes (HCC)    Diabetes mellitus, type 2 (pick complications) ([facility])    History of diverticulosis    Lipid screening    Other and unspecified hyperlipidemia    Other male erectile dysfunction    Rectal bleeding    hemorrhoidal bleeding    Rotator cuff tear,  left    Unspecified essential hypertension     Past Surgical History:   Procedure Laterality Date    Appendectomy      Colonoscopy N/A 2021    Procedure: COLONOSCOPY;  Surgeon: Ulysses Harley MD;  Location: [facility] ENDOSCOPY    Laminectomy      Lumbar spine fusion combined  13    Repair rotator cuff,acute Left 1993    Tonsillectomy       Family History   Problem Relation Age of Onset    Heart Disease Father 70        CAD    Cancer Brother 83    Prostate Cancer Son 57    Cancer Brother 81        portion of bowel    Breast Cancer Neg     Pancreatic Cancer Neg     Ovarian Cancer Neg     Colon Cancer Neg      Social History     Socioeconomic History    Marital status:     Number of children: 4   Tobacco Use    Smoking status: Former     Current packs/day: 0.00     Average packs/day: 1 pack/day for 20.0 years (20.0 ttl pk-yrs)     Types: Cigarettes     Start date: 1952     Quit date: 1972     Years since quittin.4    Smokeless tobacco: Never   Vaping Use    Vaping status: Never Used   Substance and Sexual Activity    Alcohol use: No     Comment: no alcohol use since     Drug use: No   Other Topics Concern    Caffeine Concern Yes   Social History Narrative    Alyssia is . Father of 5 adult children, multiple grandchildren and great grandchildren. Patient is retired from being a  for the [employer]. Patient lives alone in [place], [place]. Patient enjoys time with his dogs, fans of the [id number], and basketball.        IMMUNIZATIONS  Immunization History   Administered Date(s) Administered    Covid-19 Vaccine Moderna 100 mcg/0.5 ml 2021, 2021    FLU VAC High Dose 65 YRS & Older PRSV Free (46359) 2014, 2015, 10/08/2016, 10/10/2019, 2020, 2021, 2022    FLUAD High Dose 65 yr and older (65276) 2017, 10/11/2018    FLUZONE Quad Multidose 6-35 Mos (49981) 2011    Fluzone Vaccine Medicare () 2014, 2015, 10/08/2016,  10/10/2019, 09/24/2020    Influenza 10/21/2006, 10/08/2007, 10/10/2009, 10/15/2010, 11/06/2012, 10/23/2013    Pneumococcal (Prevnar 13) 11/16/2017    Pneumovax 23 09/09/2019        ALLERGIES:  Allergies   Allergen Reactions    Eligard [Leuprolide] RASH     Rash with large sized blisters       CODE STATUS:  Full Code    ADVANCED CARE PLANNING TEAM: Will need family care plan     CURRENT MEDICATIONS - reviewed and updated on SNF EMAR    SUBJECTIVE    Pt seen and examined as initial APRN Visit. Pt is resting in bed, appears comfortable in NAD. Reports feeling well. Denies chest pain or sob. Wound nurse noted sacral cyst and recommended derm referral. Small cyst to the left sacral area noted, without drainage, redness or inflammation. Pt states this cyst has been present there for years, denies pain or any issues associated with it. Pt denies appetite change, n/v or abdominal pain. States he wants to be set up for his lunch soon. Looks forward to PT sessions. Denies constipation, diarrhea or bladder issues. No other issues.concerns. vss     PHYSICAL EXAM:  Vitals:    06/10/24 1256   BP: (!) 174/81   Pulse: 79   Resp: 18   Temp: 97.8 °F (36.6 °C)   SpO2: 97%      GENERAL HEALTH:well developed, well nourished, in no apparent distress   LINES, TUBES, DRAINS:  none  SKIN: small cyst to the left sacral area, no drainage or erythema noted. Nontender   WOUND: see wound assessment,   EYES: PERRLA, EOMI, sclera anicteric, conjunctiva normal; there is no nystagmus, no drainage from eyes  HENT: normocephalic; normal nose, no nasal drainage, mucous membranes pink, moist, pharynx no exudate, no visible cerumen.   NECK:supple, FROM; no JVD, no TMG, no carotid bruits   BREAST: deferred exam   RESPIRATORY:clear to percussion and auscultation  CARDIOVASCULAR: S1, S2 normal, RRR; no S3, no S4  ABDOMEN:  normal active BS+, soft, nondistended; no organomegaly, no masses; no bruits; nontender, no guarding, no rebound tenderness.  :no  suprapubic distension  LYMPHATIC:no lymphedema  MUSCULOSKELETAL: no acute synovitis upper or lower extremity   EXTREMITIES/VASCULAR:no cyanosis, clubbing or edema  NEUROLOGIC:intact; no sensorimotor deficit  PSYCHIATRIC: Intermittently confused. Affect appropriate, cooperative     MEDICAL DECISION MAKING  Unclear     DIAGNOSTICS REVIEWED AT THIS VISIT:  [facility] medical records    SEE PLAN BELOW    Syncope  - Per hospital records, pt was at his normal state of health when he had an episode of syncope at a [facility], there was no associated tonic clonic movement. On presentation in hospital pt was somnolent and unable to give proper history.  - Per hospital records vasovagal likely   - LBBB noted on EKG, however, no prior EKG to compare.   - Orthostatic BPs negative   - CT brain negative   - Pt improved with no further issues  - PT/OT   - monitor     LUZ MARIA  - Resolved in hospital   - Last Bun and creat at 21 and 1.21 (06/03/24)  - weekly labs in rehab   - monitor     Prostate CA   BPH  - s/p chemo  - continue flomax 0.4 mg daily   - continue Mirabegron 50 mg daily   - Follows with Dr. Viktor Bloom, heme onc   - monitor     HTN  - continue lisinopril 20 mg daily     DM type 2  - Last A1c value was 6.6% done 5/7/2024.  - continue metformin 500 mg bid   - weekly labs in rehab    Dementia   - continue Aricept 5 mg hs     GERD   - continue protonix 40 mg daily     Chronic anemia   - last hgb at 9.4  - monitor po intake   - dietary consult   - start ferrous sulfate 325 mg daily   - weekly labs in rehab     Physical Deconditioning/Impaired mobility and ADLs/At risk for falling  - Fall Precautions  - PT/OT/ST to evaluate and treat  - [facility] team to establish care plan meeting with patient and POA/family as appropriate  - Anticipate DC on or before TBD; SW to assist patient/family w/ DC planning  - DC Plan:  TBD     Bowel regimen  - Monitor BM status   - colace 100 mg bid     DVT Prophylaxis   - Encourage early mobilization and  participation in PT/OT as able    Pain Management  - Offer to pre-medicate 30-60 min prior to therapy  - Physiatry evaluation with management appreciated  - Acetaminophen 650 mg every 6 hrs prn      Vitamins/supplements as r/t deficiencies  - [facility] RD to follow while in rehab; supplementation/diet as per [facility] RD  - May continue home supplements  - ferrous sulfate 325 mg daily     LABS  - CBC/CMP weekly while in [facility]    FOLLOW UP APPOINTMENTS  Future Appointments   Date Time Provider Department Center   9/3/2024 11:15 AM Ayden Samano MD [facility]   9/10/2024 10:30 AM CMA RESOURCE [facility] HEM ONC [facility]   9/10/2024 11:00 AM Viktor Bloom MD [facility] HEM ONC [facility]        60 minutes spent w/ patient and staff, including but not limited to reviewing present status, needs, abilities with disciplines, reviewing medical records, vital signs, labs, completing med reconciliation and entering orders for continued care in [facility]      Note to patient: The 21st Century Cures Act makes medical notes like these available to patients in the interest of transparency. However, this is a medical document intended as peer to peer communication. It is written in medical language and may contain abbreviations or verbiage that are unfamiliar. It may appear blunt or direct. Medical documents are intended to carry relevant information, facts as evident, and the clinical opinion of the practitioner who signs the document.     Ludy Vargas, APRN    06/10/24

## 2024-06-14 ENCOUNTER — SNF VISIT (OUTPATIENT)
Dept: INTERNAL MEDICINE CLINIC | Facility: SKILLED NURSING FACILITY | Age: 89
End: 2024-06-14

## 2024-06-14 VITALS
SYSTOLIC BLOOD PRESSURE: 155 MMHG | RESPIRATION RATE: 16 BRPM | HEART RATE: 73 BPM | TEMPERATURE: 98 F | OXYGEN SATURATION: 95 % | DIASTOLIC BLOOD PRESSURE: 85 MMHG

## 2024-06-14 DIAGNOSIS — R55 SYNCOPE, UNSPECIFIED SYNCOPE TYPE: ICD-10-CM

## 2024-06-14 DIAGNOSIS — C61 PROSTATE CANCER (HCC): ICD-10-CM

## 2024-06-14 DIAGNOSIS — N40.0 BENIGN PROSTATIC HYPERPLASIA WITHOUT LOWER URINARY TRACT SYMPTOMS: Primary | ICD-10-CM

## 2024-06-14 DIAGNOSIS — E78.5 HYPERLIPIDEMIA, UNSPECIFIED HYPERLIPIDEMIA TYPE: ICD-10-CM

## 2024-06-14 DIAGNOSIS — E11.69 TYPE 2 DIABETES MELLITUS WITH OTHER SPECIFIED COMPLICATION, WITHOUT LONG-TERM CURRENT USE OF INSULIN (HCC): ICD-10-CM

## 2024-06-14 DIAGNOSIS — I10 HYPERTENSION, BENIGN: ICD-10-CM

## 2024-06-14 DIAGNOSIS — N18.30 STAGE 3 CHRONIC KIDNEY DISEASE, UNSPECIFIED WHETHER STAGE 3A OR 3B CKD (HCC): Chronic | ICD-10-CM

## 2024-06-14 NOTE — PROGRESS NOTES
[facility] Roverto  : 3/20/1935  Age 89 year old  male patient is admitted to [facility] for rehabilitation and strengthening.       [facility] Admission      Reason for visit: Follow up on LUZ MARIA and syncopy      HPI  This is an 88 yo M PMH of HTN, DM2, Dementia, GERD, prostate cancer s/p chemo who presented to [facility] with acute kidney injury and syncope. Per hospital records, pt was at his normal state of health when he had an episode of syncope at a barbershop, there was no associated tonic clonic movement. On presentation in hospital pt was somnolent and unable to give proper history.  Syncope - vasovagal likely. LBBB noted on EKG, no prior EKGs to compare. Pt has improved and no further issues. VSS. TTE consistent with LBBB. Orthostat neg, CT brain negative. Acute kidney injury resolved in hospital. PT was stabilized and transferred to [facility] for rehab and strengthening.    Pt seen and examined in follow up. Pt is sitting up in chair, appears comfortable in NAD. Reports feeling well. Recently returned from PT. He's progressing in PT daily. Denies having pain. Denies chest pain or sob. No appetite change, n/v or abdominal pain. Denies constipation, diarrhea or bladder issues. No other issues/concerns. Vss     ALLERGIES:  Allergies   Allergen Reactions    Eligard [Leuprolide] RASH     Rash with large sized blisters       IMMUNIZATIONS  Immunization History   Administered Date(s) Administered    Covid-19 Vaccine Moderna 100 mcg/0.5 ml 2021, 2021    FLU VAC High Dose 65 YRS & Older PRSV Free (01332) 2014, 2015, 10/08/2016, 10/10/2019, 2020, 2021, 2022    FLUAD High Dose 65 yr and older (54882) 2017, 10/11/2018    FLUZONE Quad Multidose 6-35 Mos (80480) 2011    Fluzone Vaccine Medicare () 2014, 2015, 10/08/2016, 10/10/2019, 2020    Influenza 10/21/2006, 10/08/2007, 10/10/2009, 10/15/2010, 2012, 10/23/2013     Pneumococcal (Prevnar 13) 11/16/2017    Pneumovax 23 09/09/2019        CODE STATUS:  Full Code    ADVANCED CARE PLANNING TEAM: Will need family care plan    CURRENT MEDICATIONS - reviewed and updated on SNF EMR     SUBJECTIVE    Pt seen and examined in follow up. Pt is sitting up in chair, appears comfortable in NAD. Reports feeling well. Recently returned from PT. He's progressing in PT daily. Denies having pain. Denies chest pain or sob. No appetite change, n/v or abdominal pain. Denies constipation, diarrhea or bladder issues. No other issues/concerns. Vss     PHYSICAL EXAM:  Vitals:    06/14/24 1215   BP: 155/85   Pulse: 73   Resp: 16   Temp: 98 °F (36.7 °C)   SpO2: 95%      GENERAL HEALTH:well developed, well nourished, in no apparent distress   LINES, TUBES, DRAINS:  none  SKIN: small cyst to the left sacral area, no drainage or erythema noted. Nontender   WOUND: see wound assessment,   EYES: PERRLA, EOMI, sclera anicteric, conjunctiva normal; there is no nystagmus, no drainage from eyes  HENT: normocephalic; normal nose, no nasal drainage, mucous membranes pink, moist, pharynx no exudate, no visible cerumen.   NECK:supple, FROM; no JVD, no TMG, no carotid bruits   BREAST: deferred exam   RESPIRATORY:clear to percussion and auscultation  CARDIOVASCULAR: S1, S2 normal, RRR; no S3, no S4  ABDOMEN:  normal active BS+, soft, nondistended; no organomegaly, no masses; no bruits; nontender, no guarding, no rebound tenderness.  :no suprapubic distension  LYMPHATIC:no lymphedema  MUSCULOSKELETAL: no acute synovitis upper or lower extremity   EXTREMITIES/VASCULAR:no cyanosis, clubbing or edema  NEUROLOGIC:intact; no sensorimotor deficit  PSYCHIATRIC: Intermittently confused. Affect appropriate, cooperative    DIAGNOSTICS REVIEWED AT THIS VISIT:  Labs 06/13/24: wbc 4.16, hgb 10.2, plt 231, gluc 82, na 144, k 4.2, bun 19, creat 0.84    SEE PLAN BELOW    Syncope  - Per hospital records, pt was at his normal state of  health when he had an episode of syncope at a [facility], there was no associated tonic clonic movement. On presentation in hospital pt was somnolent and unable to give proper history.  - Per hospital records vasovagal likely   - LBBB noted on EKG, however, no prior EKG to compare.   - Orthostatic BPs negative   - CT brain negative   - Pt improved with no further issues  - PT/OT   - monitor      LUZ MARIA  - Resolved in hospital   - Last Bun and creat at 21 and 1.21 (06/03/24)  - weekly labs in rehab   - monitor      Prostate CA   BPH  - s/p chemo  - continue flomax 0.4 mg daily   - continue Mirabegron 50 mg daily   - Follows with Dr. Viktor Bloom, heme onc   - monitor      HTN  - continue lisinopril 20 mg daily      DM type 2  - Last A1c value was 6.6% done 5/7/2024.  - continue metformin 500 mg bid   - weekly labs in rehab     Dementia   - continue Aricept 5 mg hs      GERD   - continue protonix 40 mg daily      Chronic anemia   - last hgb at 9.4  - monitor po intake   - dietary consult   - start ferrous sulfate 325 mg daily   - weekly labs in rehab      Physical Deconditioning/Impaired mobility and ADLs/At risk for falling  - Fall Precautions  - PT/OT/ST to evaluate and treat  - [facility] team to establish care plan meeting with patient and POA/family as appropriate  - Anticipate DC on or before TBD; SW to assist patient/family w/ DC planning  - DC Plan:  TBD     Bowel regimen  - Monitor BM status   - colace 100 mg bid      DVT Prophylaxis   - Encourage early mobilization and participation in PT/OT as able     Pain Management  - Offer to pre-medicate 30-60 min prior to therapy  - Physiatry evaluation with management appreciated  - Acetaminophen 650 mg every 6 hrs prn      Vitamins/supplements as r/t deficiencies  - [facility] RD to follow while in rehab; supplementation/diet as per [facility] RD  - May continue home supplements  - ferrous sulfate 325 mg daily      LABS  - CBC/CMP weekly while in [facility]    FOLLOW UP APPOINTMENTS  Future  Appointments   Date Time Provider Department Center   9/3/2024 11:15 AM Ayden Samano MD [facility]   9/10/2024 10:30 AM [facility] HEM ONC EMO   9/10/2024 11:00 AM Viktor Bloom MD [facility] HEM ONC EMO        35 minutes spent w/ patient and staff, including but not limited to/ reviewing present status, needs, abilities with disciplines, reviewing medical records, vital signs, labs, completing medication reconciliation and entering orders for continued care in [facility]     Note to patient: The 21st Century Cures Act makes medical notes like these available to patients in the interest of transparency. However, this is a medical document intended as peer to peer communication. It is written in medical language and may contain abbreviations or verbiage that are unfamiliar. It may appear blunt or direct. Medical documents are intended to carry relevant information, facts as evident, and the clinical opinion of the practitioner who signs the document.     Ludy Vargas, APRN   06/14/24

## 2024-06-18 ENCOUNTER — SNF VISIT (OUTPATIENT)
Dept: INTERNAL MEDICINE CLINIC | Facility: SKILLED NURSING FACILITY | Age: 89
End: 2024-06-18

## 2024-06-18 VITALS
BODY MASS INDEX: 22 KG/M2 | HEART RATE: 77 BPM | WEIGHT: 143 LBS | RESPIRATION RATE: 18 BRPM | SYSTOLIC BLOOD PRESSURE: 143 MMHG | DIASTOLIC BLOOD PRESSURE: 80 MMHG | OXYGEN SATURATION: 98 % | TEMPERATURE: 98 F

## 2024-06-18 DIAGNOSIS — F03.90 DEMENTIA, UNSPECIFIED DEMENTIA SEVERITY, UNSPECIFIED DEMENTIA TYPE, UNSPECIFIED WHETHER BEHAVIORAL, PSYCHOTIC, OR MOOD DISTURBANCE OR ANXIETY (HCC): ICD-10-CM

## 2024-06-18 DIAGNOSIS — R55 SYNCOPE, UNSPECIFIED SYNCOPE TYPE: ICD-10-CM

## 2024-06-18 DIAGNOSIS — N17.9 AKI (ACUTE KIDNEY INJURY) (HCC): ICD-10-CM

## 2024-06-18 DIAGNOSIS — R53.1 GENERALIZED WEAKNESS: ICD-10-CM

## 2024-06-18 DIAGNOSIS — C61 PROSTATE CANCER (HCC): Primary | ICD-10-CM

## 2024-06-18 PROCEDURE — 1126F AMNT PAIN NOTED NONE PRSNT: CPT | Performed by: NURSE PRACTITIONER

## 2024-06-18 PROCEDURE — 3079F DIAST BP 80-89 MM HG: CPT | Performed by: NURSE PRACTITIONER

## 2024-06-18 PROCEDURE — 3077F SYST BP >= 140 MM HG: CPT | Performed by: NURSE PRACTITIONER

## 2024-06-18 PROCEDURE — 99309 SBSQ NF CARE MODERATE MDM 30: CPT | Performed by: NURSE PRACTITIONER

## 2024-06-18 NOTE — PROGRESS NOTES
Leonard  Roverto  : 3/20/1935  Age 89 year old  male patient is admitted to   [facility] for rehabilitation and strengthening.       [facility]  Admission      Reason for visit: Follow up on LUZ MARIA and syncope , prostate cancer      HPI  This is an 89 yo M PMH of HTN, DM2, Dementia, GERD, prostate cancer s/p chemo who presented to [facility] with acute kidney injury and syncope. Per hospital records, pt was at his normal state of health when he had an episode of syncope at a barbershop, there was no associated tonic clonic movement. On presentation in hospital pt was somnolent and unable to give proper history.  Syncope - vasovagal likely. LBBB noted on EKG, no prior EKGs to compare. Pt has improved and no further issues. VSS. TTE consistent with LBBB. Orthostat neg, CT brain negative. Acute kidney injury resolved in hospital. PT was stabilized and transferred to [facility] for rehab and strengthening.     Pt seen and examined in follow up. VSS. Pt is sitting up in chair, appears comfortable in NAD. Reports feeling well. Recently returned from PT. He's progressing in PT daily. Denies having pain. Denies chest pain or sob. No appetite change, n/v or abdominal pain. Denies constipation, diarrhea or bladder issues. No syncope issues noted in rehab .  No other issues/concerns.      CODE STATUS:  Full Code    ADVANCED CARE PLANNING TEAM: Will need family care plan      CURRENT MEDICATIONS - reviewed and updated     Current Outpatient Medications   Medication Sig Dispense Refill    amLODIPine 5 MG Oral Tab Take 1 tablet (5 mg total) by mouth daily. 90 tablet 3    gabapentin 300 MG Oral Cap Take 2 capsules (600 mg total) by mouth nightly. 180 capsule 3    metFORMIN  MG Oral Tablet 24 Hr Take 1 tablet (500 mg total) by mouth daily. 90 tablet 0    Mirabegron ER (MYRBETRIQ) 50 MG Oral Tablet 24 Hr Take 1 tablet (50 mg total) by mouth daily. 30 tablet 0    pantoprazole 40 MG Oral Tab EC Take 1  tablet (40 mg total) by mouth before breakfast. 90 tablet 0    donepezil (ARICEPT) 5 MG Oral Tab Take 1 tablet (5 mg total) by mouth nightly. 90 tablet 1    lisinopril-hydroCHLOROthiazide 20-12.5 MG Oral Tab Take 2 tablets by mouth daily. 180 tablet 3    tamsulosin 0.4 MG Oral Cap Take 1 capsule (0.4 mg total) by mouth daily. 90 capsule 1    Lovastatin 20 MG Oral Tab Take 1 tablet (20 mg total) by mouth nightly. 90 tablet 3    Ferrous Sulfate 325 (65 Fe) MG Oral Tab Take 1 tablet (325 mg total) by mouth daily with breakfast. 90 tablet 1    Fluticasone Propionate 50 MCG/ACT Nasal Suspension INHALE 1 SPRAY BY NASAL ROUTE DAILY 1 Bottle 0    aspirin 81 MG Oral Tab EC Take 1 tablet (81 mg total) by mouth daily.         VITALS:  /80   Pulse 77   Temp 97.5 °F (36.4 °C)   Resp 18   Wt 143 lb (64.9 kg)   SpO2 98%   BMI 22.40 kg/m²       REVIEW OF SYSTEMS:  Pt seen and examined in follow up. VSS . Pt is sitting up in chair, appears comfortable in NAD. Reports feeling well. Reports participating in PT, improving . Per patient  He's progressing in PT daily. Denies having pain. Denies chest pain or sob. No appetite change, n/v or abdominal pain. Denies constipation, diarrhea or bladder issues. No other issues/concerns.   No syncope episodes  in rehab reported .     PHYSICAL EXAM:        GENERAL HEALTH:well developed, well nourished, in no apparent distress   LINES, TUBES, DRAINS:  none  SKIN: small cyst to the left sacral area, no drainage or erythema noted. Nontender   WOUND: see wound assessment,   EYES: PERRLA, EOMI, sclera anicteric, conjunctiva normal; there is no nystagmus, no drainage from eyes  HENT: normocephalic; normal nose, no nasal drainage, mucous membranes pink, moist, pharynx no exudate, no visible cerumen.   NECK:supple, FROM; no JVD, no TMG, no carotid bruits   BREAST: deferred exam   RESPIRATORY:clear to percussion and auscultation  CARDIOVASCULAR: S1, S2 normal, RRR; no S3, no S4  ABDOMEN:   normal active BS+, soft, nondistended; no organomegaly, no masses; no bruits; nontender, no guarding, no rebound tenderness.  :no suprapubic distension  LYMPHATIC:no lymphedema  MUSCULOSKELETAL: no acute synovitis upper or lower extremity   EXTREMITIES/VASCULAR:no cyanosis, clubbing or edema  NEUROLOGIC:intact; no sensorimotor deficit  PSYCHIATRIC: Intermittently confused. Affect appropriate, cooperative     SEE PLAN BELOW     Syncope  - Per hospital records, pt was at his normal state of health when he had an episode of syncope at a [facility], there was no associated tonic clonic movement. On presentation in hospital pt was somnolent and unable to give proper history.  - Per hospital records vasovagal likely   - LBBB noted on EKG, however, no prior EKG to compare.   - Orthostatic BPs negative   - CT brain negative   - Pt improved with no further issues  -no syncope episodes noted in therapy   - PT/OT   - monitor      LUZ MARIA  - Resolved in hospital   - Last Bun and creat at 21 and 1.21 (06/03/24)  - weekly labs in rehab   - monitor      Prostate CA   BPH  - s/p chemo  - continue flomax 0.4 mg daily   - continue Mirabegron 50 mg daily   - Follows with Dr. Viktor Bloom, heme onc , next f/u scheduled , see below   - monitor      HTN  -vs q shift   - continue lisinopril 20 mg daily      DM type 2  - Last A1c value was 6.6% done 5/7/2024.  - continue metformin 500 mg bid   -accu check bid, 110's   - weekly labs in rehab     Dementia   - continue Aricept 5 mg hs      GERD   - continue protonix 40 mg daily      Chronic anemia   - last hgb at 10.2   - monitor po intake   - dietary consult   - start ferrous sulfate 325 mg daily   - weekly labs in rehab, stable       Physical Deconditioning/Impaired mobility and ADLs/At risk for falling  - Fall Precautions  - PT/OT/ST to evaluate and treat  - [facility] team to establish care plan meeting with patient and POA/family as appropriate  - Anticipate DC on or before TBD; SW to assist  patient/family w/ DC planning  - DC Plan:  TBD     Bowel regimen  - Monitor BM status   - colace 100 mg bid      DVT Prophylaxis   - Encourage early mobilization and participation in PT/OT as able     Pain Management  - Offer to pre-medicate 30-60 min prior to therapy  - Physiatry evaluation with management appreciated  - Acetaminophen 650 mg every 6 hrs prn      Vitamins/supplements as r/t deficiencies  - [facility] RD to follow while in rehab; supplementation/diet as per [facility] RD  - May continue home supplements  - ferrous sulfate 325 mg daily      LABS  - CBC/CMP weekly while in [facility]     FOLLOW UP APPOINTMENTS         Future Appointments   Date Time Provider Department Center   9/3/2024 11:15 AM Ayden Samano MD [facility]   9/10/2024 10:30 AM CMA RESOURCE [facility] HEM ONC EMO   9/10/2024 11:00 AM Viktor Bloom MD [facility] HEM ONC EMO      This is a 25 minute visit and greater than 50% of the time was spent counseling the patient and/or coordinating care.    Hanh Sánchez, PRASHANTH  06/18/24   4:26 PM

## 2024-06-28 ENCOUNTER — SNF DISCHARGE (OUTPATIENT)
Dept: INTERNAL MEDICINE CLINIC | Facility: SKILLED NURSING FACILITY | Age: 89
End: 2024-06-28

## 2024-06-28 VITALS
OXYGEN SATURATION: 97 % | RESPIRATION RATE: 18 BRPM | HEART RATE: 71 BPM | DIASTOLIC BLOOD PRESSURE: 75 MMHG | SYSTOLIC BLOOD PRESSURE: 146 MMHG | TEMPERATURE: 98 F

## 2024-06-28 DIAGNOSIS — N18.30 STAGE 3 CHRONIC KIDNEY DISEASE, UNSPECIFIED WHETHER STAGE 3A OR 3B CKD (HCC): Chronic | ICD-10-CM

## 2024-06-28 DIAGNOSIS — I10 HYPERTENSION, BENIGN: ICD-10-CM

## 2024-06-28 DIAGNOSIS — E11.69 TYPE 2 DIABETES MELLITUS WITH OTHER SPECIFIED COMPLICATION, WITHOUT LONG-TERM CURRENT USE OF INSULIN (HCC): ICD-10-CM

## 2024-06-28 DIAGNOSIS — E78.5 HYPERLIPIDEMIA, UNSPECIFIED HYPERLIPIDEMIA TYPE: ICD-10-CM

## 2024-06-28 DIAGNOSIS — R55 SYNCOPE, UNSPECIFIED SYNCOPE TYPE: Primary | ICD-10-CM

## 2024-06-28 DIAGNOSIS — N17.9 AKI (ACUTE KIDNEY INJURY) (HCC): ICD-10-CM

## 2024-06-28 DIAGNOSIS — C61 PROSTATE CA (HCC): ICD-10-CM

## 2024-06-28 PROCEDURE — 99316 NF DSCHRG MGMT 30 MIN+: CPT

## 2024-06-28 PROCEDURE — 3077F SYST BP >= 140 MM HG: CPT

## 2024-06-28 PROCEDURE — 3078F DIAST BP <80 MM HG: CPT

## 2024-06-28 NOTE — PROGRESS NOTES
Leonard Shay, 3/20/1935, 89 year old, male is being discharged from [facility] to home      DISCHARGE SUMMARY    Date of Admission to [facility]: 06/06/24    Date of Discharge from [facility]:  07/01/24                               Admitting Diagnoses: LUZ MARIA and syncopy     Reason for Admission to [facility]: Rehabilitation and strengthening      PHYSICAL EXAM:  Vitals:    06/28/24 1300   BP: 146/75   Pulse: 71   Resp: 18   Temp: 97.6 °F (36.4 °C)   SpO2: 97%      GENERAL HEALTH:well developed, well nourished, in no apparent distress   LINES, TUBES, DRAINS:  none  SKIN: small cyst to the left sacral area, no drainage or erythema noted. Nontender   WOUND: see wound assessment,   EYES: PERRLA, EOMI, sclera anicteric, conjunctiva normal; there is no nystagmus, no drainage from eyes  HENT: normocephalic; normal nose, no nasal drainage, mucous membranes pink, moist, pharynx no exudate, no visible cerumen.   NECK:supple, FROM; no JVD, no TMG, no carotid bruits   BREAST: deferred exam   RESPIRATORY:clear to percussion and auscultation  CARDIOVASCULAR: S1, S2 normal, RRR; no S3, no S4  ABDOMEN:  normal active BS+, soft, nondistended; no organomegaly, no masses; no bruits; nontender, no guarding, no rebound tenderness.  :no suprapubic distension  LYMPHATIC:no lymphedema  MUSCULOSKELETAL: no acute synovitis upper or lower extremity   EXTREMITIES/VASCULAR:no cyanosis, clubbing or edema  NEUROLOGIC:intact; no sensorimotor deficit  PSYCHIATRIC: Intermittently confused. Affect appropriate, cooperative     CURRENT MANAGEMENT AT [facility]     Syncope  - Per hospital records, pt was at his normal state of health when [facility] there was no associated tonic clonic movement. On presentation in hospital pt was somnolent and unable to give proper history.  - Per hospital records vasovagal likely   - LBBB noted on EKG, however, no prior EKG to compare.   - Orthostatic BPs  negative   - CT brain negative   - Pt improved with no further issues  - PT/OT   - monitor      LUZ MARIA  - Resolved in hospital   - Last Bun and creat at 21 and 1.21 (06/03/24)  - weekly labs in rehab   - monitor      Prostate CA   BPH  - s/p chemo  - continue flomax 0.4 mg daily   - continue Mirabegron 50 mg daily   - Follows with Dr. Viktor Bloom, heme onc   - monitor      HTN  - continue lisinopril 20 mg daily      DM type 2  - Last A1c value was 6.6% done 5/7/2024.  - continue metformin 500 mg bid   - weekly labs in rehab     Dementia   - continue Aricept 5 mg hs      GERD   - continue protonix 40 mg daily      Chronic anemia   - last hgb at 9.4  - monitor po intake   - dietary consult   - start ferrous sulfate 325 mg daily   - weekly labs in rehab      Physical Deconditioning/Impaired mobility and ADLs/At risk for falling  - Fall Precautions  - PT/OT/ST to evaluate and treat  - [facility] team to establish care plan meeting with patient and POA/family as appropriate  - Anticipate DC on or before 07/01/24; SW to assist patient/family w/ DC planning  - DC Plan:  07/01/24 home with hh and caregiver      Bowel regimen  - Monitor BM status   - colace 100 mg bid      DVT Prophylaxis   - Encourage early mobilization and participation in PT/OT as able     Pain Management  - Offer to pre-medicate 30-60 min prior to therapy  - Physiatry evaluation with management appreciated  - Acetaminophen 650 mg every 6 hrs prn      Vitamins/supplements as r/t deficiencies  - [facility] RD to follow while in rehab; supplementation/diet as per [facility] RD  - May continue home supplements  - ferrous sulfate 325 mg daily      LABS  - CBC/CMP weekly while in [facility]    Medication Reconciliation Completed:  Yes - All medications and side effects reviewed with patient    FOLLOW UP APPOINTMENTS  Future Appointments   Date Time Provider Department Center   9/3/2024 11:15 AM Ayden Samano MD [facility]   9/10/2024 10:30 AM [facility] HEM ONC [facility]    9/10/2024 11:00 AM Viktor Bloom MD [facility] HEM ONC [facility]      60 minutes spent w/ patient and staff, including but not limited to reviewing present status, needs, abilities with disciplines, reviewing medical records, vital signs, labs, completing med reconciliation and entering orders for continued care in [facility]  and at discharge     Note to patient: The 21st Century Cures Act makes medical notes like these available to patients in the interest of transparency. However, this is a medical document intended as peer to peer communication. It is written in medical language and may contain abbreviations or verbiage that are unfamiliar. It may appear blunt or direct. Medical documents are intended to carry relevant information, facts as evident, and the clinical opinion of the practitioner who signs the document.     Ludy Vargas, APRN   6/28/2024

## 2024-07-02 ENCOUNTER — TELEPHONE (OUTPATIENT)
Dept: INTERNAL MEDICINE CLINIC | Facility: CLINIC | Age: 89
End: 2024-07-02

## 2024-07-02 NOTE — TELEPHONE ENCOUNTER
Edson from home health called to let  know that home [facility] started services and wondering if  can sign off on orders.

## 2024-07-08 ENCOUNTER — MED REC SCAN ONLY (OUTPATIENT)
Dept: INTERNAL MEDICINE CLINIC | Facility: CLINIC | Age: 89
End: 2024-07-08

## 2024-07-08 NOTE — TELEPHONE ENCOUNTER
Per dr romano he approves to sign off on orders, also would like pt to schedule a follow up appt in 4 weeks, please advise and assist with scheduling pt

## 2024-07-08 NOTE — TELEPHONE ENCOUNTER
LEO: Talked to Sudheer(LEXIE) son of patient, patient has an appointment on 09/03/2024 already and would like to know if that is fine or do patient needs to reschedule earlier. If so can we use \"res24\" please advise.  Thank you.

## 2024-07-15 ENCOUNTER — HOME HEALTH CHARGES (OUTPATIENT)
Dept: INTERNAL MEDICINE CLINIC | Facility: CLINIC | Age: 89
End: 2024-07-15

## 2024-07-15 DIAGNOSIS — R55 SYNCOPE AND COLLAPSE: ICD-10-CM

## 2024-07-15 DIAGNOSIS — E11.9 TYPE 2 DIABETES MELLITUS WITHOUT COMPLICATION, UNSPECIFIED WHETHER LONG TERM INSULIN USE (HCC): ICD-10-CM

## 2024-07-15 DIAGNOSIS — I10 ESSENTIAL (PRIMARY) HYPERTENSION: Primary | ICD-10-CM

## 2024-07-15 DIAGNOSIS — C61 MALIGNANT NEOPLASM OF PROSTATE (HCC): ICD-10-CM

## 2024-07-15 NOTE — PROGRESS NOTES
This is the initial home health certification for [facility]  SOC: 7/2/2024  Dates of service are for 7/2/2024 to 8/30/2024    See scanned reports for plan of care     Order# [id number]

## 2024-07-15 NOTE — PROGRESS NOTES
This is the initial home health certification for [facility]  SOC: 7/2/2024  Dates of service are for 7/2/2024 to 8/30/2024     See scanned reports for plan of care     Order# [id number]

## 2024-07-19 DIAGNOSIS — N40.0 BENIGN PROSTATIC HYPERPLASIA WITHOUT LOWER URINARY TRACT SYMPTOMS: ICD-10-CM

## 2024-07-23 RX ORDER — TAMSULOSIN HYDROCHLORIDE 0.4 MG/1
0.4 CAPSULE ORAL DAILY
Qty: 90 CAPSULE | Refills: 1 | OUTPATIENT
Start: 2024-07-23

## 2024-07-24 RX ORDER — MIRABEGRON 50 MG/1
50 TABLET, EXTENDED RELEASE ORAL DAILY
Qty: 30 TABLET | Refills: 0 | Status: SHIPPED | OUTPATIENT
Start: 2024-07-24

## 2024-07-25 ENCOUNTER — TELEPHONE (OUTPATIENT)
Dept: INTERNAL MEDICINE CLINIC | Facility: CLINIC | Age: 89
End: 2024-07-25

## 2024-07-25 NOTE — TELEPHONE ENCOUNTER
On site staff have you received this form?    Simi from [facility] states she sent a revised order form for  to sign. They had to re-code the orders and need this one signed as well. Simi is aware  is out of the office until 7/29/24.

## 2024-07-25 NOTE — TELEPHONE ENCOUNTER
Refill passed per [facility] protocol.    Requested Prescriptions   Pending Prescriptions Disp Refills    MYRBETRIQ 50 MG Oral Tablet 24 Hr [Pharmacy Med Name: MYRBETRIQ 50MG ER TABLETS] 30 tablet 0     Sig: TAKE 1 TABLET(50 MG) BY MOUTH DAILY       Genitourinary Medications Passed - 7/22/2024  4:53 PM        Passed - Patient does not have pulmonary hypertension on problem list        Passed - In person appointment or virtual visit in the past 12 mos or appointment in next 3 mos     Recent Outpatient Visits              2 months ago Mild dementia without behavioral disturbance, psychotic disturbance, mood disturbance, or anxiety, unspecified dementia type (HCC)    [facility] Ayden Samano MD    Office Visit    4 months ago Hypertension, benign    [facility] Marline Saldivar APRN    Office Visit    4 months ago Normocytic anemia    [facility] Hematology Oncology Viktor Bloom MD    Office Visit    4 months ago Prostate cancer ([facility])    [facility] Hematology Oncology    Nurse Only    8 months ago Prostate cancer ([facility])    [facility] Hematology Oncology Viktor Bloom MD    Office Visit          Future Appointments         Provider Department Appt Notes    In 1 month Ayden Samano MD [facility]     In 1 month [facility] Hematology Oncology FT-PSA-PIV.cl    In 1 month Viktor Bloom MD [facility] Hematology Oncology FOLLOW UP VISIT.CL  6M                       Future Appointments         Provider Department Appt Notes    In 1 month Ayden Samano MD [facility]     In 1 month [facility] Hematology Oncology FT-PSA-PIV.cl    In 1 month Viktor Bloom MD [facility] Hematology Oncology FOLLOW UP VISIT.CL  6M          Recent Outpatient  Visits              2 months ago Mild dementia without behavioral disturbance, psychotic disturbance, mood disturbance, or anxiety, unspecified dementia type (HCC)    [facility] Ayden Samano MD    Office Visit    4 months ago Hypertension, benign    [facility] Marline Saldivar APRN    Office Visit    4 months ago Normocytic anemia    [facility] Hematology Oncology Viktor Bloom MD    Office Visit    4 months ago Prostate cancer ([facility])    [facility] Hematology Oncology    Nurse Only    8 months ago Prostate cancer ([facility])    [facility] Hematology Oncology Viktor Bloom MD    Office Visit

## 2024-07-30 ENCOUNTER — MED REC SCAN ONLY (OUTPATIENT)
Dept: INTERNAL MEDICINE CLINIC | Facility: CLINIC | Age: 89
End: 2024-07-30

## 2024-08-02 ENCOUNTER — MED REC SCAN ONLY (OUTPATIENT)
Dept: INTERNAL MEDICINE CLINIC | Facility: CLINIC | Age: 89
End: 2024-08-02

## 2024-08-02 ENCOUNTER — APPOINTMENT (OUTPATIENT)
Dept: GENERAL RADIOLOGY | Facility: HOSPITAL | Age: 89
End: 2024-08-02
Attending: EMERGENCY MEDICINE
Payer: MEDICARE

## 2024-08-02 ENCOUNTER — HOSPITAL ENCOUNTER (OUTPATIENT)
Facility: HOSPITAL | Age: 89
Setting detail: OBSERVATION
Discharge: HOME | End: 2024-08-05
Attending: EMERGENCY MEDICINE
Payer: MEDICARE

## 2024-08-02 ENCOUNTER — APPOINTMENT (OUTPATIENT)
Dept: NUCLEAR MEDICINE | Facility: HOSPITAL | Age: 89
End: 2024-08-02
Attending: INTERNAL MEDICINE
Payer: MEDICARE

## 2024-08-02 ENCOUNTER — APPOINTMENT (OUTPATIENT)
Dept: CV DIAGNOSTICS | Facility: HOSPITAL | Age: 89
End: 2024-08-02
Attending: INTERNAL MEDICINE
Payer: MEDICARE

## 2024-08-02 DIAGNOSIS — R07.9 CHEST PAIN WITH MODERATE RISK FOR CARDIAC ETIOLOGY: Primary | ICD-10-CM

## 2024-08-02 LAB
% OF MAX PREDICTED HR: 100 %
ALBUMIN SERPL-MCNC: 3.9 G/DL (ref 3.2–4.8)
ALBUMIN/GLOB SERPL: 1.2 {RATIO} (ref 1–2)
ALP LIVER SERPL-CCNC: 75 U/L
ALT SERPL-CCNC: 8 U/L
ANION GAP SERPL CALC-SCNC: 7 MMOL/L (ref 0–18)
AST SERPL-CCNC: 14 U/L (ref ?–34)
ATRIAL RATE: 66 BPM
ATRIAL RATE: 72 BPM
BASOPHILS # BLD AUTO: 0.02 X10(3) UL (ref 0–0.2)
BASOPHILS NFR BLD AUTO: 0.4 %
BILIRUB SERPL-MCNC: 0.2 MG/DL (ref 0.2–1.1)
BNP SERPL-MCNC: 61 PG/ML
BUN BLD-MCNC: 25 MG/DL (ref 9–23)
BUN/CREAT SERPL: 21.2 (ref 10–20)
CALCIUM BLD-MCNC: 10.2 MG/DL (ref 8.7–10.4)
CHLORIDE SERPL-SCNC: 107 MMOL/L (ref 98–112)
CHOLEST SERPL-MCNC: 164 MG/DL (ref ?–200)
CO2 SERPL-SCNC: 29 MMOL/L (ref 21–32)
CREAT BLD-MCNC: 1.18 MG/DL
D DIMER PPP FEU-MCNC: 0.65 UG/ML FEU (ref ?–0.88)
DEPRECATED RDW RBC AUTO: 49.3 FL (ref 35.1–46.3)
EGFRCR SERPLBLD CKD-EPI 2021: 59 ML/MIN/1.73M2 (ref 60–?)
EOSINOPHIL # BLD AUTO: 0.09 X10(3) UL (ref 0–0.7)
EOSINOPHIL NFR BLD AUTO: 1.9 %
ERYTHROCYTE [DISTWIDTH] IN BLOOD BY AUTOMATED COUNT: 16.2 % (ref 11–15)
GLOBULIN PLAS-MCNC: 3.3 G/DL (ref 2–3.5)
GLUCOSE BLD-MCNC: 133 MG/DL (ref 70–99)
GLUCOSE BLDC GLUCOMTR-MCNC: 106 MG/DL (ref 70–99)
GLUCOSE BLDC GLUCOMTR-MCNC: 116 MG/DL (ref 70–99)
GLUCOSE BLDC GLUCOMTR-MCNC: 153 MG/DL (ref 70–99)
HCT VFR BLD AUTO: 33.3 %
HDLC SERPL-MCNC: 41 MG/DL (ref 40–59)
HGB BLD-MCNC: 10.7 G/DL
IMM GRANULOCYTES # BLD AUTO: 0.01 X10(3) UL (ref 0–1)
IMM GRANULOCYTES NFR BLD: 0.2 %
LDLC SERPL CALC-MCNC: 100 MG/DL (ref ?–100)
LIPASE SERPL-CCNC: 28 U/L (ref 12–53)
LYMPHOCYTES # BLD AUTO: 1 X10(3) UL (ref 1–4)
LYMPHOCYTES NFR BLD AUTO: 20.8 %
MAGNESIUM SERPL-MCNC: 1.6 MG/DL (ref 1.6–2.6)
MAX DIASTOLIC BP: 63 MMHG
MAX HEART RATE: 91 BPM
MAX PREDICTED HEART RATE: 132 BPM
MAX SYSTOLIC BP: 160 MMHG
MAX WORK LOAD: 10
MCH RBC QN AUTO: 26.7 PG (ref 26–34)
MCHC RBC AUTO-ENTMCNC: 32.1 G/DL (ref 31–37)
MCV RBC AUTO: 83 FL
MONOCYTES # BLD AUTO: 0.49 X10(3) UL (ref 0.1–1)
MONOCYTES NFR BLD AUTO: 10.2 %
NEUTROPHILS # BLD AUTO: 3.2 X10 (3) UL (ref 1.5–7.7)
NEUTROPHILS # BLD AUTO: 3.2 X10(3) UL (ref 1.5–7.7)
NEUTROPHILS NFR BLD AUTO: 66.5 %
NONHDLC SERPL-MCNC: 123 MG/DL (ref ?–130)
OSMOLALITY SERPL CALC.SUM OF ELEC: 302 MOSM/KG (ref 275–295)
P AXIS: 39 DEGREES
P AXIS: 55 DEGREES
P-R INTERVAL: 330 MS
P-R INTERVAL: 344 MS
PLATELET # BLD AUTO: 216 10(3)UL (ref 150–450)
POTASSIUM SERPL-SCNC: 3.7 MMOL/L (ref 3.5–5.1)
PROT SERPL-MCNC: 7.2 G/DL (ref 5.7–8.2)
Q-T INTERVAL: 462 MS
Q-T INTERVAL: 462 MS
QRS DURATION: 134 MS
QRS DURATION: 136 MS
QTC CALCULATION (BEZET): 484 MS
QTC CALCULATION (BEZET): 505 MS
R AXIS: -36 DEGREES
R AXIS: -48 DEGREES
RBC # BLD AUTO: 4.01 X10(6)UL
SODIUM SERPL-SCNC: 143 MMOL/L (ref 136–145)
T AXIS: 121 DEGREES
T AXIS: 124 DEGREES
TRIGL SERPL-MCNC: 128 MG/DL (ref 30–149)
TROPONIN I SERPL HS-MCNC: 72 NG/L
TROPONIN I SERPL HS-MCNC: 74 NG/L
TROPONIN I SERPL HS-MCNC: 75 NG/L
VENTRICULAR RATE: 66 BPM
VENTRICULAR RATE: 72 BPM
VLDLC SERPL CALC-MCNC: 21 MG/DL (ref 0–30)
WBC # BLD AUTO: 4.8 X10(3) UL (ref 4–11)

## 2024-08-02 PROCEDURE — 93017 CV STRESS TEST TRACING ONLY: CPT | Performed by: INTERNAL MEDICINE

## 2024-08-02 PROCEDURE — 78452 HT MUSCLE IMAGE SPECT MULT: CPT | Performed by: INTERNAL MEDICINE

## 2024-08-02 PROCEDURE — 99223 1ST HOSP IP/OBS HIGH 75: CPT | Performed by: HOSPITALIST

## 2024-08-02 PROCEDURE — 71045 X-RAY EXAM CHEST 1 VIEW: CPT | Performed by: EMERGENCY MEDICINE

## 2024-08-02 RX ORDER — LISINOPRIL AND HYDROCHLOROTHIAZIDE 20; 12.5 MG/1; MG/1
2 TABLET ORAL DAILY
Status: DISCONTINUED | OUTPATIENT
Start: 2024-08-02 | End: 2024-08-02 | Stop reason: RX

## 2024-08-02 RX ORDER — MORPHINE SULFATE 2 MG/ML
2 INJECTION, SOLUTION INTRAMUSCULAR; INTRAVENOUS ONCE
Status: DISCONTINUED | OUTPATIENT
Start: 2024-08-02 | End: 2024-08-02

## 2024-08-02 RX ORDER — PRAVASTATIN SODIUM 10 MG
20 TABLET ORAL NIGHTLY
Status: DISCONTINUED | OUTPATIENT
Start: 2024-08-02 | End: 2024-08-05

## 2024-08-02 RX ORDER — FAMOTIDINE 10 MG/ML
20 INJECTION, SOLUTION INTRAVENOUS ONCE
Status: COMPLETED | OUTPATIENT
Start: 2024-08-02 | End: 2024-08-02

## 2024-08-02 RX ORDER — ASPIRIN 81 MG/1
324 TABLET, CHEWABLE ORAL ONCE
Status: COMPLETED | OUTPATIENT
Start: 2024-08-02 | End: 2024-08-02

## 2024-08-02 RX ORDER — MAGNESIUM OXIDE 400 MG/1
400 TABLET ORAL ONCE
Status: COMPLETED | OUTPATIENT
Start: 2024-08-02 | End: 2024-08-02

## 2024-08-02 RX ORDER — HEPARIN SODIUM 5000 [USP'U]/ML
5000 INJECTION, SOLUTION INTRAVENOUS; SUBCUTANEOUS EVERY 8 HOURS SCHEDULED
Status: DISCONTINUED | OUTPATIENT
Start: 2024-08-02 | End: 2024-08-05

## 2024-08-02 RX ORDER — HYDROCODONE BITARTRATE AND ACETAMINOPHEN 5; 325 MG/1; MG/1
1 TABLET ORAL EVERY 4 HOURS PRN
Status: DISCONTINUED | OUTPATIENT
Start: 2024-08-02 | End: 2024-08-05

## 2024-08-02 RX ORDER — POTASSIUM CHLORIDE 20 MEQ/1
40 TABLET, EXTENDED RELEASE ORAL ONCE
Status: COMPLETED | OUTPATIENT
Start: 2024-08-02 | End: 2024-08-02

## 2024-08-02 RX ORDER — ACETAMINOPHEN 325 MG/1
650 TABLET ORAL EVERY 4 HOURS PRN
Status: DISCONTINUED | OUTPATIENT
Start: 2024-08-02 | End: 2024-08-05

## 2024-08-02 RX ORDER — SODIUM CHLORIDE 9 MG/ML
INJECTION, SOLUTION INTRAVENOUS CONTINUOUS
Status: DISCONTINUED | OUTPATIENT
Start: 2024-08-02 | End: 2024-08-03

## 2024-08-02 RX ORDER — REGADENOSON 0.08 MG/ML
INJECTION, SOLUTION INTRAVENOUS
Status: COMPLETED
Start: 2024-08-02 | End: 2024-08-02

## 2024-08-02 RX ORDER — AMLODIPINE BESYLATE 5 MG/1
5 TABLET ORAL DAILY
Status: DISCONTINUED | OUTPATIENT
Start: 2024-08-02 | End: 2024-08-05

## 2024-08-02 RX ORDER — ASPIRIN 81 MG/1
81 TABLET ORAL DAILY
Status: DISCONTINUED | OUTPATIENT
Start: 2024-08-02 | End: 2024-08-05

## 2024-08-02 RX ORDER — FERROUS SULFATE 325(65) MG
325 TABLET, DELAYED RELEASE (ENTERIC COATED) ORAL
Status: DISCONTINUED | OUTPATIENT
Start: 2024-08-03 | End: 2024-08-05

## 2024-08-02 RX ORDER — POLYETHYLENE GLYCOL 3350 17 G/17G
17 POWDER, FOR SOLUTION ORAL 2 TIMES DAILY
Status: DISCONTINUED | OUTPATIENT
Start: 2024-08-02 | End: 2024-08-05

## 2024-08-02 RX ORDER — DONEPEZIL HYDROCHLORIDE 5 MG/1
5 TABLET, FILM COATED ORAL NIGHTLY
Status: DISCONTINUED | OUTPATIENT
Start: 2024-08-02 | End: 2024-08-05

## 2024-08-02 RX ORDER — DOCUSATE SODIUM 100 MG/1
100 CAPSULE, LIQUID FILLED ORAL 2 TIMES DAILY
Status: DISCONTINUED | OUTPATIENT
Start: 2024-08-02 | End: 2024-08-05

## 2024-08-02 RX ORDER — HYDROCODONE BITARTRATE AND ACETAMINOPHEN 5; 325 MG/1; MG/1
2 TABLET ORAL EVERY 4 HOURS PRN
Status: DISCONTINUED | OUTPATIENT
Start: 2024-08-02 | End: 2024-08-05

## 2024-08-02 RX ORDER — ONDANSETRON 2 MG/ML
4 INJECTION INTRAMUSCULAR; INTRAVENOUS EVERY 6 HOURS PRN
Status: DISCONTINUED | OUTPATIENT
Start: 2024-08-02 | End: 2024-08-05

## 2024-08-02 RX ORDER — METOCLOPRAMIDE HYDROCHLORIDE 5 MG/ML
10 INJECTION INTRAMUSCULAR; INTRAVENOUS EVERY 8 HOURS PRN
Status: DISCONTINUED | OUTPATIENT
Start: 2024-08-02 | End: 2024-08-05

## 2024-08-02 RX ORDER — PANTOPRAZOLE SODIUM 40 MG/1
40 TABLET, DELAYED RELEASE ORAL
Status: DISCONTINUED | OUTPATIENT
Start: 2024-08-02 | End: 2024-08-05

## 2024-08-02 RX ORDER — TAMSULOSIN HYDROCHLORIDE 0.4 MG/1
0.4 CAPSULE ORAL DAILY
Status: DISCONTINUED | OUTPATIENT
Start: 2024-08-02 | End: 2024-08-05

## 2024-08-02 RX ORDER — SENNOSIDES 8.6 MG
8.6 TABLET ORAL 2 TIMES DAILY
Status: DISCONTINUED | OUTPATIENT
Start: 2024-08-02 | End: 2024-08-05

## 2024-08-02 NOTE — PLAN OF CARE
Case/ plan d/w MD. Normal perfusion study. Dr. Bond to update the patient. No further cardiac testing. Will sign off at this time. Please call with any questions / concerns . RN notified.     LILIAN Heller  8/2/2024  5:12 PM  Ph [phone] ([place])  Ph [phone] ([place])

## 2024-08-02 NOTE — CONSULTS
[facility] - CARDIOLOGY CONSULT NOTE    Leonard Shay Patient Status:  Observation    3/20/1935 MRN [id number]   Location [facility] 3W/SW Attending Britt Buckner MD   Hosp Day # 0 PCP Ayden Samano MD     Date of Admission:  2024  Date of Consult:  2024  I was asked by Britt Buckner MD to provide recommendations for evaluation and management of cardiac issues.  Impression:     89-year-old male history of prostate cancer, dementia, diabetes now with atypical chest pain and elevated troponin    Recommendations:  1.  Chest pain  Atypical currently pain-free.  Lexiscan nuclear stress test and 2D echocardiogram to evaluate for reversible ischemia.   Low suspicion of pulmonary embolism D-dimer is normal.  2.  Left bundle branch block not new appears to have had in .  3.  Diabetes mellitus  Management as per primary team.  3.  Hyperlipidemia .  On lovastatin 20 mg daily switch to rosuvastatin 20 mg daily.  4.  Hypertension  At home on amlodipine and lisinopril hydrochlorothiazide combination to continue.  Elevated on presentation.    Continue to keep NPO.      L5 consult, will follow    Reason for Consultation:     Chest pain    History of Present Illness:   Patient is a 89 year old male who was admitted to the hospital for chest pain.  Poor historian history of dementia history taken with assistance of his son who is a .  Patient has been staying with him recently after subacute rehab.  While driving today he complained of chest pain lasted for about 10 minutes.  No prior pain like this in the past.  Center of the chest without radiation sharp sensation.  No syncope no palpitations no diaphoresis.  Son decided to bring him to the emergency room.  Prior history of prostate cancer, chronic anemia.  No prior history of MI or CHF.  Troponins were found to be elevated 73 followed by 75.  Currently chest pain-free.      Cardiac tests:    Past Medical  History  Past Medical History:    Allergic rhinitis    Anemia    Anemia due to blood loss    Aspirin long-term use    Cancer (HCC)    Chronic low back pain    lumbar spine; now s/p lumbar fusion    Dementia (HCC)    Diabetes (HCC)    Diabetes mellitus, type 2 (pick complications) (HCC)    History of diverticulosis    Lipid screening    Other and unspecified hyperlipidemia    Other male erectile dysfunction    Rectal bleeding    hemorrhoidal bleeding    Rotator cuff tear, left    Unspecified essential hypertension       Past Surgical History  Past Surgical History:   Procedure Laterality Date    Appendectomy      Colonoscopy N/A 5/4/2021    Procedure: COLONOSCOPY;  Surgeon: Ulysses Harley MD;  Location: [facility] ENDOSCOPY    Laminectomy  1987    Lumbar spine fusion combined  8-8-13    Repair rotator cuff,acute Left 1993    Tonsillectomy         Family History  Family History   Problem Relation Age of Onset    Heart Disease Father 70        CAD    Cancer Brother 83    Prostate Cancer Son 57    Cancer Brother 81        portion of bowel    Breast Cancer Neg     Pancreatic Cancer Neg     Ovarian Cancer Neg     Colon Cancer Neg        Social History  Pediatric History   Patient Parents    Not on file     Other Topics Concern     Service Not Asked    Blood Transfusions Not Asked    Caffeine Concern Yes    Occupational Exposure Not Asked    Hobby Hazards Not Asked    Sleep Concern Not Asked    Stress Concern Not Asked    Weight Concern Not Asked    Special Diet Not Asked    Back Care Not Asked    Exercise Not Asked    Bike Helmet Not Asked    Seat Belt Not Asked    Self-Exams Not Asked   Social History Narrative    Alyssia is . Father of 5 adult children, multiple grandchildren and great grandchildren. Patient is retired from being a  for the [employer]. Patient lives alone in [place], [place]. Patient enjoys time with his dogs, fans of the Cubs, and basketball.        Denies smoking or drug  use.      Current Medications:  No current facility-administered medications for this encounter.     Medications Prior to Admission   Medication Sig    Mirabegron ER (MYRBETRIQ) 50 MG Oral Tablet 24 Hr Take 1 tablet (50 mg total) by mouth daily.    amLODIPine 5 MG Oral Tab Take 1 tablet (5 mg total) by mouth daily.    gabapentin 300 MG Oral Cap Take 2 capsules (600 mg total) by mouth nightly.    metFORMIN  MG Oral Tablet 24 Hr Take 1 tablet (500 mg total) by mouth daily.    pantoprazole 40 MG Oral Tab EC Take 1 tablet (40 mg total) by mouth before breakfast.    donepezil (ARICEPT) 5 MG Oral Tab Take 1 tablet (5 mg total) by mouth nightly.    lisinopril-hydroCHLOROthiazide 20-12.5 MG Oral Tab Take 2 tablets by mouth daily.    tamsulosin 0.4 MG Oral Cap Take 1 capsule (0.4 mg total) by mouth daily.    Lovastatin 20 MG Oral Tab Take 1 tablet (20 mg total) by mouth nightly.    Ferrous Sulfate 325 (65 Fe) MG Oral Tab Take 1 tablet (325 mg total) by mouth daily with breakfast.    Fluticasone Propionate 50 MCG/ACT Nasal Suspension INHALE 1 SPRAY BY NASAL ROUTE DAILY    aspirin 81 MG Oral Tab EC Take 1 tablet (81 mg total) by mouth daily.       Allergies  Allergies   Allergen Reactions    Eligard [Leuprolide] RASH     Rash with large sized blisters       Review of Systems:   10 pt ROS performed, separate from HPI  Review of Systems:  GENERAL: no fevers, chills, sweats  HEENT: no visual or hearing changes  SKIN: denies any unusual skin lesions or rashes  RESPIRATORY:   shortness of breath with exertion  CARDIOVASCULAR: no active chest pain, no claudication  GI: denies abdominal pain and denies heartburn  : no dysuria or hematuria  NEURO: denies headaches, focal weaknesses or paresthesias  All other systems reviewed and negative.  fatigue is present  All other systems were reviewed are negative  Physical Exam:   Blood pressure 158/86, pulse 75, temperature 98.1 °F (36.7 °C), temperature source Oral, resp. rate 15,  height 175.3 cm (5' 9\"), weight 155 lb (70.3 kg), SpO2 100%.    Scheduled Meds:       Physical Exam:    General: Alert and oriented. No apparent distress. No respiratory or constitutional distress.  HEENT: Normocephalic, anicteric sclera, neck supple  Neck: No JVD, carotids 2+, supple  Cardiac: Regular rate. No pathologic murmur.  Lungs: Clear with normal effort.  Normal excursions and effort.  Abdomen: Soft, non-tender. BS-present.  Extremities: Without clubbing, cyanosis.  Peripheral pulsespresent.  Neurologic: Alert and oriented, normal affect. Motor ok.  Skin: Warm and dry.     Results:     Laboratory Data:  Lab Results   Component Value Date    WBC 4.8 08/02/2024    HGB 10.7 (L) 08/02/2024    HCT 33.3 (L) 08/02/2024    .0 08/02/2024    CREATSERUM 1.18 08/02/2024    BUN 25 (H) 08/02/2024     08/02/2024    K 3.7 08/02/2024     08/02/2024    CO2 29.0 08/02/2024     (H) 08/02/2024    CA 10.2 08/02/2024    ALB 3.9 08/02/2024    ALKPHO 75 08/02/2024    TP 7.2 08/02/2024    AST 14 08/02/2024    ALT 8 (L) 08/02/2024    PTT 34.8 05/02/2021    INR 1.09 05/02/2021    PTP 13.9 05/02/2021    TSH 3.220 11/09/2022    LIP 28 08/02/2024    PSA <0.04 03/12/2024    DDIMER 0.65 08/02/2024    ESRML 60 (H) 12/06/2022    CRP 0.48 (H) 12/06/2022    MG 1.6 08/02/2024         Thank you for allowing me to participate in the care of your patient.    Kennedy Bond MD  [facility]  Interventional Cardiology  8/2/2024

## 2024-08-02 NOTE — ED QUICK NOTES
Pt resting on cart in low/locked position, side rails up x 2, call light w/ in reach.  Blood drawn and sent per orders.  Pt in NAD, VSS.  Pt provided w/ blanket per request.  Pt denies any new needs or complaints at this time.

## 2024-08-02 NOTE — H&P
[facility]  part of [facility]    History & Physical    Leonard Shay Patient Status:  Observation    3/20/1935 MRN [id number]   Location [facility] 3W/SW Attending Britt Buckner MD   Hosp Day # 0 PCP Ayden Samano MD     Date:  2024  Date of Admission:  2024    History provided by:patient and son  Chief Complaint:     Chief Complaint   Patient presents with    Chest Pain Angina    Abdomen/Flank Pain       HPI:   Leonard Shay is a(n) 89 year old male who has a pmh of chronic dementia, chronic low back pain, DM2, prostate cancer, history of MI who was admitted for acute chest pain. Patient seems very lethargic. Most of the history was provided by the son.   Patient currently stays with the son. Per son, patient was complaining of chest pain this morning that lasted around 10 minutes, described as sharp, nonradiating. Denies LOC, falls, dyspnea on exertion.   He will be admitted for observation.       History     Past Medical History:    Allergic rhinitis    Anemia    Anemia due to blood loss    Aspirin long-term use    Cancer (HCC)    Chronic low back pain    lumbar spine; now s/p lumbar fusion    Dementia (HCC)    Diabetes (HCC)    Diabetes mellitus, type 2 (pick complications) (HCC)    History of diverticulosis    Lipid screening    Other and unspecified hyperlipidemia    Other male erectile dysfunction    Rectal bleeding    hemorrhoidal bleeding    Rotator cuff tear, left    Unspecified essential hypertension     Past Surgical History:   Procedure Laterality Date    Appendectomy      Colonoscopy N/A 2021    Procedure: COLONOSCOPY;  Surgeon: Ulysses Harley MD;  Location: [facility] ENDOSCOPY    Laminectomy      Lumbar spine fusion combined  13    Repair rotator cuff,acute Left     Tonsillectomy       Family History   Problem Relation Age of Onset    Heart Disease Father 70        CAD    Cancer Brother 83    Prostate Cancer Son 57    Cancer Brother  81        portion of bowel    Breast Cancer Neg     Pancreatic Cancer Neg     Ovarian Cancer Neg     Colon Cancer Neg      Social History:  Social History     Socioeconomic History    Marital status:     Number of children: 4   Tobacco Use    Smoking status: Former     Current packs/day: 0.00     Average packs/day: 1 pack/day for 20.0 years (20.0 ttl pk-yrs)     Types: Cigarettes     Start date: 1952     Quit date: 1972     Years since quittin.6    Smokeless tobacco: Never   Vaping Use    Vaping status: Never Used   Substance and Sexual Activity    Alcohol use: No     Comment: no alcohol use since     Drug use: No   Other Topics Concern    Caffeine Concern Yes   Social History Narrative    Alyssia is . Father of 5 adult children, multiple grandchildren and great grandchildren. Patient is retired from being a  for the [employer]. Patient lives alone in [place], [place]. Patient enjoys time with his dogs, fans of the Cubs, and basketball.      Social Determinants of Health     Food Insecurity: No Food Insecurity (6/3/2024)    Received from [facility]    Food Insecurity     Currently or in the past 3 months, have you worried your food would run out before you had money to buy more?: No     In the past 12 months, have you run out of food or been unable to get more?: No   Transportation Needs: No Transportation Needs (6/3/2024)    Received from [facility]    Transportation Needs     Medical Transportation Needs?: No    Received from [facility]    Housing Stability     Allergies/Medications:   Allergies:   Allergies   Allergen Reactions    Eligard [Leuprolide] RASH     Rash with large sized blisters     Medications Prior to Admission   Medication Sig    Mirabegron ER (MYRBETRIQ) 50 MG Oral Tablet 24 Hr Take 1 tablet (50 mg total) by mouth daily.    amLODIPine 5 MG Oral Tab Take 1 tablet (5 mg total) by mouth daily.     gabapentin 300 MG Oral Cap Take 2 capsules (600 mg total) by mouth nightly.    metFORMIN  MG Oral Tablet 24 Hr Take 1 tablet (500 mg total) by mouth daily.    pantoprazole 40 MG Oral Tab EC Take 1 tablet (40 mg total) by mouth before breakfast.    donepezil (ARICEPT) 5 MG Oral Tab Take 1 tablet (5 mg total) by mouth nightly.    lisinopril-hydroCHLOROthiazide 20-12.5 MG Oral Tab Take 2 tablets by mouth daily.    tamsulosin 0.4 MG Oral Cap Take 1 capsule (0.4 mg total) by mouth daily.    Lovastatin 20 MG Oral Tab Take 1 tablet (20 mg total) by mouth nightly.    Ferrous Sulfate 325 (65 Fe) MG Oral Tab Take 1 tablet (325 mg total) by mouth daily with breakfast.    Fluticasone Propionate 50 MCG/ACT Nasal Suspension INHALE 1 SPRAY BY NASAL ROUTE DAILY    aspirin 81 MG Oral Tab EC Take 1 tablet (81 mg total) by mouth daily.       Review of Systems:   Constitutional: negative  Eyes: negative  Ears, nose, mouth, throat, and face: negative  Respiratory: negative  Cardiovascular: chest pain.  Gastrointestinal: negative  Genitourinary:negative  Musculoskeletal:negative  Neurological: negative  Behavioral/Psych: negative  Endocrine: negative    Physical Exam:   Vital Signs:  Blood pressure 158/86, pulse 75, temperature 98.1 °F (36.7 °C), temperature source Oral, resp. rate 15, height 5' 9\" (1.753 m), weight 155 lb (70.3 kg), SpO2 100%.     GENERAL:  The patient appeared to be in no distress.  Lying in bed, comfortably.  SKIN:  Warm and hydrated  PSYCHIATRIC: Calm and cooperative   HEENT:  Head was atraumatic and normocephalic.  Eyes:  Extraocular muscles were intact.  Sclera was anicteric.  Pupils were equally reactive to light.  Ears:  There were no lesions.  Nose:  No lesions were noted.  Throat:  There was no thrush, no exudate, no erythema.  There was no evidence of gum bleeding.  NECK:  Supple.  There was no JVD.   CHEST:  Symmetrical movement on inspiration  HEART:  S1 and S2 heard.  RRR   LUNGS:  Air entry was good.   No crackles or wheezes   ABDOMEN: Soft and non-tender.  Bowel sounds were present.  MUSCULOSKELETAL:  There was no deformity.  There was full range of motion in all the extremities.   EXTREMITIES: There was no edema, clubbing or cyanosis  NEUROLOGICAL:  There was no focal deficit.  Cranial nerves II through XII were intact.      Results:     Lab Results   Component Value Date    WBC 4.8 08/02/2024    HGB 10.7 (L) 08/02/2024    HCT 33.3 (L) 08/02/2024    .0 08/02/2024    CREATSERUM 1.18 08/02/2024    BUN 25 (H) 08/02/2024     08/02/2024    K 3.7 08/02/2024     08/02/2024    CO2 29.0 08/02/2024     (H) 08/02/2024    CA 10.2 08/02/2024    ALB 3.9 08/02/2024    ALKPHO 75 08/02/2024    BILT 0.2 08/02/2024    TP 7.2 08/02/2024    AST 14 08/02/2024    ALT 8 (L) 08/02/2024    PTT 34.8 05/02/2021    INR 1.09 05/02/2021    PT 13.5 05/10/2011    TSH 3.220 11/09/2022    LIP 28 08/02/2024    PSA <0.04 03/12/2024    DDIMER 0.65 08/02/2024    ESRML 60 (H) 12/06/2022    CRP 0.48 (H) 12/06/2022    MG 1.6 08/02/2024       Recent Labs   Lab 08/02/24  0601   RBC 4.01   HGB 10.7*   HCT 33.3*   MCV 83.0   MCH 26.7   MCHC 32.1   RDW 16.2*   NEPRELIM 3.20   WBC 4.8   .0       Recent Labs   Lab 08/02/24  0601   *   BUN 25*   CREATSERUM 1.18   CA 10.2   ALB 3.9      K 3.7      CO2 29.0   ALKPHO 75   AST 14   ALT 8*   BILT 0.2   TP 7.2       XR CHEST AP PORTABLE  (CPT=71045)    Result Date: 8/2/2024  CONCLUSION:   No focal opacity, pleural effusion, or pneumothorax.    Dictated by (CST): Julio Cesar Real MD on 8/02/2024 at 7:47 AM     Finalized by (CST): Julio Cesar Real MD on 8/02/2024 at 7:49 AM         EKG 12 Lead    Result Date: 8/2/2024  Sinus rhythm with 1st degree A-V block Left axis deviation Left bundle branch block Abnormal ECG When compared with ECG of 02-AUG-2024 05:12, T wave inversion less evident in Lateral leads    EKG 12 Lead    Result Date: 8/2/2024  Sinus rhythm with 1st  degree A-V block Left axis deviation Left bundle branch block Abnormal ECG No previous ECGs found in [facility]     Assessment/Plan:     Chest pain with moderate risk for cardiac etiology    Elevated Troponin  - Cards on board  - lexiscan stress test and echo pending    DM2  - SSI low dose  - accuechecks     Dyslipidemia  - statin     HTN  - resume home meds     DVT proph- heparin    Full code    MDM High. Time spent on chart/note review, review of labs/imaging, discussion with patient, physical exam, discussion with staff, consultants, coordinating care, writing progress note, and discussion of plan of care.           PONCE ALBARADO MD  8/2/2024

## 2024-08-02 NOTE — ED PROVIDER NOTES
Patient Seen in: [facility] Emergency Department      History     Chief Complaint   Patient presents with    Chest Pain Angina    Abdomen/Flank Pain     Stated Complaint: Chest Pain    Subjective:   HPI   89-year-old male with type 2 diabetes, hypertension, GERD, prostate cancer status postchemotherapy, dementia, who presents for evaluation of chest pain.  He reports that chest pain began 30 minutes prior to arrival.  Despite triage note, he denies cough, shortness of breath, nausea or vomiting, ripping or tearing sensation of chest or back, numbness tingling or weakness in the arms or legs.  Denies slurred speech or facial droop.  No leg swelling.        Objective:   Past Medical History:    Allergic rhinitis    Anemia    Anemia due to blood loss    Aspirin long-term use    Cancer (HCC)    Chronic low back pain    lumbar spine; now s/p lumbar fusion    Dementia (HCC)    Diabetes (HCC)    Diabetes mellitus, type 2 (pick complications) (HCC)    History of diverticulosis    Lipid screening    Other and unspecified hyperlipidemia    Other male erectile dysfunction    Rectal bleeding    hemorrhoidal bleeding    Rotator cuff tear, left    Unspecified essential hypertension              Past Surgical History:   Procedure Laterality Date    Appendectomy      Colonoscopy N/A 2021    Procedure: COLONOSCOPY;  Surgeon: Ulysses Harley MD;  Location: [facility] ENDOSCOPY    Laminectomy      Lumbar spine fusion combined  13    Repair rotator cuff,acute Left     Tonsillectomy                  Social History     Socioeconomic History    Marital status:     Number of children: 4   Tobacco Use    Smoking status: Former     Current packs/day: 0.00     Average packs/day: 1 pack/day for 20.0 years (20.0 ttl pk-yrs)     Types: Cigarettes     Start date: 1952     Quit date: 1972     Years since quittin.6    Smokeless tobacco: Never   Vaping Use    Vaping status: Never Used   Substance and Sexual  Activity    Alcohol use: No     Comment: no alcohol use since 1987    Drug use: No   Other Topics Concern    Caffeine Concern Yes   Social History Narrative    Alyssia is . Father of 5 adult children, multiple grandchildren and great grandchildren. Patient is retired from being a  for the [employer]. Patient lives alone in [place], [place]. Patient enjoys time with his dogs, fans of the Cubs, and basketball.      Social Determinants of Health     Food Insecurity: No Food Insecurity (6/3/2024)    Received from [facility]    Food Insecurity     Currently or in the past 3 months, have you worried your food would run out before you had money to buy more?: No     In the past 12 months, have you run out of food or been unable to get more?: No   Transportation Needs: No Transportation Needs (6/3/2024)    Received from [facility]    Transportation Needs     Medical Transportation Needs?: No    Received from [facility]    Housing Stability              Review of Systems    Positive for stated Chief Complaint: Chest Pain Angina and Abdomen/Flank Pain    Other systems are as noted in HPI.  Constitutional and vital signs reviewed.      All other systems reviewed and negative except as noted above.    Physical Exam     ED Triage Vitals   BP 08/02/24 0514 157/80   Pulse 08/02/24 0514 70   Resp 08/02/24 0519 22   Temp 08/02/24 0514 97.8 °F (36.6 °C)   Temp src 08/02/24 0514 Oral   SpO2 08/02/24 0514 99 %   O2 Device 08/02/24 0514 None (Room air)       Current Vitals:   Vital Signs  BP: 158/86  Pulse: 75  Resp: 15  Temp: 98.1 °F (36.7 °C)  Temp src: Oral  MAP (mmHg): (!) 108    Oxygen Therapy  SpO2: 100 %  O2 Device: None (Room air)            Physical Exam  Vitals and nursing note reviewed.   Constitutional:       Appearance: He is well-developed.   HENT:      Head: Normocephalic and atraumatic.   Eyes:      Extraocular Movements: Extraocular movements intact.    Cardiovascular:      Rate and Rhythm: Normal rate and regular rhythm.      Heart sounds: Normal heart sounds.   Pulmonary:      Effort: Pulmonary effort is normal.      Breath sounds: Normal breath sounds.   Abdominal:      General: There is no distension.      Palpations: Abdomen is soft.      Tenderness: There is no abdominal tenderness.   Musculoskeletal:         General: Normal range of motion.      Cervical back: Normal range of motion.      Right lower leg: No edema.      Left lower leg: No edema.   Skin:     General: Skin is warm.   Neurological:      Mental Status: He is alert.      Comments: No focal deficits       Differential diagnosis includes but is limited to ACS/MI, CHF, dissection, gastroenteritis, GERD, pancreatitis        ED Course     Labs Reviewed   COMP METABOLIC PANEL (14) - Abnormal; Notable for the following components:       Result Value    Glucose 133 (*)     BUN 25 (*)     BUN/CREA Ratio 21.2 (*)     Calculated Osmolality 302 (*)     eGFR-Cr 59 (*)     ALT 8 (*)     All other components within normal limits   TROPONIN I HIGH SENSITIVITY - Abnormal; Notable for the following components:    Troponin I (High Sensitivity) 72 (*)     All other components within normal limits   LIPID PANEL - Abnormal; Notable for the following components:    LDL Cholesterol 100 (*)     All other components within normal limits   TROPONIN I HIGH SENSITIVITY - Abnormal; Notable for the following components:    Troponin I (High Sensitivity) 74 (*)     All other components within normal limits   POCT GLUCOSE - Abnormal; Notable for the following components:    POC Glucose  116 (*)     All other components within normal limits   POCT GLUCOSE - Abnormal; Notable for the following components:    POC Glucose  106 (*)     All other components within normal limits   CBC W/ DIFFERENTIAL - Abnormal; Notable for the following components:    HGB 10.7 (*)     HCT 33.3 (*)     RDW-SD 49.3 (*)     RDW 16.2 (*)     All other  components within normal limits   LIPASE - Normal   BNP (B TYPE NATRIURETIC PEPTIDE) - Normal   D-DIMER - Normal   MAGNESIUM - Normal   CBC WITH DIFFERENTIAL WITH PLATELET    Narrative:     The following orders were created for panel order CBC With Differential With Platelet.  Procedure                               Abnormality         Status                     ---------                               -----------         ------                     CBC W/ DIFFERENTIAL[[id number]]          Abnormal            Final result                 Please view results for these tests on the individual orders.   TROPONIN I HIGH SENSITIVITY     EKG    Rate, intervals and axes as noted on EKG Report.  Rate: 72  Rhythm: Sinus Rhythm  Reading: No STEMI, left bundle branch block present, first-degree AV block present                  XR CHEST AP PORTABLE  (CPT=71045)    Result Date: 8/2/2024  CONCLUSION:   No focal opacity, pleural effusion, or pneumothorax.    Dictated by (CST): Julio Cesar Real MD on 8/02/2024 at 7:47 AM     Finalized by (CST): Julio Cesar Real MD on 8/02/2024 at 7:49 AM                  [facility]     Admission disposition: 8/2/2024  8:17 AM                        I spent a total of 40 minutes of critical care time in obtaining history, performing a physical exam, bedside monitoring of interventions, collecting and interpreting tests and discussion with consultants but not including time spent performing procedures.                Medical Decision Making     Vitals are stable in the ED.  His pain resolved without intervention.  Initial EKG negative for acute ischemia or arrhythmia.  High-sensitivity troponin elevated to 72.  He had a high-sensitivity troponin in June 2024 which was normal.  D-dimer within normal limits.  CMP, lipase and CBC unremarkable for emergent pathologies.    Given age, risk factors and abnormal troponin we will plan for admission and cardiology consultation.  Discussed with JYOTI ALFORD for consultation  and Dr. Moyer for admission.    Problems Addressed:  Chest pain with moderate risk for cardiac etiology: complicated acute illness or injury with systemic symptoms that poses a threat to life or bodily functions    Amount and/or Complexity of Data Reviewed  External Data Reviewed: radiology and notes.     Details: I reviewed discharge summary from [facility] on 6/24 where patient was admitted for syncope, felt to be vasovagal, and discharged to subacute rehab.  Reviewed echo report from 6/3/2024 from outside hospital which showed EF 50 to 55% and first-degree AV block  Labs: ordered. Decision-making details documented in ED Course.  Radiology: ordered and independent interpretation performed. Decision-making details documented in ED Course.  ECG/medicine tests: ordered and independent interpretation performed. Decision-making details documented in ED Course.  Discussion of management or test interpretation with external provider(s): As above    Risk  Decision regarding hospitalization.        Disposition and Plan     Clinical Impression:  1. Chest pain with moderate risk for cardiac etiology         Disposition:  Admit  8/2/2024  8:17 am    Follow-up:  No follow-up provider specified.  We recommend that you schedule follow up care with a primary care provider within the next three months to obtain basic health screening including reassessment of your blood pressure.      Medications Prescribed:  Current Discharge Medication List                            Hospital Problems       Present on Admission  Date Reviewed: 5/8/2024            ICD-10-CM Noted POA    * (Principal) Chest pain with moderate risk for cardiac etiology R07.9 8/2/2024 Unknown

## 2024-08-02 NOTE — ED QUICK NOTES
Orders for admission, patient is aware of plan and ready to go upstairs. Any questions, please call ED RN Monique at extension [phone].     Patient Covid vaccination status: Fully vaccinated     COVID Test Ordered in ED: None    COVID Suspicion at Admission: N/A    Running Infusions:  None    Mental Status/LOC at time of transport: A&O x 4    Other pertinent information:   CIWA score: N/A   NIH score:  N/A     Pt [facility]

## 2024-08-02 NOTE — ED QUICK NOTES
Pt transported to floor via cart accompanied by pt transporter.  Pt left dept in NAD, VSS, A&O x 4.  Pt belongings verified w/ pt & accompanying pt to floor.  Attempted to call floor RN to notify of pts repeat trop due @ 1300, but no answer.

## 2024-08-03 ENCOUNTER — APPOINTMENT (OUTPATIENT)
Dept: CT IMAGING | Facility: HOSPITAL | Age: 89
End: 2024-08-03
Attending: HOSPITALIST
Payer: MEDICARE

## 2024-08-03 LAB
ANION GAP SERPL CALC-SCNC: 5 MMOL/L (ref 0–18)
BASOPHILS # BLD AUTO: 0.01 X10(3) UL (ref 0–0.2)
BASOPHILS NFR BLD AUTO: 0.2 %
BUN BLD-MCNC: 17 MG/DL (ref 9–23)
BUN/CREAT SERPL: 17.3 (ref 10–20)
CALCIUM BLD-MCNC: 9.6 MG/DL (ref 8.7–10.4)
CHLORIDE SERPL-SCNC: 109 MMOL/L (ref 98–112)
CO2 SERPL-SCNC: 29 MMOL/L (ref 21–32)
CREAT BLD-MCNC: 0.98 MG/DL
DEPRECATED RDW RBC AUTO: 49.5 FL (ref 35.1–46.3)
EGFRCR SERPLBLD CKD-EPI 2021: 74 ML/MIN/1.73M2 (ref 60–?)
EOSINOPHIL # BLD AUTO: 0.07 X10(3) UL (ref 0–0.7)
EOSINOPHIL NFR BLD AUTO: 1.3 %
ERYTHROCYTE [DISTWIDTH] IN BLOOD BY AUTOMATED COUNT: 15.9 % (ref 11–15)
GLUCOSE BLD-MCNC: 178 MG/DL (ref 70–99)
HCT VFR BLD AUTO: 31.4 %
HGB BLD-MCNC: 10 G/DL
IMM GRANULOCYTES # BLD AUTO: 0.02 X10(3) UL (ref 0–1)
IMM GRANULOCYTES NFR BLD: 0.4 %
LACTATE SERPL-SCNC: 1.9 MMOL/L (ref 0.5–2)
LYMPHOCYTES # BLD AUTO: 1.17 X10(3) UL (ref 1–4)
LYMPHOCYTES NFR BLD AUTO: 21.7 %
MAGNESIUM SERPL-MCNC: 1.5 MG/DL (ref 1.6–2.6)
MCH RBC QN AUTO: 27 PG (ref 26–34)
MCHC RBC AUTO-ENTMCNC: 31.8 G/DL (ref 31–37)
MCV RBC AUTO: 84.6 FL
MONOCYTES # BLD AUTO: 0.55 X10(3) UL (ref 0.1–1)
MONOCYTES NFR BLD AUTO: 10.2 %
NEUTROPHILS # BLD AUTO: 3.56 X10 (3) UL (ref 1.5–7.7)
NEUTROPHILS # BLD AUTO: 3.56 X10(3) UL (ref 1.5–7.7)
NEUTROPHILS NFR BLD AUTO: 66.2 %
OSMOLALITY SERPL CALC.SUM OF ELEC: 302 MOSM/KG (ref 275–295)
PLATELET # BLD AUTO: 186 10(3)UL (ref 150–450)
POTASSIUM SERPL-SCNC: 3.6 MMOL/L (ref 3.5–5.1)
POTASSIUM SERPL-SCNC: 3.6 MMOL/L (ref 3.5–5.1)
POTASSIUM SERPL-SCNC: 4.4 MMOL/L (ref 3.5–5.1)
RBC # BLD AUTO: 3.71 X10(6)UL
SODIUM SERPL-SCNC: 143 MMOL/L (ref 136–145)
WBC # BLD AUTO: 5.4 X10(3) UL (ref 4–11)

## 2024-08-03 PROCEDURE — 74176 CT ABD & PELVIS W/O CONTRAST: CPT | Performed by: HOSPITALIST

## 2024-08-03 PROCEDURE — 99232 SBSQ HOSP IP/OBS MODERATE 35: CPT | Performed by: HOSPITALIST

## 2024-08-03 RX ORDER — MAGNESIUM OXIDE 400 MG/1
800 TABLET ORAL ONCE
Status: COMPLETED | OUTPATIENT
Start: 2024-08-03 | End: 2024-08-03

## 2024-08-03 RX ORDER — POTASSIUM CHLORIDE 20 MEQ/1
40 TABLET, EXTENDED RELEASE ORAL EVERY 4 HOURS
Status: COMPLETED | OUTPATIENT
Start: 2024-08-03 | End: 2024-08-03

## 2024-08-03 NOTE — PLAN OF CARE
Problem: CARDIOVASCULAR - ADULT  Goal: Maintains optimal cardiac output and hemodynamic stability  Description: INTERVENTIONS:  - Monitor vital signs, rhythm, and trends  - Monitor for bleeding, hypotension and signs of decreased cardiac output  - Evaluate effectiveness of vasoactive medications to optimize hemodynamic stability  - Monitor arterial and/or venous puncture sites for bleeding and/or hematoma  - Assess quality of pulses, skin color and temperature  - Assess for signs of decreased coronary artery perfusion - ex. Angina  - Evaluate fluid balance, assess for edema, trend weights  Outcome: Progressing  Goal: Absence of cardiac arrhythmias or at baseline  Description: INTERVENTIONS:  - Continuous cardiac monitoring, monitor vital signs, obtain 12 lead EKG if indicated  - Evaluate effectiveness of antiarrhythmic and heart rate control medications as ordered  - Initiate emergency measures for life threatening arrhythmias  - Monitor electrolytes and administer replacement therapy as ordered  Outcome: Progressing     Problem: PAIN - ADULT  Goal: Verbalizes/displays adequate comfort level or patient's stated pain goal  Description: INTERVENTIONS:  - Encourage pt to monitor pain and request assistance  - Assess pain using appropriate pain scale  - Administer analgesics based on type and severity of pain and evaluate response  - Implement non-pharmacological measures as appropriate and evaluate response  - Consider cultural and social influences on pain and pain management  - Manage/alleviate anxiety  - Utilize distraction and/or relaxation techniques  - Monitor for opioid side effects  - Notify MD/LIP if interventions unsuccessful or patient reports new pain  - Anticipate increased pain with activity and pre-medicate as appropriate  Outcome: Progressing

## 2024-08-03 NOTE — PROGRESS NOTES
[facility]  part of [facility]    Progress Note    Leonard Shay Patient Status:  Observation    3/20/1935 MRN [id number]   Location [facility] 3W/SW Attending Valerie Clark MD   Hosp Day # 0 PCP Ayden Samano MD       Subjective:   Leonard Shay denies abdominal pain. Afebrile. No chest pain or sob.     Objective:   Blood pressure 153/88, pulse 74, temperature 97.5 °F (36.4 °C), temperature source Oral, resp. rate 15, height 5' 9\" (1.753 m), weight 155 lb (70.3 kg), SpO2 99%.    Physical Exam:    General: No acute distress.   Respiratory: Clear to auscultation bilaterally. No wheezes. No rhonchi.  Cardiovascular: S1, S2. Regular rate and rhythm. No murmurs, rubs or gallops.   Abdomen: Soft, nontender, nondistended.  Positive bowel sounds. No rebound or guarding.  Neurologic: No focal neurological deficits.   Musculoskeletal: Moves all extremities.  Extremities: No edema.    Results:     Lab Results   Component Value Date    WBC 5.4 2024    HGB 10.0 (L) 2024    HCT 31.4 (L) 2024    .0 2024    CREATSERUM 0.98 2024    BUN 17 2024     2024    K 3.6 2024    K 3.6 2024     2024    CO2 29.0 2024     (H) 2024    CA 9.6 2024    ALB 3.9 2024    ALKPHO 75 2024    BILT 0.2 2024    TP 7.2 2024    AST 14 2024    ALT 8 (L) 2024    PTT 34.8 2021    INR 1.09 2021    PT 13.5 05/10/2011    TSH 3.220 2022    LIP 28 2024    PSA <0.04 2024    DDIMER 0.65 2024    ESRML 60 (H) 2022    CRP 0.48 (H) 2022    MG 1.5 (L) 2024       CT ABDOMEN+PELVIS(CPT=74176)    Result Date: 8/3/2024  CONCLUSION:   1. Circumferential bladder wall thickening, which may be secondary to cystitis or chronic outlet obstruction. 2. No hydronephrosis or urinary calculus. 3. No bowel obstruction, abnormal bowel wall thickening, or  acute diverticulitis.  The appendix is surgically absent. 4. Moderate colonic stool burden, which can be seen in the setting of constipation. 5. Prostatomegaly. 6. Lesser incidental findings described above.     Dictated by (CST): Julio Cesar Real MD on 8/03/2024 at 12:20 PM     Finalized by (CST): Julio Cesar Real MD on 8/03/2024 at 12:26 PM          CARD NUC STRESS TEST LEXISCAN (CPT 67134/70351/)    Result Date: 8/2/2024  CONCLUSION:  1. Normal myocardial perfusion study.  Artifact from intestinal uptake. 2. EF calculated 60%. 3. EKG is indeterminate due to left bundle-branch block.      Dictated by (CST): Kennedy Bond MD on 8/02/2024 at 2:29 PM     Finalized by (CST): Kennedy Bond MD on 8/02/2024 at 2:33 PM          XR CHEST AP PORTABLE  (CPT=71045)    Result Date: 8/2/2024  CONCLUSION:   No focal opacity, pleural effusion, or pneumothorax.    Dictated by (CST): Julio Cesar Real MD on 8/02/2024 at 7:47 AM     Finalized by (CST): Julio Cesar Real MD on 8/02/2024 at 7:49 AM         EKG 12 Lead    Result Date: 8/3/2024  Sinus rhythm with 1st degree A-V block Left axis deviation Left bundle branch block Abnormal ECG When compared with ECG of 02-AUG-2024 09:48, Nonspecific T wave abnormality now evident in Inferior leads    EKG 12 Lead    Result Date: 8/2/2024  Sinus rhythm with 1st degree A-V block Left axis deviation Left bundle branch block Abnormal ECG When compared with ECG of 02-AUG-2024 05:12, T wave inversion less evident in Lateral leads Confirmed by KISHOR AMBRIZ ([id number]) on 8/2/2024 3:54:10 PM    EKG 12 Lead    Result Date: 8/2/2024  Sinus rhythm with 1st degree A-V block Left axis deviation Left bundle branch block Abnormal ECG No previous ECGs found in Muse Confirmed by KISHOR AMBRZI ([id number]) on 8/2/2024 3:52:02 PM    potassium chloride  40 mEq Oral Q4H    heparin  5,000 Units Subcutaneous Q8H VALENTINO    docusate sodium  100 mg Oral BID    sennosides  8.6 mg Oral BID    polyethylene glycol (PEG  3350)  17 g Oral BID    amLODIPine  5 mg Oral Daily    aspirin  81 mg Oral Daily    donepezil  5 mg Oral Nightly    ferrous sulfate  325 mg Oral Daily with breakfast    pravastatin  20 mg Oral Nightly    pantoprazole  40 mg Oral Before breakfast    tamsulosin  0.4 mg Oral Daily    lisinopril (Prinivil; Zestril) 20 mg, hydroCHLOROthiazide 12.5 mg for Zestoretic 20-12.5 (EEH only)   Oral Daily       acetaminophen **OR** HYDROcodone-acetaminophen **OR** HYDROcodone-acetaminophen    ondansetron    metoclopramide      Assessment and Plan:      Chest pain with moderate risk for cardiac etiology    Elevated Troponin  - Cards on board  - lexiscan stress test WNL    Abdominal pain  - CT scan shows moderate stool burden  - stool softtners     Sinus Bradycarida  - monitor on tele overnight  - probable EP eval tomorrow     DM2  - SSI low dose  - accuechecks      Dyslipidemia  - statin      HTN  - resume home blood pressure meds      DVT proph- heparin     Full code            Quality:  DVT Prophylaxis: Heparin  CODE status: Full    MDM .high      PONCE ALBARADO MD  08/03/24

## 2024-08-03 NOTE — PROGRESS NOTES
[facility] Cardiology Progress Note    Leonard Shay Patient Status:  Observation    3/20/1935 MRN [id number]   Location [facility] 3W/SW Attending Valerie Clark MD   Hosp Day # 0 PCP Ayden Samano MD     Subjective:  Brday with hypotension briefly this am  Complaints of abdominal pain and is tender to palpation  Denies chest pain.   Conversational, but \"forgetful\"    Objective:  BP (!) 77/44 (BP Location: Left arm)   Pulse 85   Temp 98.9 °F (37.2 °C) (Oral)   Resp 16   Ht 5' 9\" (1.753 m)   Wt 155 lb (70.3 kg)   SpO2 98%   BMI 22.89 kg/m²     Telemetry: nsr. LBBB, int AV block as below       Intake/Output:    Intake/Output Summary (Last 24 hours) at 8/3/2024 0658  Last data filed at 8/3/2024 0504  Gross per 24 hour   Intake 948.33 ml   Output 850 ml   Net 98.33 ml       Last 3 Weights   24 0514 155 lb (70.3 kg)   24 1625 143 lb (64.9 kg)   24 1319 158 lb (71.7 kg)       Labs:  Recent Labs   Lab 24  0601   *   BUN 25*   CREATSERUM 1.18   EGFRCR 59*   CA 10.2      K 3.7      CO2 29.0     Recent Labs   Lab 24  0601   RBC 4.01   HGB 10.7*   HCT 33.3*   MCV 83.0   MCH 26.7   MCHC 32.1   RDW 16.2*   NEPRELIM 3.20   WBC 4.8   .0         Recent Labs   Lab 24  0601 24  0735 24  1500   TROPHS 72* 74* 75*       Diagnostics:             Physical Exam:    [facility] Echo 2024  ONCLUSIONS     SUMMARY:     1. Left ventricle: The cavity size is normal. Wall thickness is mildly      increased. Systolic function is at the lower limits of normal. The      estimated ejection fraction is 50-55%. Due to first degree      atrioventricular block, there is fusion of early and atrial contributions      to ventricular filling. Diastolic function cannot be assessed on current      examination. There is no evidence of a thrombus.   2. Regional wall motion abnormality: Dyskinesis of the basal-mid      anteroseptal, basal-mid  inferoseptal, and apical septal myocardium.   3. Ventricular septum: Septal motion is dyssynergic. These changes are      consistent with a left bundle branch block.   4. Aortic valve: There is mild regurgitation.   IMPRESSION:  No previous study was available for comparison.     ----------------------------------------------------------------------------   Physical Exam  Cardiovascular:      Rate and Rhythm: Regular rhythm. Tachycardia present.   Pulmonary:      Effort: Pulmonary effort is normal.   Abdominal:      Tenderness: There is abdominal tenderness.      Comments: Gaurding all 4 quadrants with light palpation    Musculoskeletal:      Right lower leg: No edema.      Left lower leg: No edema.   Skin:     General: Skin is warm and dry.   Neurological:      Mental Status: He is alert.         Medications:   sodium chloride  500 mL Intravenous Once    heparin  5,000 Units Subcutaneous Q8H VALENTINO    docusate sodium  100 mg Oral BID    sennosides  8.6 mg Oral BID    polyethylene glycol (PEG 3350)  17 g Oral BID    amLODIPine  5 mg Oral Daily    aspirin  81 mg Oral Daily    donepezil  5 mg Oral Nightly    ferrous sulfate  325 mg Oral Daily with breakfast    pravastatin  20 mg Oral Nightly    pantoprazole  40 mg Oral Before breakfast    tamsulosin  0.4 mg Oral Daily    lisinopril (Prinivil; Zestril) 20 mg, hydroCHLOROthiazide 12.5 mg for Zestoretic 20-12.5 (EE[facility] only)   Oral Daily      sodium chloride 50 mL/hr at 08/02/24 0392       Assessment:        Assessment     Chest pain- s/p blaise stress with no reversible ischemia, EF 60%; Echo from June as above, now with abdominal pain   Troponin Elevation- non MI, flat @ ~ 70  Chronic LBBB  Transient AV block- ? Associated hyoptension, not on AV alma blockign agents.    HTN-hypotensive this am   DM2  Dementia     Plan:    Page for bradycardia and hypotension, with associated abdominal pain.  Tele showed some Mobitz I AV block with some junctional rhythm as well.   Bp low at  the time in 70s but quickly resolved after saline 500ml. ? Vagal event from abodminal pain, but hard to say  Hold am antihypertensives, not on any AV alma blockers at [facility]   12ld EKG uncjhanged in comparison  Follow on tele here, if recurrent then would plan for EP copnsult Monday.   If not, then plan for outpatient MCT monitor.   Will follow on tele over weekend and see Monday. Please call with any recurrence of significant bradycardia    Kristina Reynoso, APRN  8/3/2024  6:58 AM  Ph [phone] ([place])  Ph [phone] ([place])        Patient seen and examined independently.  Note reviewed and labs reviewed.  Agree to the assessment and plan with the following additions/changes.  Entire medical decision making & plan performed by myself.      A/P:  Transient bradycardia in the setting of ABD pain  Mobitz I on tele. Probably vagal.   Will see again on Monday and plan to sign off if no recurrence    LOC L2    Zoran Sanches MD  8/4/2024  Interventional Cardiology  [facility]  =======================================================

## 2024-08-03 NOTE — PLAN OF CARE
Problem: CARDIOVASCULAR - ADULT  Goal: Maintains optimal cardiac output and hemodynamic stability  Description: INTERVENTIONS:  - Monitor vital signs, rhythm, and trends  - Monitor for bleeding, hypotension and signs of decreased cardiac output  - Evaluate effectiveness of vasoactive medications to optimize hemodynamic stability  - Monitor arterial and/or venous puncture sites for bleeding and/or hematoma  - Assess quality of pulses, skin color and temperature  - Assess for signs of decreased coronary artery perfusion - ex. Angina  - Evaluate fluid balance, assess for edema, trend weights  Outcome: Progressing  Goal: Absence of cardiac arrhythmias or at baseline  Description: INTERVENTIONS:  - Continuous cardiac monitoring, monitor vital signs, obtain 12 lead EKG if indicated  - Evaluate effectiveness of antiarrhythmic and heart rate control medications as ordered  - Initiate emergency measures for life threatening arrhythmias  - Monitor electrolytes and administer replacement therapy as ordered  Outcome: Progressing     Problem: PAIN - ADULT  Goal: Verbalizes/displays adequate comfort level or patient's stated pain goal  Description: INTERVENTIONS:  - Encourage pt to monitor pain and request assistance  - Assess pain using appropriate pain scale  - Administer analgesics based on type and severity of pain and evaluate response  - Implement non-pharmacological measures as appropriate and evaluate response  - Consider cultural and social influences on pain and pain management  - Manage/alleviate anxiety  - Utilize distraction and/or relaxation techniques  - Monitor for opioid side effects  - Notify MD/LIP if interventions unsuccessful or patient reports new pain  - Anticipate increased pain with activity and pre-medicate as appropriate  Outcome: Progressing     Problem: SAFETY ADULT - FALL  Goal: Free from fall injury  Description: INTERVENTIONS:  - Assess pt frequently for physical needs  - Identify cognitive and  physical deficits and behaviors that affect risk of falls.  - [place] fall precautions as indicated by assessment.  - Educate pt/family on patient safety including physical limitations  - Instruct pt to call for assistance with activity based on assessment  - Modify environment to reduce risk of injury  - Provide assistive devices as appropriate  - Consider OT/PT consult to assist with strengthening/mobility  - Encourage toileting schedule  Outcome: Progressing

## 2024-08-04 LAB
ANION GAP SERPL CALC-SCNC: 3 MMOL/L (ref 0–18)
BUN BLD-MCNC: 16 MG/DL (ref 9–23)
BUN/CREAT SERPL: 17 (ref 10–20)
CALCIUM BLD-MCNC: 9.8 MG/DL (ref 8.7–10.4)
CHLORIDE SERPL-SCNC: 109 MMOL/L (ref 98–112)
CO2 SERPL-SCNC: 31 MMOL/L (ref 21–32)
CREAT BLD-MCNC: 0.94 MG/DL
DEPRECATED RDW RBC AUTO: 48.7 FL (ref 35.1–46.3)
EGFRCR SERPLBLD CKD-EPI 2021: 78 ML/MIN/1.73M2 (ref 60–?)
ERYTHROCYTE [DISTWIDTH] IN BLOOD BY AUTOMATED COUNT: 15.9 % (ref 11–15)
GLUCOSE BLD-MCNC: 117 MG/DL (ref 70–99)
HCT VFR BLD AUTO: 30.2 %
HGB BLD-MCNC: 9.7 G/DL
MAGNESIUM SERPL-MCNC: 1.7 MG/DL (ref 1.6–2.6)
MCH RBC QN AUTO: 26.8 PG (ref 26–34)
MCHC RBC AUTO-ENTMCNC: 32.1 G/DL (ref 31–37)
MCV RBC AUTO: 83.4 FL
OSMOLALITY SERPL CALC.SUM OF ELEC: 298 MOSM/KG (ref 275–295)
PLATELET # BLD AUTO: 205 10(3)UL (ref 150–450)
POTASSIUM SERPL-SCNC: 4.3 MMOL/L (ref 3.5–5.1)
RBC # BLD AUTO: 3.62 X10(6)UL
SODIUM SERPL-SCNC: 143 MMOL/L (ref 136–145)
WBC # BLD AUTO: 3.3 X10(3) UL (ref 4–11)

## 2024-08-04 PROCEDURE — 99232 SBSQ HOSP IP/OBS MODERATE 35: CPT | Performed by: HOSPITALIST

## 2024-08-04 RX ORDER — MAGNESIUM OXIDE 400 MG/1
400 TABLET ORAL ONCE
Status: COMPLETED | OUTPATIENT
Start: 2024-08-04 | End: 2024-08-04

## 2024-08-04 NOTE — PLAN OF CARE
Problem: CARDIOVASCULAR - ADULT  Goal: Maintains optimal cardiac output and hemodynamic stability  Description: INTERVENTIONS:  - Monitor vital signs, rhythm, and trends  - Monitor for bleeding, hypotension and signs of decreased cardiac output  - Evaluate effectiveness of vasoactive medications to optimize hemodynamic stability  - Monitor arterial and/or venous puncture sites for bleeding and/or hematoma  - Assess quality of pulses, skin color and temperature  - Assess for signs of decreased coronary artery perfusion - ex. Angina  - Evaluate fluid balance, assess for edema, trend weights  Outcome: Progressing  Goal: Absence of cardiac arrhythmias or at baseline  Description: INTERVENTIONS:  - Continuous cardiac monitoring, monitor vital signs, obtain 12 lead EKG if indicated  - Evaluate effectiveness of antiarrhythmic and heart rate control medications as ordered  - Initiate emergency measures for life threatening arrhythmias  - Monitor electrolytes and administer replacement therapy as ordered  Outcome: Progressing     Problem: PAIN - ADULT  Goal: Verbalizes/displays adequate comfort level or patient's stated pain goal  Description: INTERVENTIONS:  - Encourage pt to monitor pain and request assistance  - Assess pain using appropriate pain scale  - Administer analgesics based on type and severity of pain and evaluate response  - Implement non-pharmacological measures as appropriate and evaluate response  - Consider cultural and social influences on pain and pain management  - Manage/alleviate anxiety  - Utilize distraction and/or relaxation techniques  - Monitor for opioid side effects  - Notify MD/LIP if interventions unsuccessful or patient reports new pain  - Anticipate increased pain with activity and pre-medicate as appropriate  Outcome: Progressing     Problem: SAFETY ADULT - FALL  Goal: Free from fall injury  Description: INTERVENTIONS:  - Assess pt frequently for physical needs  - Identify cognitive and  physical deficits and behaviors that affect risk of falls.  - [place] fall precautions as indicated by assessment.  - Educate pt/family on patient safety including physical limitations  - Instruct pt to call for assistance with activity based on assessment  - Modify environment to reduce risk of injury  - Provide assistive devices as appropriate  - Consider OT/PT consult to assist with strengthening/mobility  - Encourage toileting schedule  Outcome: Progressing     Problem: Patient Centered Care  Goal: Patient preferences are identified and integrated in the patient's plan of care  Description: Interventions:  - What would you like us to know as we care for you?   - Provide timely, complete, and accurate information to patient/family  - Incorporate patient and family knowledge, values, beliefs, and cultural backgrounds into the planning and delivery of care  - Encourage patient/family to participate in care and decision-making at the level they choose  - Honor patient and family perspectives and choices  Outcome: Progressing

## 2024-08-05 VITALS
TEMPERATURE: 98 F | BODY MASS INDEX: 22.96 KG/M2 | SYSTOLIC BLOOD PRESSURE: 124 MMHG | OXYGEN SATURATION: 98 % | HEART RATE: 76 BPM | WEIGHT: 155 LBS | DIASTOLIC BLOOD PRESSURE: 78 MMHG | HEIGHT: 69 IN | RESPIRATION RATE: 18 BRPM

## 2024-08-05 LAB
ANION GAP SERPL CALC-SCNC: 6 MMOL/L (ref 0–18)
ATRIAL RATE: 68 BPM
BUN BLD-MCNC: 15 MG/DL (ref 9–23)
BUN/CREAT SERPL: 16.7 (ref 10–20)
CALCIUM BLD-MCNC: 10.3 MG/DL (ref 8.7–10.4)
CHLORIDE SERPL-SCNC: 107 MMOL/L (ref 98–112)
CO2 SERPL-SCNC: 29 MMOL/L (ref 21–32)
CREAT BLD-MCNC: 0.9 MG/DL
EGFRCR SERPLBLD CKD-EPI 2021: 82 ML/MIN/1.73M2 (ref 60–?)
GLUCOSE BLD-MCNC: 103 MG/DL (ref 70–99)
MAGNESIUM SERPL-MCNC: 2 MG/DL (ref 1.6–2.6)
OSMOLALITY SERPL CALC.SUM OF ELEC: 295 MOSM/KG (ref 275–295)
P AXIS: 49 DEGREES
P-R INTERVAL: 320 MS
POTASSIUM SERPL-SCNC: 4 MMOL/L (ref 3.5–5.1)
Q-T INTERVAL: 484 MS
QRS DURATION: 138 MS
QTC CALCULATION (BEZET): 514 MS
R AXIS: -35 DEGREES
SODIUM SERPL-SCNC: 142 MMOL/L (ref 136–145)
T AXIS: 181 DEGREES
VENTRICULAR RATE: 68 BPM

## 2024-08-05 PROCEDURE — 99238 HOSP IP/OBS DSCHRG MGMT 30/<: CPT | Performed by: HOSPITALIST

## 2024-08-05 NOTE — OCCUPATIONAL THERAPY NOTE
OCCUPATIONAL THERAPY EVALUATION - INPATIENT     Room Number: COF12/COF12-A  Evaluation Date: 8/5/2024  Type of Evaluation: Initial  Presenting Problem: chest pain, abdominal pain    Physician Order: IP Consult to Occupational Therapy  Reason for Therapy: ADL/IADL Dysfunction and Discharge Planning    OCCUPATIONAL THERAPY ASSESSMENT   Patient is a 89 year old male admitted 8/2/2024 for chest pain, abdominal pain.  ?hx of dementia?  Recent hospital stay at OSH for syncopal episode. 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  Patient is currently functioning below baseline with ADLs and functional mobility.  Patient is requiring CGA-Min A as a result of the following impairments: limited reach, fatigue, mild confusion (?baseline?). Occupational Therapy will continue to follow for duration of hospitalization.    Patient will benefit from continued skilled OT Services at discharge to promote prior level of function and safety with additional support and return home with home health OT    PLAN  OT Treatment Plan: Balance activities;Energy conservation/work simplification techniques;ADL training;Functional transfer training;Endurance training;Equipment eval/education;Compensatory technique education  OT Device Recommendations: TBD    OCCUPATIONAL THERAPY MEDICAL/SOCIAL HISTORY   Problem List  Principal Problem:    Chest pain with moderate risk for cardiac etiology  Active Problems:    Chest pain    HOME SITUATION  Type of Home: House  Lives With: Son (per chart, pt has been staying with son since rehab stay about 1 month ago, pt poor historian and unable to recall home set up)  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 I'm tired\"  \"I'm confused, what do  you want me to do again?\"     OCCUPATIONAL THERAPY EXAMINATION    OBJECTIVE  Precautions: Bed/chair  alarm  Fall Risk: Standard fall risk    PAIN ASSESSMENT  Ratin    ACTIVITY TOLERANCE  Pulse: 74                      O2 SATURATIONS       COGNITION  Followed one-step cues with additional time  Oriented to self , decreased orientation to place and reason for visit    Communication: cues to make needs known (use washroom)     Behavioral/Emotional/Social: pleasant     RANGE OF MOTION   Upper extremity ROM is within functional limits     STRENGTH ASSESSMENT  Upper extremity strength is within functional limits       ACTIVITIES OF DAILY LIVING ASSESSMENT  AM-PAC ‘6-Clicks’ Inpatient Daily Activity Short Form  How much help from another person does the patient currently need…  -   Putting on and taking off regular lower body clothing?: A Lot  -   Bathing (including washing, rinsing, drying)?: A Little  -   Toileting, which includes using toilet, bedpan or urinal? : A Little  -   Putting on and taking off regular upper body clothing?: A Little  -   Taking care of personal grooming such as brushing teeth?: A Little  -   Eating meals?: None    AM-PAC Score:  Score: 18  Approx Degree of Impairment: 46.65%  Standardized Score (AM-PAC Scale): 38.66  CMS Modifier (G-Code): CK    FUNCTIONAL TRANSFER ASSESSMENT  Sit to Stand: Edge of Bed  Edge of Bed: Contact Guard Assist  Toilet Transfer: Contact Guard Assist    BED MOBILITY  Supine to Sit : Contact Guard Assist    FUNCTIONAL ADL ASSESSMENT  Grooming Standing: Contact Guard Assist  LB Dressing Seated: Minimal Assist  LB Dressing Standing: Contact Guard Assist    Skilled Therapy Provided: RN cleared pt for participation in occupational therapy session. Upon arrival, pt was supine in bed and agreeable to activity. No family was present during session. Pt benefited from additional time, verbal cues, RW to maximize participation.    Pt completed ADLs and functional mobility with overall CGA - Min A. Pt with initial confusion upon waking up , but following cues. Pt with  difficulty providing prior level of function. Vitals stable with activity. Pt with decreased awareness of need to use washroom, increased awareness with direct cues.  Pt completed toileting routine including silverio care and donning underwear with up to Min A, no loss of balance.     EDUCATION PROVIDED  Patient: Role of Occupational Therapy; Plan of Care; Functional Transfer Techniques; Compensatory ADL Techniques  Patient's Response to Education: Verbalized Understanding; Returned Demonstration    The patient's Approx Degree of Impairment: 46.65% has been calculated based on documentation in the [facility] '6 clicks' Inpatient Daily Activity Short Form.  Research supports that patients with this level of impairment may benefit from HHOT.  Final disposition will be made by interdisciplinary medical team.     Patient End of Session: Up in chair;Needs met;Call light within reach;RN aware of session/findings;Alarm set;With  staff    OT Goals  Patients self stated goal is: did not state     Patient will complete toilet transfer with Mod Ind    Comment:     Patient will complete [facility] routine in standing at the sink with Mod I  Comment:      Patient will complete item retrieval with Mod I  Comment:   Patient will complete LE dressing with Mod I   Comment:          Goals  on:  24  Frequency: 3-5x/w     Patient Evaluation Complexity Level:   Occupational Profile/Medical History LOW - Brief history including review of medical or therapy records    Specific performance deficits impacting engagement in ADL/IADL LOW  1 - 3 performance deficits    Client Assessment/Performance Deficits LOW - No comorbidities nor modifications of tasks    Clinical Decision Making LOW - Analysis of occupational profile, problem-focused assessments, limited treatment options    Overall Complexity LOW     OT Session Time: 25 minutes  Self-Care Home Management: 15 minutes  Therapeutic Activity: 10 minutes

## 2024-08-05 NOTE — PROGRESS NOTES
[facility]  part of [facility]    Progress Note    Leonard Shay Patient Status:  Observation    3/20/1935 MRN [id number]   Location [facility] 3W/SW Attending Valerie Clark MD   Hosp Day # 0 PCP Ayden Samano MD       Subjective:   Leonard Shay is resting in bed. Heart rate stable.     Objective:   Blood pressure 128/83, pulse 83, temperature 98.4 °F (36.9 °C), temperature source Oral, resp. rate 18, height 5' 9\" (1.753 m), weight 155 lb (70.3 kg), SpO2 99%.    Physical Exam:    General: No acute distress.   Respiratory: Clear to auscultation bilaterally. No wheezes. No rhonchi.  Cardiovascular: S1, S2. Regular rate and rhythm. No murmurs, rubs or gallops.   Abdomen: Soft, nontender, nondistended.  Positive bowel sounds. No rebound or guarding.  Neurologic: No focal neurological deficits.   Musculoskeletal: Moves all extremities.  Extremities: No edema.    Results:     Lab Results   Component Value Date    WBC 3.3 (L) 2024    HGB 9.7 (L) 2024    HCT 30.2 (L) 2024    .0 2024    CREATSERUM 0.90 2024    BUN 15 2024     2024    K 4.0 2024     2024    CO2 29.0 2024     (H) 2024    CA 10.3 2024    ALB 3.9 2024    ALKPHO 75 2024    BILT 0.2 2024    TP 7.2 2024    AST 14 2024    ALT 8 (L) 2024    PTT 34.8 2021    INR 1.09 2021    PT 13.5 05/10/2011    TSH 3.220 2022    LIP 28 2024    PSA <0.04 2024    DDIMER 0.65 2024    ESRML 60 (H) 2022    CRP 0.48 (H) 2022    MG 2.0 2024       CT ABDOMEN+PELVIS(CPT=74176)    Result Date: 8/3/2024  CONCLUSION:   1. Circumferential bladder wall thickening, which may be secondary to cystitis or chronic outlet obstruction. 2. No hydronephrosis or urinary calculus. 3. No bowel obstruction, abnormal bowel wall thickening, or acute diverticulitis.  The appendix is  surgically absent. 4. Moderate colonic stool burden, which can be seen in the setting of constipation. 5. Prostatomegaly. 6. Lesser incidental findings described above.     Dictated by (CST): Julio Cesar Real MD on 8/03/2024 at 12:20 PM     Finalized by (CST): Julio Cesar Real MD on 8/03/2024 at 12:26 PM          CARD NUC STRESS TEST LEXISCAN (CPT 42559/18508/)    Result Date: 8/2/2024  CONCLUSION:  1. Normal myocardial perfusion study.  Artifact from intestinal uptake. 2. EF calculated 60%. 3. EKG is indeterminate due to left bundle-branch block.      Dictated by (CST): Kennedy Bond MD on 8/02/2024 at 2:29 PM     Finalized by (CST): Kennedy Bond MD on 8/02/2024 at 2:33 PM              heparin  5,000 Units Subcutaneous Q8H VALENTINO    docusate sodium  100 mg Oral BID    sennosides  8.6 mg Oral BID    polyethylene glycol (PEG 3350)  17 g Oral BID    amLODIPine  5 mg Oral Daily    aspirin  81 mg Oral Daily    donepezil  5 mg Oral Nightly    ferrous sulfate  325 mg Oral Daily with breakfast    pravastatin  20 mg Oral Nightly    pantoprazole  40 mg Oral Before breakfast    tamsulosin  0.4 mg Oral Daily    lisinopril (Prinivil; Zestril) 20 mg, hydroCHLOROthiazide 12.5 mg for Zestoretic 20-12.5 ([facility] only)   Oral Daily       acetaminophen **OR** HYDROcodone-acetaminophen **OR** HYDROcodone-acetaminophen    ondansetron    metoclopramide      Assessment and Plan:      Chest pain with moderate risk for cardiac etiology    Elevated Troponin  - Cards on board  - lexiscan stress test WNL  - will have EP assess patient tomorrow     Abdominal pain  - CT scan shows moderate stool burden  - stool softtners     Sinus Bradycarida  - monitor on tele overnight  - probable EP eval tomorrow     DM2  - SSI low dose  - accuechecks      Dyslipidemia  - statin      HTN  - resume home blood pressure meds      DVT proph- heparin     Full code            Quality:  DVT Prophylaxis: Heparin  CODE status: Full    MDM .high PONCE ALBARADO  MD  08/04/24

## 2024-08-05 NOTE — PLAN OF CARE
Patient alert and oriented x2. Patient denies pain or shortness of breath. PT/OT eval today. Patient with bowel movement today. Plan is for discharge home this afternoon. Patient and family updated on plan of care.     Problem: CARDIOVASCULAR - ADULT  Goal: Maintains optimal cardiac output and hemodynamic stability  Description: INTERVENTIONS:  - Monitor vital signs, rhythm, and trends  - Monitor for bleeding, hypotension and signs of decreased cardiac output  - Evaluate effectiveness of vasoactive medications to optimize hemodynamic stability  - Monitor arterial and/or venous puncture sites for bleeding and/or hematoma  - Assess quality of pulses, skin color and temperature  - Assess for signs of decreased coronary artery perfusion - ex. Angina  - Evaluate fluid balance, assess for edema, trend weights  Outcome: Progressing  Goal: Absence of cardiac arrhythmias or at baseline  Description: INTERVENTIONS:  - Continuous cardiac monitoring, monitor vital signs, obtain 12 lead EKG if indicated  - Evaluate effectiveness of antiarrhythmic and heart rate control medications as ordered  - Initiate emergency measures for life threatening arrhythmias  - Monitor electrolytes and administer replacement therapy as ordered  Outcome: Progressing     Problem: PAIN - ADULT  Goal: Verbalizes/displays adequate comfort level or patient's stated pain goal  Description: INTERVENTIONS:  - Encourage pt to monitor pain and request assistance  - Assess pain using appropriate pain scale  - Administer analgesics based on type and severity of pain and evaluate response  - Implement non-pharmacological measures as appropriate and evaluate response  - Consider cultural and social influences on pain and pain management  - Manage/alleviate anxiety  - Utilize distraction and/or relaxation techniques  - Monitor for opioid side effects  - Notify MD/LIP if interventions unsuccessful or patient reports new pain  - Anticipate increased pain with  activity and pre-medicate as appropriate  Outcome: Progressing     Problem: SAFETY ADULT - FALL  Goal: Free from fall injury  Description: INTERVENTIONS:  - Assess pt frequently for physical needs  - Identify cognitive and physical deficits and behaviors that affect risk of falls.  - [place] fall precautions as indicated by assessment.  - Educate pt/family on patient safety including physical limitations  - Instruct pt to call for assistance with activity based on assessment  - Modify environment to reduce risk of injury  - Provide assistive devices as appropriate  - Consider OT/PT consult to assist with strengthening/mobility  - Encourage toileting schedule  Outcome: Progressing     Problem: Patient Centered Care  Goal: Patient preferences are identified and integrated in the patient's plan of care  Description: Interventions:  - What would you like us to know as we care for you? I live with my family  - Provide timely, complete, and accurate information to patient/family  - Incorporate patient and family knowledge, values, beliefs, and cultural backgrounds into the planning and delivery of care  - Encourage patient/family to participate in care and decision-making at the level they choose  - Honor patient and family perspectives and choices  Outcome: Progressing

## 2024-08-05 NOTE — PLAN OF CARE
Pt a/ox3. No complaints overnight.  Problem: CARDIOVASCULAR - ADULT  Goal: Maintains optimal cardiac output and hemodynamic stability  Description: INTERVENTIONS:  - Monitor vital signs, rhythm, and trends  - Monitor for bleeding, hypotension and signs of decreased cardiac output  - Evaluate effectiveness of vasoactive medications to optimize hemodynamic stability  - Monitor arterial and/or venous puncture sites for bleeding and/or hematoma  - Assess quality of pulses, skin color and temperature  - Assess for signs of decreased coronary artery perfusion - ex. Angina  - Evaluate fluid balance, assess for edema, trend weights  Outcome: Progressing  Goal: Absence of cardiac arrhythmias or at baseline  Description: INTERVENTIONS:  - Continuous cardiac monitoring, monitor vital signs, obtain 12 lead EKG if indicated  - Evaluate effectiveness of antiarrhythmic and heart rate control medications as ordered  - Initiate emergency measures for life threatening arrhythmias  - Monitor electrolytes and administer replacement therapy as ordered  Outcome: Progressing     Problem: PAIN - ADULT  Goal: Verbalizes/displays adequate comfort level or patient's stated pain goal  Description: INTERVENTIONS:  - Encourage pt to monitor pain and request assistance  - Assess pain using appropriate pain scale  - Administer analgesics based on type and severity of pain and evaluate response  - Implement non-pharmacological measures as appropriate and evaluate response  - Consider cultural and social influences on pain and pain management  - Manage/alleviate anxiety  - Utilize distraction and/or relaxation techniques  - Monitor for opioid side effects  - Notify MD/LIP if interventions unsuccessful or patient reports new pain  - Anticipate increased pain with activity and pre-medicate as appropriate  Outcome: Progressing     Problem: SAFETY ADULT - FALL  Goal: Free from fall injury  Description: INTERVENTIONS:  - Assess pt frequently for physical  needs  - Identify cognitive and physical deficits and behaviors that affect risk of falls.  - [place] fall precautions as indicated by assessment.  - Educate pt/family on patient safety including physical limitations  - Instruct pt to call for assistance with activity based on assessment  - Modify environment to reduce risk of injury  - Provide assistive devices as appropriate  - Consider OT/PT consult to assist with strengthening/mobility  - Encourage toileting schedule  Outcome: Progressing     Problem: Patient Centered Care  Goal: Patient preferences are identified and integrated in the patient's plan of care  Description: Interventions:  - What would you like us to know as we care for you?  - Provide timely, complete, and accurate information to patient/family  - Incorporate patient and family knowledge, values, beliefs, and cultural backgrounds into the planning and delivery of care  - Encourage patient/family to participate in care and decision-making at the level they choose  - Honor patient and family perspectives and choices  Outcome: Progressing

## 2024-08-05 NOTE — PROGRESS NOTES
[facility] Cardiology Progress Note    Leonard Shay Patient Status:  Observation    3/20/1935 MRN [id number]   Location [facility] 3W/SW Attending Valerie Clark MD   Hosp Day # 0 PCP Ayedn Samano MD     Subjective:  Patient seen and examined  Resting comfortably in bed  Telemetry reviewed, occasional low-grade bradycardia while resting    Objective:  /83 (BP Location: Right arm)   Pulse 83   Temp 98.4 °F (36.9 °C) (Oral)   Resp 18   Ht 5' 9\" (1.753 m)   Wt 155 lb (70.3 kg)   SpO2 99%   BMI 22.89 kg/m²     Telemetry: nsr. LBBB, int AV block as below       Intake/Output:    Intake/Output Summary (Last 24 hours) at 2024 0922  Last data filed at 2024 0729  Gross per 24 hour   Intake 175 ml   Output 1200 ml   Net -1025 ml       Last 3 Weights   24 0600 155 lb (70.3 kg)   24 0549 157 lb 4.8 oz (71.4 kg)   24 0514 155 lb (70.3 kg)   24 1625 143 lb (64.9 kg)   24 1319 158 lb (71.7 kg)       Labs:  Recent Labs   Lab 24  0724  0549 24  0549   *  --  117* 103*   BUN 17  --  16 15   CREATSERUM 0.98  --  0.94 0.90   EGFRCR 74  --  78 82   CA 9.6  --  9.8 10.3     --  143 142   K 3.6  3.6 4.4 4.3 4.0     --  109 107   CO2 29.0  --  31.0 29.0     Recent Labs   Lab 24  0601 24  0726 24  0549   RBC 4.01 3.71* 3.62*   HGB 10.7* 10.0* 9.7*   HCT 33.3* 31.4* 30.2*   MCV 83.0 84.6 83.4   MCH 26.7 27.0 26.8   MCHC 32.1 31.8 32.1   RDW 16.2* 15.9* 15.9*   NEPRELIM 3.20 3.56  --    WBC 4.8 5.4 3.3*   .0 186.0 205.0         Recent Labs   Lab 24  0601 24  0735 24  1500   TROPHS 72* 74* 75*       Diagnostics:             Physical Exam:    [facility] Echo 2024  ONCLUSIONS     SUMMARY:     1. Left ventricle: The cavity size is normal. Wall thickness is mildly      increased. Systolic function is at the lower limits of normal. The      estimated ejection fraction  is 50-55%. Due to first degree      atrioventricular block, there is fusion of early and atrial contributions      to ventricular filling. Diastolic function cannot be assessed on current      examination. There is no evidence of a thrombus.   2. Regional wall motion abnormality: Dyskinesis of the basal-mid      anteroseptal, basal-mid inferoseptal, and apical septal myocardium.   3. Ventricular septum: Septal motion is dyssynergic. These changes are      consistent with a left bundle branch block.   4. Aortic valve: There is mild regurgitation.   IMPRESSION:  No previous study was available for comparison.     ----------------------------------------------------------------------------   Physical Exam  Cardiovascular:      Rate and Rhythm: Regular rhythm. Tachycardia present.   Pulmonary:      Effort: Pulmonary effort is normal.   Abdominal:      Tenderness: There is abdominal tenderness.      Comments: Gaurding all 4 quadrants with light palpation    Musculoskeletal:      Right lower leg: No edema.      Left lower leg: No edema.   Skin:     General: Skin is warm and dry.   Neurological:      Mental Status: He is alert.         Medications:   heparin  5,000 Units Subcutaneous Q8H VALENTINO    docusate sodium  100 mg Oral BID    sennosides  8.6 mg Oral BID    polyethylene glycol (PEG 3350)  17 g Oral BID    amLODIPine  5 mg Oral Daily    aspirin  81 mg Oral Daily    donepezil  5 mg Oral Nightly    ferrous sulfate  325 mg Oral Daily with breakfast    pravastatin  20 mg Oral Nightly    pantoprazole  40 mg Oral Before breakfast    tamsulosin  0.4 mg Oral Daily    lisinopril (Prinivil; Zestril) 20 mg, hydroCHLOROthiazide 12.5 mg for Zestoretic 20-12.5 ([facility] only)   Oral Daily           Assessment:        Assessment     Chest pain- s/p blaise stress with no reversible ischemia, EF 60%; Echo from June as above, now with abdominal pain   Troponin Elevation- non MI, flat @ ~ 70  Chronic LBBB  Transient AV block- ? Associated  hyoptension, not on AV alma blockign agents.    HTN-hypotensive this am   DM2  Dementia     Plan:    Telemetry shows fairly stable rhythm  Troponin elevation in setting of demand ischemia, nuclear stress test low risk-normal EF with no ischemia  Vitals within normal range continue aspirin, statin  Monitor blood pressure on lisinopril, Norvasc  No further cardiac testing needed at this time    Thank you for allowing me to take part in the care of Leonard Shay. Please call with any questions of concerns.    L3    Tosha Cabrera,   [facility] [place]   Interventional Cardiac and Vascular Services

## 2024-08-05 NOTE — DISCHARGE SUMMARY
[facility]  part of [facility]    Discharge Summary    Leonard Shay Patient Status:  Observation    3/20/1935 MRN [id number]   Location [facility] 3W/SW Attending Valerie Clark MD   Hosp Day # 0 PCP Ayden Samano MD     Date of Admission: 2024   Date of Discharge: 2024    Hospital Discharge Diagnoses: Acute Chest pain     Lace+ Score: 70  59-90 High Risk  29-58 Medium Risk  0-28   Low Risk.    TCM Follow-Up Recommendation:  LACE > 58: High Risk of readmission after discharge from the hospital.        Admitting Diagnosis: Chest pain with moderate risk for cardiac etiology [R07.9]    Disposition: Home    Discharge Diagnosis: .Principal Problem:    Chest pain with moderate risk for cardiac etiology  Active Problems:    Chest pain      Hospital Course:   Reason for Admission:   Leonard Shay is a(n) 89 year old male who has a pmh of chronic dementia, chronic low back pain, DM2, prostate cancer, history of MI who was admitted for acute chest pain. Patient seems very lethargic. Most of the history was provided by the son.   Patient currently stays with the son. Per son, patient was complaining of chest pain this morning that lasted around 10 minutes, described as sharp, nonradiating. Denies LOC, falls, dyspnea on exertion.   He will be admitted for observation.     Discharge Physical Exam:   Physical Exam:    General: No acute distress.   Respiratory: Clear to auscultation bilaterally. No wheezes. No rhonchi.  Cardiovascular: S1, S2. Regular rate and rhythm. No murmurs, rubs or gallops.   Abdomen: Soft, nontender, nondistended.  Positive bowel sounds. No rebound or guarding.  Neurologic: No focal neurological deficits.   Musculoskeletal: Moves all extremities.    Hospital Course:   Chest pain with moderate risk for cardiac etiology    Elevated Troponin  - Cards on board  - lexiscan stress test WNL  -no further cardiac workup  - discharge home today      Abdominal pain  -  CT scan shows moderate stool burden  - stool softtners      Sinus Bradycarida  - monitor on tele overnight- NSR  - Discharge home today    Constipation  - resolved  - had a bowel movement today      DM2  - SSI low dose  - accuechecks      Dyslipidemia  - statin      HTN  - resume home blood pressure meds      DVT proph- heparin     Full code              Quality:  DVT Prophylaxis: Heparin      Complications: none    Consultants         Provider   Role Specialty     Dewey Herron MD      Consulting Physician Cardiovascular Diseases     Britt Buckner MD      Consulting Physician HOSPITALIST                Discharge Plan:   Discharge Condition: Stable    Current Discharge Medication List        Home Meds - Unchanged    Details   Mirabegron ER (MYRBETRIQ) 50 MG Oral Tablet 24 Hr Take 1 tablet (50 mg total) by mouth daily.      amLODIPine 5 MG Oral Tab Take 1 tablet (5 mg total) by mouth daily.      metFORMIN  MG Oral Tablet 24 Hr Take 1 tablet (500 mg total) by mouth daily.      pantoprazole 40 MG Oral Tab EC Take 1 tablet (40 mg total) by mouth before breakfast.      donepezil (ARICEPT) 5 MG Oral Tab Take 1 tablet (5 mg total) by mouth nightly.      lisinopril-hydroCHLOROthiazide 20-12.5 MG Oral Tab Take 2 tablets by mouth daily.      tamsulosin 0.4 MG Oral Cap Take 1 capsule (0.4 mg total) by mouth daily.      Lovastatin 20 MG Oral Tab Take 1 tablet (20 mg total) by mouth nightly.      Ferrous Sulfate 325 (65 Fe) MG Oral Tab Take 1 tablet (325 mg total) by mouth daily with breakfast.      aspirin 81 MG Oral Tab EC Take 1 tablet (81 mg total) by mouth daily.                 Discharge Diet: As tolerated and General diet    Discharge Activity: As tolerated       Discharge Medications        CONTINUE taking these medications        Instructions Prescription details   amLODIPine 5 MG Tabs  Commonly known as: Norvasc      Take 1 tablet (5 mg total) by mouth daily.   Quantity: 90 tablet  Refills: 3      aspirin 81 MG Tbec      Take 1 tablet (81 mg total) by mouth daily.   Refills: 0     donepezil 5 MG Tabs  Commonly known as: Aricept      Take 1 tablet (5 mg total) by mouth nightly.   Quantity: 90 tablet  Refills: 1     Ferrous Sulfate 325 (65 Fe) MG Tabs      Take 1 tablet (325 mg total) by mouth daily with breakfast.   Quantity: 90 tablet  Refills: 1     lisinopril-hydroCHLOROthiazide 20-12.5 MG Tabs  Commonly known as: Zestoretic      Take 2 tablets by mouth daily.   Quantity: 180 tablet  Refills: 3     Lovastatin 20 MG Tabs      Take 1 tablet (20 mg total) by mouth nightly.   Quantity: 90 tablet  Refills: 3     metFORMIN  MG Tb24  Commonly known as: Glucophage XR      Take 1 tablet (500 mg total) by mouth daily.   Quantity: 90 tablet  Refills: 0     Mirabegron ER 50 MG Tb24  Commonly known as: Myrbetriq      Take 1 tablet (50 mg total) by mouth daily.   Quantity: 30 tablet  Refills: 0     pantoprazole 40 MG Tbec  Commonly known as: Protonix      Take 1 tablet (40 mg total) by mouth before breakfast.   Quantity: 90 tablet  Refills: 0     tamsulosin 0.4 MG Caps  Commonly known as: Flomax      Take 1 capsule (0.4 mg total) by mouth daily.   Quantity: 90 capsule  Refills: 1              Follow up:      Follow-up Information       Kennedy Bond MD Follow up in 3 week(s).    Specialty: CARDIOLOGY  Why: Office will call to schedule your follow up visit  Contact information:  [address] [facility] [address]  [facility] [address]  [phone]               Ayden Samano MD Follow up in 1 week(s).    Specialty: Internal Medicine  Contact information:  [address] [facility] [address]  [facility] [address]  [phone]                             Follow up Labs and imaging:         Time spent:  > 30 minutes    PONCE ALBARADO MD  8/5/2024

## 2024-08-05 NOTE — PHYSICAL THERAPY NOTE
PHYSICAL THERAPY EVALUATION - INPATIENT     Room Number: COF12/COF12-A  Evaluation Date: 8/5/2024  Type of Evaluation: Initial   Physician Order: PT Eval and Treat    Presenting Problem: chest pain, abdominal pain  Co-Morbidities : dementia, LBP, DM II, prostate CA, hx of MI  Reason for Therapy: Mobility Dysfunction and Discharge Planning    PHYSICAL THERAPY ASSESSMENT   Patient is a 89 year old male admitted 8/2/2024 for chest pain. RN cleared pt to participate in PT evaluation this morning, coordinated session with OT. Pt received in bed at start, agreeable to participate.     Prior to admission, patient's baseline is unknown, living with son x 1 month.  Patient is currently functioning below baseline with bed mobility, transfers, and gait.  Patient is requiring contact guard assist as a result of the following impairments: decreased functional strength, decreased endurance/aerobic capacity, and impaired motor planning.  Physical Therapy will continue to follow for duration of hospitalization.    Patient will benefit from continued skilled PT Services at discharge to promote prior level of function and safety with additional support and return home with home health PT.    PLAN  PT Treatment Plan: Bed mobility;Endurance;Energy conservation;Patient education;Gait training;Neuromuscular re-educate;Range of motion;Strengthening;Stair training;Transfer training;Balance training  Rehab Potential : Good  Frequency (Obs): 5x/week    PHYSICAL THERAPY MEDICAL/SOCIAL HISTORY   History related to current admission:      Problem List  Principal Problem:    Chest pain with moderate risk for cardiac etiology  Active Problems:    Chest pain      HOME SITUATION  Home Situation  Type of Home: House  Lives With: Son (per chart, pt has been staying with son since rehab stay about 1 month ago, pt poor historian and unable to recall home set up)  Patient Owned Equipment: Rolling walker     Prior Level of [place]: unknown, pt has  been living with son since rehab stay 1 month ago, per chart pt was working at a [employer] when he had syncopal episode and was then transitioned to  facility, indep and living alone prior to this incident    SUBJECTIVE  \"I don't understand what we are doing here.\"     PHYSICAL THERAPY EXAMINATION   OBJECTIVE  Precautions: Bed/chair alarm  Fall Risk: Standard fall risk    WEIGHT BEARING RESTRICTION  Weight Bearing Restriction: None                PAIN ASSESSMENT  Rating: Other (Comment) (denies acute pain)          COGNITION  Overall Cognitive Status:  Impaired    RANGE OF MOTION AND STRENGTH ASSESSMENT  Upper extremity ROM and strength are within functional limits   Lower extremity ROM is within functional limits   Lower extremity strength is within functional limits     BALANCE  Static Sitting: Good  Dynamic Sitting: Fair +  Static Standing: Fair  Dynamic Standing: Fair -      ACTIVITY TOLERANCE  Pulse: 93  Heart Rate Source: Monitor                   O2 WALK       AM-PAC '6-Clicks' INPATIENT SHORT FORM - BASIC MOBILITY  How much difficulty does the patient currently have...  Patient Difficulty: Turning over in bed (including adjusting bedclothes, sheets and blankets)?: A Little   Patient Difficulty: Sitting down on and standing up from a chair with arms (e.g., wheelchair, bedside commode, etc.): A Little   Patient Difficulty: Moving from lying on back to sitting on the side of the bed?: A Little   How much help from another person does the patient currently need...   Help from Another: Moving to and from a bed to a chair (including a wheelchair)?: A Little   Help from Another: Need to walk in hospital room?: A Little   Help from Another: Climbing 3-5 steps with a railing?: A Little     AM-PAC Score:  Raw Score: 18   Approx Degree of Impairment: 46.58%   Standardized Score (AM-PAC Scale): 43.63   CMS Modifier (G-Code): CK    FUNCTIONAL ABILITY STATUS  Functional Mobility/Gait Assessment  Gait Assistance:  Contact guard assist  Distance (ft): 60 ft  Assistive Device: Rolling walker  Pattern:  (decreased lucio, shuffle)  Rolling: contact guard assist  Supine to Sit: contact guard assist  Sit to Stand: contact guard assist    Exercise/Education Provided:  Bed mobility  Body mechanics  Energy conservation  Functional activity tolerated  Gait training  Posture  ROM  Strengthening  Transfer training    Skilled Therapy Provided: Pt received in bed at start of session, agreeable to participate. Pt with moderate confusion during session, oriented to self. Pt demonstrated supine to sit EOB with supervision. Good static sit balance. Gait belt donned. Ambulated 60 ft with RW in hallway, CGA. Slow lucio and decreased heel strike bilaterally. Pt tolerated standing at sink x 2 min and then performed bathroom tasks with CGA. Pt stating several times \"I feel better but I'm not getting around like usual.\" Pt stayed up in bedside chair at end of session, Vitals stable throughout session. Chair alarm set and needs left in reach. RN aware of session outcome. Based on pts current functional level, recommend he return home with son and continued support as needed. Pt would not be safe to be home alone due to memory impairments. Will need further clarification regarding son's home set up.     The patient's Approx Degree of Impairment: 46.58% has been calculated based on documentation in the [facility] '6 clicks' Inpatient Basic Mobility Short Form.  Research supports that patients with this level of impairment may benefit from HHPT and support from family.    Final disposition will be made by interdisciplinary medical team.    Patient End of Session: Up in chair;Needs met;Call light within reach;RN aware of session/findings;All patient questions and concerns addressed;Alarm set    CURRENT GOALS  Goals to be met by: 8/20  Patient Goal Patient's self-stated goal is: Pt does not state.    Goal #1 Patient is able to demonstrate supine - sit EOB  @ level: independent     Goal #1   Current Status    Goal #2 Patient is able to demonstrate transfers EOB to/from Chair/Wheelchair at assistance level: modified independent with walker - rolling     Goal #2  Current Status    Goal #3 Patient is able to ambulate 200 feet with assist device: walker - rolling at assistance level: modified independent   Goal #3   Current Status    Goal #4 Patient will negotiate 5 stairs/one curb w/ assistive device and supervision   Goal #4   Current Status    Goal #5 Patient to demonstrate independence with home activity/exercise instructions provided to patient in preparation for discharge.   Goal #5   Current Status    Goal #6    Goal #6  Current Status      Patient Evaluation Complexity Level:  History Moderate - 1 or 2 personal factors and/or co-morbidities   Examination of body systems Moderate - addressing a total of 3 or more elements   Clinical Presentation  Moderate - Evolving   Clinical Decision Making  Moderate Complexity     Gait Training: 10 minutes  Therapeutic Activity:  13 minutes

## 2024-08-06 NOTE — PROGRESS NOTES
Pt dc'd off floor via wheelchair with sonSudheer. AVS given to son, education provided. Belongings returned. Iv and tele removed.

## 2024-08-07 ENCOUNTER — PATIENT OUTREACH (OUTPATIENT)
Dept: CASE MANAGEMENT | Age: 89
End: 2024-08-07

## 2024-08-07 DIAGNOSIS — R07.9 ACUTE CHEST PAIN: ICD-10-CM

## 2024-08-07 DIAGNOSIS — R07.9 CHEST PAIN WITH MODERATE RISK FOR CARDIAC ETIOLOGY: ICD-10-CM

## 2024-08-07 DIAGNOSIS — Z02.9 ENCOUNTERS FOR UNSPECIFIED ADMINISTRATIVE PURPOSE: Primary | ICD-10-CM

## 2024-08-07 NOTE — PROGRESS NOTES
Initial Post Discharge Follow Up   Discharge Date: 8/5/24  Contact Date: 8/7/2024    Consent Verification:  Assessment Completed With: Other: Sudheer, patient's son Permission received per patient?  written  HIPAA Verified?  Yes    Discharge Dx:   Chest pain with moderate risk for cardiac etiology   Acute chest pain  - lexiscan stress test WNL   PMH: chronic dementia, chronic low back pain, DM2, prostate cancer, history of MI     General:   How have you been since your discharge from the hospital? Sudheer states his father Leonard is doing well. No fever/chills, no shortness of breath, no complaints of chest pain, Sudheer states his father did state he has some abdominal pain, no vomiting. [facility] asked if patient was constipated, Sudheer was not sure. [facility] did inform Sudheer that his father was constipated when he was in the hospital and he was receiving stool softeners and was finally able to have a BM on 8/5/24 day of discharge. If he is constipated he may need to get an order for a stool softener. [facility] reviewed discharge instructions, with Sudheer and instructed him to call 911 if his father has any further chest pain, shortness of breath, coughing up blood and severe abdominal pain, nausea/vomiting, or any other concerning symptoms,Sudheer verbalized understanding of these. Sudheer denies any further questions or needs at this time.  Do you have any pain since discharge?  No  When you were leaving the hospital were your discharge instructions reviewed with you? Yes  Do you have any questions about your discharge instructions?  No  Before leaving the hospital was your diagnoses explained to you? Yes  Do you have any questions about your diagnoses? No  Are you able to perform normal daily activities of living as you have prior to your hospital stay (dressing, bathing, ambulating to the bathroom, etc)? Did not get address.  ([facility]) Was patient given a different diet per AVS? no      Medications: No changes were made to your  medications.  Current Outpatient Medications   Medication Sig Dispense Refill    Mirabegron ER (MYRBETRIQ) 50 MG Oral Tablet 24 Hr Take 1 tablet (50 mg total) by mouth daily. 30 tablet 0    amLODIPine 5 MG Oral Tab Take 1 tablet (5 mg total) by mouth daily. 90 tablet 3    metFORMIN  MG Oral Tablet 24 Hr Take 1 tablet (500 mg total) by mouth daily. 90 tablet 0    pantoprazole 40 MG Oral Tab EC Take 1 tablet (40 mg total) by mouth before breakfast. 90 tablet 0    donepezil (ARICEPT) 5 MG Oral Tab Take 1 tablet (5 mg total) by mouth nightly. 90 tablet 1    lisinopril-hydroCHLOROthiazide 20-12.5 MG Oral Tab Take 2 tablets by mouth daily. 180 tablet 3    tamsulosin 0.4 MG Oral Cap Take 1 capsule (0.4 mg total) by mouth daily. 90 capsule 1    Lovastatin 20 MG Oral Tab Take 1 tablet (20 mg total) by mouth nightly. 90 tablet 3    Ferrous Sulfate 325 (65 Fe) MG Oral Tab Take 1 tablet (325 mg total) by mouth daily with breakfast. 90 tablet 1    aspirin 81 MG Oral Tab EC Take 1 tablet (81 mg total) by mouth daily.       [facility] [place] unable to do a complete medication reconciliation at the time of the TCM call. Patient will need a medication reconciliation at TCM visit.       Referrals/orders at D/C:  Referrals/orders placed at D/C? no    DME ordered at D/C? No    Discharge orders, AVS reviewed and discussed with patient.   Patient Acknowledged      SDOH:   Transportation:    Transportation Needs: No Transportation Needs (8/3/2024)    Transportation Needs     Lack of Transportation: No     Car Seat: Not on file       Financial Strain:   Financial Resource Strain: Low Risk  (8/7/2024)    Financial Resource Strain     Difficulty of Paying Living Expenses: Not on file     Med Affordability: No       Diagnosis specifics:   Chest pain, uncertain cause  Call 911 if any of these occur:   A change in the type of pain: if it feels different, becomes more severe, lasts longer, or begins to spread into your shoulder, arm, neck, jaw or  back  Shortness of breath or increased pain with breathing  Weakness, dizziness, or fainting  Rapid heartbeat  Crushing sensation in your chest  Coughing up more than a small amount of blood.    Call your healthcare provider right away if any of the following occur:   Cough with dark colored sputum (phlegm) or small amount of blood  Fever of 100.4ºF (38ºC) or higher, or as directed by your healthcare provider  Swelling, pain or redness in one leg    Follow up appointments:      Your appointments       Date & Time Appointment Department (Center)    Sep 03, 2024 11:15 AM CDT MA Supervisit with Ayden Samano MD [facility] ([facility])        Sep 10, 2024 10:30 AM CDT Lab Visit with [facility] RESOURCE [facility] Hematology Oncology ([facility])        Sep 10, 2024 11:00 AM CDT HEMATOLOGY ONCOLOGY FOLLOW UP with Viktor Bloom MD [facility] Hematology Oncology ([facility])              [facility] Hematology Oncology  [facility]  [address] [address]  [phone] [facility]  [address]  [facility] [address]  [phone]            TCC  Was TCC ordered: No      PCP (If no TCC appointment)  Does patient already have a PCP appointment scheduled? No  [facility] Scheduled PCP office TCM appointment with patient, 8/13/2024 at 12:00 pm.       Specialist    Does the patient have any other follow up appointment(s) needing to be scheduled? No  Does the patient need assistance scheduling appointment(s): No  Patient to follow up with Dr Bond, cardiology in 3 weeks, Office is to call and schedule.    Is there any reason as to why you cannot make your appointment(s)?No     Needs post D/C:   Now that you are home, are there any needs or concerns you need addressed before your next visit with your PCP?  (DME, meds, questions, etc.):  No    Interventions by NCM:   NCM reviewed discharge instructions, S&S of infection/blood clots. Sudheer instructed to report any new or worsening symptoms, when to call the doctor and when to call 911.  Sudheer  verbalized understanding of these. [facility] instructed Sudheer to call his father's PCP with any questions or needs, he states he will.      [facility] referral placed: No, not at this time    BOOK BY DATE: 8/19/2024

## 2024-08-13 ENCOUNTER — OFFICE VISIT (OUTPATIENT)
Facility: CLINIC | Age: 89
End: 2024-08-13

## 2024-08-13 VITALS
OXYGEN SATURATION: 95 % | SYSTOLIC BLOOD PRESSURE: 112 MMHG | WEIGHT: 151 LBS | HEART RATE: 78 BPM | HEIGHT: 69 IN | DIASTOLIC BLOOD PRESSURE: 64 MMHG | BODY MASS INDEX: 22.36 KG/M2

## 2024-08-13 DIAGNOSIS — R07.89 ATYPICAL CHEST PAIN: Primary | ICD-10-CM

## 2024-08-13 DIAGNOSIS — K59.00 CONSTIPATION, UNSPECIFIED CONSTIPATION TYPE: ICD-10-CM

## 2024-08-13 PROBLEM — R07.9 CHEST PAIN WITH MODERATE RISK FOR CARDIAC ETIOLOGY: Status: RESOLVED | Noted: 2024-08-02 | Resolved: 2024-08-13

## 2024-08-13 PROBLEM — R07.9 CHEST PAIN: Status: RESOLVED | Noted: 2024-08-02 | Resolved: 2024-08-13

## 2024-08-13 PROCEDURE — 1125F AMNT PAIN NOTED PAIN PRSNT: CPT | Performed by: INTERNAL MEDICINE

## 2024-08-13 PROCEDURE — 1111F DSCHRG MED/CURRENT MED MERGE: CPT | Performed by: INTERNAL MEDICINE

## 2024-08-13 PROCEDURE — 3008F BODY MASS INDEX DOCD: CPT | Performed by: INTERNAL MEDICINE

## 2024-08-13 PROCEDURE — 99495 TRANSJ CARE MGMT MOD F2F 14D: CPT | Performed by: INTERNAL MEDICINE

## 2024-08-13 PROCEDURE — 3074F SYST BP LT 130 MM HG: CPT | Performed by: INTERNAL MEDICINE

## 2024-08-13 PROCEDURE — 1159F MED LIST DOCD IN RCRD: CPT | Performed by: INTERNAL MEDICINE

## 2024-08-13 PROCEDURE — 3078F DIAST BP <80 MM HG: CPT | Performed by: INTERNAL MEDICINE

## 2024-08-13 NOTE — PROGRESS NOTES
Subjective:   Leonard Salcido is a 89 year old male who presents for hospital follow up.   He was discharged from [facility] to Home or Self Care  Admission Date: 8/2/24   Discharge Date: 8/5/24  Hospital Discharge Diagnosis: Atypical chest pain, constipation    Interactive contact within 2 business days post discharge first initiated on Date: 8/7/2024    During the visit, the following was completed:  Obtained and reviewed discharge summary, continuity of care documents, and Hospitalist notes  Reviewed Labs (CBC, CMP)    HPI: Discharge summary copied as below    Leonard Shay is a(n) 89 year old male who has a pmh of chronic dementia, chronic low back pain, DM2, prostate cancer, history of MI who was admitted for acute chest pain. Patient seems very lethargic. Most of the history was provided by the son.   Patient currently stays with the son. Per son, patient was complaining of chest pain this morning that lasted around 10 minutes, described as sharp, nonradiating. Denies LOC, falls, dyspnea on exertion.   He will be admitted for observation.      Discharge Physical Exam:   Physical Exam:    General: No acute distress.   Respiratory: Clear to auscultation bilaterally. No wheezes. No rhonchi.  Cardiovascular: S1, S2. Regular rate and rhythm. No murmurs, rubs or gallops.   Abdomen: Soft, nontender, nondistended.  Positive bowel sounds. No rebound or guarding.  Neurologic: No focal neurological deficits.   Musculoskeletal: Moves all extremities.     Hospital Course:   Chest pain with moderate risk for cardiac etiology    Elevated Troponin  - Cards on board  - lexiscan stress test WNL  -no further cardiac workup  - discharge home today      Abdominal pain  - CT scan shows moderate stool burden  - stool softtners      Sinus Bradycarida  - monitor on tele overnight- NSR  - Discharge home today     Constipation  - resolved  - had a bowel movement today      DM2  - SSI low dose  - accuechecks      Dyslipidemia  -  statin      HTN  - resume home blood pressure meds      DVT proph- heparin    Patient here for follow-up with his son.  No more recurrence of chest pain.  Son showed me the videos saying that he exercises at home when he is with him.  I encouraged him to have some close supervision to make sure that he is eating and be physically active.  He has been taking his medication regularly.    History/Other:   Current Medications:  Medication Reconciliation:  I am aware of an inpatient discharge within the last 30 days.  The discharge medication list has been reconciled with the patient's current medication list and reviewed by me.  See medication list for additions of new medication, and changes to current doses of medications and discontinued medications.  Outpatient Medications Marked as Taking for the 8/13/24 encounter (Office Visit) with Ayden Samano MD   Medication Sig    Mirabegron ER (MYRBETRIQ) 50 MG Oral Tablet 24 Hr Take 1 tablet (50 mg total) by mouth daily.    amLODIPine 5 MG Oral Tab Take 1 tablet (5 mg total) by mouth daily.    metFORMIN  MG Oral Tablet 24 Hr Take 1 tablet (500 mg total) by mouth daily.    pantoprazole 40 MG Oral Tab EC Take 1 tablet (40 mg total) by mouth before breakfast.    donepezil (ARICEPT) 5 MG Oral Tab Take 1 tablet (5 mg total) by mouth nightly.    lisinopril-hydroCHLOROthiazide 20-12.5 MG Oral Tab Take 2 tablets by mouth daily.    tamsulosin 0.4 MG Oral Cap Take 1 capsule (0.4 mg total) by mouth daily.    Lovastatin 20 MG Oral Tab Take 1 tablet (20 mg total) by mouth nightly.    Ferrous Sulfate 325 (65 Fe) MG Oral Tab Take 1 tablet (325 mg total) by mouth daily with breakfast.     Current Facility-Administered Medications for the 8/13/24 encounter (Office Visit) with Ayden Samano MD   Medication    leuprolide (ELIGARD) depot injection 45 mg       Review of Systems   Constitutional:  Positive for fatigue. Negative for activity change, appetite change and fever.   HENT:   Negative for congestion and voice change.    Respiratory:  Negative for cough and shortness of breath.    Cardiovascular:  Negative for chest pain.   Gastrointestinal:  Negative for abdominal distention, abdominal pain and vomiting.   Genitourinary:  Negative for hematuria.   Skin:  Negative for wound.   Psychiatric/Behavioral:  Negative for behavioral problems.          Objective:   Physical Exam  Constitutional:       Appearance: Normal appearance.   HENT:      Head: Normocephalic.   Eyes:      Conjunctiva/sclera: Conjunctivae normal.   Cardiovascular:      Rate and Rhythm: Normal rate and regular rhythm.      Heart sounds: Normal heart sounds. No murmur heard.  Pulmonary:      Effort: Pulmonary effort is normal.      Breath sounds: Normal breath sounds. No rhonchi or rales.   Abdominal:      General: Bowel sounds are normal.      Palpations: Abdomen is soft.      Tenderness: There is no abdominal tenderness.   Musculoskeletal:      Cervical back: Neck supple.      Right lower leg: No edema.      Left lower leg: No edema.   Skin:     General: Skin is warm and dry.   Neurological:      General: No focal deficit present.      Mental Status: He is alert and oriented to person, place, and time. Mental status is at baseline.   Psychiatric:         Mood and Affect: Mood normal.         Behavior: Behavior normal.         /64   Pulse 78   Ht 5' 9\" (1.753 m)   Wt 151 lb (68.5 kg)   SpO2 95%   BMI 22.30 kg/m²  Estimated body mass index is 22.3 kg/m² as calculated from the following:    Height as of this encounter: 5' 9\" (1.753 m).    Weight as of this encounter: 151 lb (68.5 kg).    Assessment & Plan:   1. Atypical chest pain (Primary)  2. Constipation, unspecified constipation type    Plan  Continue current regimen.  High-fiber diet and stool softeners.  If no better, then check TSH.  Continue to follow-up with Dr. Bloom I will see him back in 2 months.    No follow-ups on file.

## 2024-09-09 DIAGNOSIS — Z08 ENCOUNTER FOR FOLLOW-UP SURVEILLANCE OF PROSTATE CANCER: Primary | ICD-10-CM

## 2024-09-09 DIAGNOSIS — Z85.46 ENCOUNTER FOR FOLLOW-UP SURVEILLANCE OF PROSTATE CANCER: Primary | ICD-10-CM

## 2024-12-21 ENCOUNTER — HOSPITAL ENCOUNTER (OUTPATIENT)
Facility: HOSPITAL | Age: 89
Setting detail: OBSERVATION
Discharge: HOME HEALTH | End: 2024-12-23
Attending: STUDENT IN AN ORGANIZED HEALTH CARE EDUCATION/TRAINING PROGRAM | Admitting: INTERNAL MEDICINE
Payer: MEDICARE

## 2024-12-21 ENCOUNTER — APPOINTMENT (OUTPATIENT)
Dept: GENERAL RADIOLOGY | Facility: HOSPITAL | Age: 89
End: 2024-12-21
Payer: MEDICARE

## 2024-12-21 DIAGNOSIS — R55 SYNCOPE, UNSPECIFIED SYNCOPE TYPE: Primary | ICD-10-CM

## 2024-12-21 PROBLEM — E87.0 HYPERNATREMIA: Status: ACTIVE | Noted: 2024-12-21

## 2024-12-21 LAB
ALBUMIN SERPL-MCNC: 4.2 G/DL (ref 3.2–4.8)
ALBUMIN/GLOB SERPL: 1.2 {RATIO} (ref 1–2)
ALP LIVER SERPL-CCNC: 77 U/L
ALT SERPL-CCNC: 14 U/L
ANION GAP SERPL CALC-SCNC: 6 MMOL/L (ref 0–18)
AST SERPL-CCNC: 22 U/L (ref ?–34)
BASOPHILS # BLD AUTO: 0.03 X10(3) UL (ref 0–0.2)
BASOPHILS NFR BLD AUTO: 0.7 %
BILIRUB SERPL-MCNC: 0.2 MG/DL (ref 0.2–1.1)
BUN BLD-MCNC: 27 MG/DL (ref 9–23)
BUN/CREAT SERPL: 23.1 (ref 10–20)
CALCIUM BLD-MCNC: 10.2 MG/DL (ref 8.7–10.4)
CHLORIDE SERPL-SCNC: 108 MMOL/L (ref 98–112)
CHOLEST SERPL-MCNC: 180 MG/DL (ref ?–200)
CO2 SERPL-SCNC: 32 MMOL/L (ref 21–32)
CREAT BLD-MCNC: 1.17 MG/DL
DEPRECATED RDW RBC AUTO: 43.8 FL (ref 35.1–46.3)
EGFRCR SERPLBLD CKD-EPI 2021: 60 ML/MIN/1.73M2 (ref 60–?)
EOSINOPHIL # BLD AUTO: 0.11 X10(3) UL (ref 0–0.7)
EOSINOPHIL NFR BLD AUTO: 2.4 %
ERYTHROCYTE [DISTWIDTH] IN BLOOD BY AUTOMATED COUNT: 14.3 % (ref 11–15)
EST. AVERAGE GLUCOSE BLD GHB EST-MCNC: 157 MG/DL (ref 68–126)
GLOBULIN PLAS-MCNC: 3.4 G/DL (ref 2–3.5)
GLUCOSE BLD-MCNC: 143 MG/DL (ref 70–99)
GLUCOSE BLDC GLUCOMTR-MCNC: 122 MG/DL (ref 70–99)
GLUCOSE BLDC GLUCOMTR-MCNC: 129 MG/DL (ref 70–99)
HBA1C MFR BLD: 7.1 % (ref ?–5.7)
HCT VFR BLD AUTO: 32.5 %
HDLC SERPL-MCNC: 37 MG/DL (ref 40–59)
HGB BLD-MCNC: 10.7 G/DL
IMM GRANULOCYTES # BLD AUTO: 0.02 X10(3) UL (ref 0–1)
IMM GRANULOCYTES NFR BLD: 0.4 %
LDLC SERPL CALC-MCNC: 108 MG/DL (ref ?–100)
LYMPHOCYTES # BLD AUTO: 1.32 X10(3) UL (ref 1–4)
LYMPHOCYTES NFR BLD AUTO: 28.9 %
MCH RBC QN AUTO: 27.9 PG (ref 26–34)
MCHC RBC AUTO-ENTMCNC: 32.9 G/DL (ref 31–37)
MCV RBC AUTO: 84.6 FL
MONOCYTES # BLD AUTO: 0.52 X10(3) UL (ref 0.1–1)
MONOCYTES NFR BLD AUTO: 11.4 %
NEUTROPHILS # BLD AUTO: 2.56 X10 (3) UL (ref 1.5–7.7)
NEUTROPHILS # BLD AUTO: 2.56 X10(3) UL (ref 1.5–7.7)
NEUTROPHILS NFR BLD AUTO: 56.2 %
NONHDLC SERPL-MCNC: 143 MG/DL (ref ?–130)
OSMOLALITY SERPL CALC.SUM OF ELEC: 310 MOSM/KG (ref 275–295)
PLATELET # BLD AUTO: 229 10(3)UL (ref 150–450)
POTASSIUM SERPL-SCNC: 5.3 MMOL/L (ref 3.5–5.1)
PROT SERPL-MCNC: 7.6 G/DL (ref 5.7–8.2)
RBC # BLD AUTO: 3.84 X10(6)UL
SODIUM SERPL-SCNC: 146 MMOL/L (ref 136–145)
TRIGL SERPL-MCNC: 198 MG/DL (ref 30–149)
TROPONIN I SERPL HS-MCNC: 55 NG/L
VLDLC SERPL CALC-MCNC: 34 MG/DL (ref 0–30)
WBC # BLD AUTO: 4.6 X10(3) UL (ref 4–11)

## 2024-12-21 PROCEDURE — 99223 1ST HOSP IP/OBS HIGH 75: CPT | Performed by: INTERNAL MEDICINE

## 2024-12-21 PROCEDURE — 71045 X-RAY EXAM CHEST 1 VIEW: CPT

## 2024-12-21 RX ORDER — DEXTROSE MONOHYDRATE 25 G/50ML
50 INJECTION, SOLUTION INTRAVENOUS
Status: DISCONTINUED | OUTPATIENT
Start: 2024-12-21 | End: 2024-12-23

## 2024-12-21 RX ORDER — ACETAMINOPHEN 500 MG
500 TABLET ORAL EVERY 4 HOURS PRN
Status: DISCONTINUED | OUTPATIENT
Start: 2024-12-21 | End: 2024-12-23

## 2024-12-21 RX ORDER — TAMSULOSIN HYDROCHLORIDE 0.4 MG/1
0.4 CAPSULE ORAL DAILY
Status: DISCONTINUED | OUTPATIENT
Start: 2024-12-21 | End: 2024-12-23

## 2024-12-21 RX ORDER — HEPARIN SODIUM 5000 [USP'U]/ML
5000 INJECTION, SOLUTION INTRAVENOUS; SUBCUTANEOUS EVERY 8 HOURS SCHEDULED
Status: DISCONTINUED | OUTPATIENT
Start: 2024-12-21 | End: 2024-12-23

## 2024-12-21 RX ORDER — PANTOPRAZOLE SODIUM 40 MG/1
40 TABLET, DELAYED RELEASE ORAL
Status: DISCONTINUED | OUTPATIENT
Start: 2024-12-21 | End: 2024-12-23

## 2024-12-21 RX ORDER — LISINOPRIL 40 MG/1
40 TABLET ORAL DAILY
Status: DISCONTINUED | OUTPATIENT
Start: 2024-12-22 | End: 2024-12-23

## 2024-12-21 RX ORDER — MIRABEGRON 50 MG/1
50 TABLET, FILM COATED, EXTENDED RELEASE ORAL DAILY
Status: DISCONTINUED | OUTPATIENT
Start: 2024-12-22 | End: 2024-12-23 | Stop reason: RX

## 2024-12-21 RX ORDER — HYDRALAZINE HYDROCHLORIDE 20 MG/ML
10 INJECTION INTRAMUSCULAR; INTRAVENOUS EVERY 6 HOURS PRN
Status: DISCONTINUED | OUTPATIENT
Start: 2024-12-21 | End: 2024-12-23

## 2024-12-21 RX ORDER — AMLODIPINE BESYLATE 5 MG/1
5 TABLET ORAL DAILY
Status: DISCONTINUED | OUTPATIENT
Start: 2024-12-22 | End: 2024-12-23

## 2024-12-21 RX ORDER — NICOTINE POLACRILEX 4 MG
30 LOZENGE BUCCAL
Status: DISCONTINUED | OUTPATIENT
Start: 2024-12-21 | End: 2024-12-23

## 2024-12-21 RX ORDER — NICOTINE POLACRILEX 4 MG
15 LOZENGE BUCCAL
Status: DISCONTINUED | OUTPATIENT
Start: 2024-12-21 | End: 2024-12-23

## 2024-12-21 RX ORDER — LISINOPRIL AND HYDROCHLOROTHIAZIDE 12.5; 2 MG/1; MG/1
2 TABLET ORAL DAILY
Status: DISCONTINUED | OUTPATIENT
Start: 2024-12-21 | End: 2024-12-21

## 2024-12-21 RX ORDER — DONEPEZIL HYDROCHLORIDE 5 MG/1
5 TABLET, FILM COATED ORAL NIGHTLY
Status: DISCONTINUED | OUTPATIENT
Start: 2024-12-21 | End: 2024-12-23

## 2024-12-21 RX ORDER — FERROUS SULFATE 325(65) MG
325 TABLET, DELAYED RELEASE (ENTERIC COATED) ORAL
Status: DISCONTINUED | OUTPATIENT
Start: 2024-12-22 | End: 2024-12-23

## 2024-12-21 RX ORDER — HYDROCHLOROTHIAZIDE 25 MG/1
25 TABLET ORAL DAILY
Status: DISCONTINUED | OUTPATIENT
Start: 2024-12-22 | End: 2024-12-23

## 2024-12-21 RX ORDER — SODIUM CHLORIDE 450 MG/100ML
INJECTION, SOLUTION INTRAVENOUS CONTINUOUS
Status: ACTIVE | OUTPATIENT
Start: 2024-12-21 | End: 2024-12-22

## 2024-12-21 RX ORDER — PRAVASTATIN SODIUM 20 MG
20 TABLET ORAL NIGHTLY
Status: DISCONTINUED | OUTPATIENT
Start: 2024-12-21 | End: 2024-12-23

## 2024-12-22 ENCOUNTER — APPOINTMENT (OUTPATIENT)
Dept: CV DIAGNOSTICS | Facility: HOSPITAL | Age: 89
End: 2024-12-22
Attending: NURSE PRACTITIONER
Payer: MEDICARE

## 2024-12-22 ENCOUNTER — APPOINTMENT (OUTPATIENT)
Dept: CT IMAGING | Facility: HOSPITAL | Age: 89
End: 2024-12-22
Attending: INTERNAL MEDICINE
Payer: MEDICARE

## 2024-12-22 LAB
ANION GAP SERPL CALC-SCNC: 6 MMOL/L (ref 0–18)
ATRIAL RATE: 69 BPM
BUN BLD-MCNC: 24 MG/DL (ref 9–23)
BUN/CREAT SERPL: 24 (ref 10–20)
CALCIUM BLD-MCNC: 9.8 MG/DL (ref 8.7–10.4)
CHLORIDE SERPL-SCNC: 110 MMOL/L (ref 98–112)
CO2 SERPL-SCNC: 29 MMOL/L (ref 21–32)
CREAT BLD-MCNC: 1 MG/DL
EGFRCR SERPLBLD CKD-EPI 2021: 72 ML/MIN/1.73M2 (ref 60–?)
GLUCOSE BLD-MCNC: 134 MG/DL (ref 70–99)
GLUCOSE BLDC GLUCOMTR-MCNC: 120 MG/DL (ref 70–99)
GLUCOSE BLDC GLUCOMTR-MCNC: 149 MG/DL (ref 70–99)
GLUCOSE BLDC GLUCOMTR-MCNC: 179 MG/DL (ref 70–99)
GLUCOSE BLDC GLUCOMTR-MCNC: 189 MG/DL (ref 70–99)
OSMOLALITY SERPL CALC.SUM OF ELEC: 306 MOSM/KG (ref 275–295)
P AXIS: 55 DEGREES
P-R INTERVAL: 370 MS
POTASSIUM SERPL-SCNC: 4.3 MMOL/L (ref 3.5–5.1)
Q-T INTERVAL: 468 MS
QRS DURATION: 138 MS
QTC CALCULATION (BEZET): 501 MS
R AXIS: -50 DEGREES
SODIUM SERPL-SCNC: 145 MMOL/L (ref 136–145)
T AXIS: 117 DEGREES
VENTRICULAR RATE: 69 BPM

## 2024-12-22 PROCEDURE — 99233 SBSQ HOSP IP/OBS HIGH 50: CPT | Performed by: HOSPITALIST

## 2024-12-22 PROCEDURE — 93306 TTE W/DOPPLER COMPLETE: CPT | Performed by: NURSE PRACTITIONER

## 2024-12-22 PROCEDURE — B246ZZZ ULTRASONOGRAPHY OF RIGHT AND LEFT HEART: ICD-10-PCS | Performed by: INTERNAL MEDICINE

## 2024-12-22 PROCEDURE — 70450 CT HEAD/BRAIN W/O DYE: CPT | Performed by: INTERNAL MEDICINE

## 2024-12-22 NOTE — H&P
[facility]  part of [facility]                                                                                                          History and Physical     Leonard Salcido Patient Status:  Emergency    3/20/1935 MRN [id number]   Location [facility] EMERGENCY DEPARTMENT Attending Junaid Gonzalez*   Hosp Day # 0 PCP Ayden Samano MD     History obtained from son at bedside    Chief Complaint: Witnessed syncope    Subjective:    Leonard Salcido is a 89 year old male with history of anemia, dementia, DM, HLD, HTN, prostate CA who presented to the ED for evaluation of a syncopal episode.  This was witnessed by son.  Patient apparently ate dinner, walked into the bedroom, came back out and sat down at the kitchen table.  Started shivering and eyes started to flutter, teary.  He became less responsive, almost sliding off his chair.  Was out for a few minutes per son.  Prior to that, he was at baseline health with no complaints.  At this time he denies any complaints.  Per son patient has reported poor sleep in the last few days.  Son also notes patient has missed follow-up with oncology.    At presentation, vital stable.  Labs with sodium 146, potassium 5.3, otherwise stable  EKG with sinus rhythm with first-degree AV block, LAD, LBBB.  Unchanged from prior  CXR: Mild linear opacity left lung base likely atelectasis or scarring.  Otherwise no acute cardiopulmonary disease.     Chart reviewed: Had similar episode in 2024 while at the [facility].  Workup was essentially negative except for acute kidney injury.  Echocardiogram showed EF 50 to 55%.  Unable to assess diastolic function.  Hospitalized in 2024 for chest pain.  Had cardiac workup including stress test that was negative.  Followed by cardiology.    History/Other:      Past Medical History:  Past Medical History:    Allergic rhinitis    Anemia    Anemia due to blood loss    Aspirin long-term use     Cancer (HCC)    Chronic low back pain    lumbar spine; now s/p lumbar fusion    Dementia (HCC)    Diabetes (HCC)    Diabetes mellitus, type 2 (pick complications) (HCC)    History of diverticulosis    Lipid screening    Other and unspecified hyperlipidemia    Other male erectile dysfunction    Rectal bleeding    hemorrhoidal bleeding    Rotator cuff tear, left    Unspecified essential hypertension        Past Surgical History:   Past Surgical History:   Procedure Laterality Date    Appendectomy      Colonoscopy N/A 5/4/2021    Procedure: COLONOSCOPY;  Surgeon: Ulysses Harley MD;  Location: [facility] ENDOSCOPY    Laminectomy  1987    Lumbar spine fusion combined  8-8-13    Repair rotator cuff,acute Left 1993    Tonsillectomy         Social History:  reports that he quit smoking about 53 years ago. His smoking use included cigarettes. He started smoking about 73 years ago. He has a 20 pack-year smoking history. He has never used smokeless tobacco. He reports that he does not drink alcohol and does not use drugs.    Family History:   Family History   Problem Relation Age of Onset    Heart Disease Father 70        CAD    Cancer Brother 83    Prostate Cancer Son 57    Cancer Brother 81        portion of bowel    Breast Cancer Neg     Pancreatic Cancer Neg     Ovarian Cancer Neg     Colon Cancer Neg        Allergies: Allergies[1]    Medications:  Medications Ordered Prior to Encounter[2]    Review of Systems:   A comprehensive 14 point review of systems was completed.    Pertinent positives and negatives noted in the HPI.    Objective:     BP (!) 161/87   Pulse 64   Temp 99 °F (37.2 °C) (Oral)   Resp 13   SpO2 100%   General: No acute distress.    HEENT: Normocephalic, atraumatic.  Neck: Supple.  Respiratory: Normal effort.  CTAB  Cardiovascular: S1, S2 regular.  Normal rate.  No murmur.   Abdomen: Soft, nontender distended.  Neurologic: Alert, Oriented to person and place.  Nonfocal.  Musculoskeletal: No tenderness or  deformity.  Extremities: No edema  Psychiatric: Calm and appropriate    Results:      Labs:  Labs Last 24 Hours:   BMP     CBC    Other     Na 146 Cl 108 BUN 27 Glu 143   Hb 10.7   PTT - Procal -   K 5.3 CO2 32.0 Cr 1.17   WBC 4.6 >< .0  INR - CRP -   Renal Lytes Endo    Hct 32.5   Trop - D dim -   eGFR - Ca 10.2 POC Gluc  129    LFT   pBNP - Lactic -   eGFR AA - PO4 - A1c -   AST 22 APk 77 Prot 7.6  LDL -     Mg - TSH -   ALT 14 T fabienne 0.2 Alb 4.2          COVID-19 Lab Results    COVID-19  Lab Results   Component Value Date    COVID19 Not Detected 12/06/2022    COVID19 Not Detected 05/02/2021       Pro-Calcitonin  No results for input(s): \"PCT\" in the last 168 hours.    Cardiac  No results for input(s): \"TROP\", \"PBNP\" in the last 168 hours.    Creatinine Kinase  No results for input(s): \"CK\" in the last 168 hours.    Inflammatory Markers  No results for input(s): \"CRP\", \"FRANCISCO\", \"LDH\", \"DDIMER\" in the last 168 hours.    Imaging: Imaging data reviewed in Epic.    Assessment & Plan:    Unresponsiveness: Syncope versus other  -Patient observation  -Head CT  -Telemetry  -Orthostatic blood pressure  -Last echo June 2024.  Defer repeat to cardiology  -Consult cardiology.    Mild hypernatremia, possibly mild dehydration.    Azotemia, Creatinine above baseline  Hyperkalemia  -Got bolus fluids in ED  -Gentle hydration  -Recheck potassium and treat accordingly.  -If persistent hold lisinopril/ HCTZ.    DM 2  -Hold oral hypoglycemics  -Cover with SSI    History of prostate cancer  HTN  HLD  Dementia  -Resume home regimen when reconciled    Quality:  DVT Prophylaxis: Full code  CODE status: 1 to 2 days  DANIEL: TBD      Plan of care discussed with patient and son at bedside. Acknowledged understanding and agrees to plan. Also discussed with the ED physician.  On-call cardiology APN.    High MDM  Time spent on this admission - examining patient, obtaining history, reviewing previous medical records, going over test  results/imaging and discussing plan of care. More than 50% of the time was spent in counseling and/or coordination of care related to syncope, possible dehydration.   All questions answered.     Suly Castillo MD  12/21/2024                   [1]   Allergies  Allergen Reactions    Eligard [Leuprolide] RASH     Rash with large sized blisters   [2]   Current Facility-Administered Medications on File Prior to Encounter   Medication Dose Route Frequency Provider Last Rate Last Admin    leuprolide (ELIGARD) depot injection 45 mg  45 mg Subcutaneous Q6 Months Alicia Bañuelos MD   45 mg at 01/07/22 1638     Current Outpatient Medications on File Prior to Encounter   Medication Sig Dispense Refill    Mirabegron ER (MYRBETRIQ) 50 MG Oral Tablet 24 Hr Take 1 tablet (50 mg total) by mouth daily. 30 tablet 0    amLODIPine 5 MG Oral Tab Take 1 tablet (5 mg total) by mouth daily. 90 tablet 3    metFORMIN  MG Oral Tablet 24 Hr Take 1 tablet (500 mg total) by mouth daily. 90 tablet 0    pantoprazole 40 MG Oral Tab EC Take 1 tablet (40 mg total) by mouth before breakfast. 90 tablet 0    donepezil (ARICEPT) 5 MG Oral Tab Take 1 tablet (5 mg total) by mouth nightly. 90 tablet 1    lisinopril-hydroCHLOROthiazide 20-12.5 MG Oral Tab Take 2 tablets by mouth daily. 180 tablet 3    tamsulosin 0.4 MG Oral Cap Take 1 capsule (0.4 mg total) by mouth daily. 90 capsule 1    Lovastatin 20 MG Oral Tab Take 1 tablet (20 mg total) by mouth nightly. 90 tablet 3    Ferrous Sulfate 325 (65 Fe) MG Oral Tab Take 1 tablet (325 mg total) by mouth daily with breakfast. 90 tablet 1    aspirin 81 MG Oral Tab EC Take 1 tablet (81 mg total) by mouth daily. (Patient not taking: Reported on 8/13/2024)

## 2024-12-22 NOTE — CONSULTS
Cardiology Consult Note    Leonard Salcido Patient Status:  Observation    3/20/1935 MRN [id number]   Location [facility] 3W/SW Attending Ritesh Lazcano MD   Hosp Day # 0 PCP Ayden Samano MD     HPI.  Leonard Salcido is a 89 year old male with a history of dementia, diabetes, hyperlipidemia, hypertension and prostate cancer presented to the ED after witnessed syncopal episode at home.  Patient sat down, son described shivering, shaking and eye fluttering followed by syncope lasting several minutes.  Other than hypertension no other significant findings on vitals at time of triage.  Borderline elevation in troponin, 55 otherwise no significant lab findings.  EKG with normal sinus rhythm, first-degree AV block and left bundle branch block.      Prior cardiac workup  Nuclear stress test 2024  EF 60%  No perfusion defects        --------------------------------------------------------------------------------------------------------------------------------  ROS 10 systems reviewed, pertinent findings above.  ROS    History:  Past Medical History:    Allergic rhinitis    Anemia    Anemia due to blood loss    Aspirin long-term use    Cancer (HCC)    Chronic low back pain    lumbar spine; now s/p lumbar fusion    Dementia (HCC)    Diabetes (HCC)    Diabetes mellitus, type 2 (pick complications) ([facility])    History of diverticulosis    Lipid screening    Other and unspecified hyperlipidemia    Other male erectile dysfunction    Rectal bleeding    hemorrhoidal bleeding    Rotator cuff tear, left    Unspecified essential hypertension     Past Surgical History:   Procedure Laterality Date    Appendectomy      Colonoscopy N/A 2021    Procedure: COLONOSCOPY;  Surgeon: Ulysses Harley MD;  Location: [facility] ENDOSCOPY    Laminectomy      Lumbar spine fusion combined  13    Repair rotator cuff,acute Left     Tonsillectomy       Family History   Problem Relation Age of Onset    Heart Disease Father 70         CAD    Cancer Brother 83    Prostate Cancer Son 57    Cancer Brother 81        portion of bowel    Breast Cancer Neg     Pancreatic Cancer Neg     Ovarian Cancer Neg     Colon Cancer Neg       reports that he quit smoking about 53 years ago. His smoking use included cigarettes. He started smoking about 73 years ago. He has a 20 pack-year smoking history. He has never used smokeless tobacco. He reports that he does not drink alcohol and does not use drugs.    Objective:   Temp: 98.6 °F (37 °C)  Pulse: 93  Resp: 18  BP: 134/105    Intake/Output:     Intake/Output Summary (Last 24 hours) at 12/22/2024 1146  Last data filed at 12/22/2024 0900  Gross per 24 hour   Intake 536.25 ml   Output 500 ml   Net 36.25 ml       Physical Exam:     General: Alert and oriented x 3, no acute distress  HEENT: Normocephalic, anicteric sclera, neck supple.  Neck: No JVD, carotids 2+, no bruits.  Cardiac: Regular rate and rhythm. S1, S2 normal. No murmur, pericardial rub, S3.  Lungs: Clear without wheezes, rales, rhonchi or dullness.  Normal excursions and effort.  Abdomen: Soft, non-tender. BS-present.  Extremities: Without clubbing, cyanosis or edema.  Peripheral pulses are 2+.  Neurologic: Non-focal  Skin: Warm and dry.       Assessment:    Syncope  Dementia  Diabetes  Hyperlipidemia  Hypertension  Prostate cancer      Plan:  Stress test earlier this year normal, no perfusion defects and EF 60%  Underlying conduction abnormalities including left bundle branch block, first-degree AV block-high risk for rhythm related syncope  avoid alma blockers,   check echo and monitor on telemetry  Hydrate, avoid diuretics  Recommend MCT at time of discharge if no rhythm abnormalities on telemetry while inpatient    Thank you for allowing me to take part in the care of Leonard Salcido. Please call with any questions of concerns.    Level of care: C5    Dr. Tosha Cabrera, DO  December 22, 2024  11:46 AM

## 2024-12-22 NOTE — ED PROVIDER NOTES
Patient Seen in: [facility] 3w/sw      History   No chief complaint on file.    Stated Complaint: Syncope    Subjective:   HPI    89-year-old male  presenting for a syncopal episode.  Is brought in by his son who provides additional history.  Patient in normal state of health the past few days.  Was at the dinner table and had an episode of witnessed loss of consciousness, followed by some trembling.  There is no generalized shaking or seizure activity.  For any chest pain or does not quite recall the events.  He denies any headache or head injury.       Objective:     No pertinent past medical history.            No pertinent past surgical history.              No pertinent social history.                Physical Exam     ED Triage Vitals   BP 12/21/24 1709 160/74   Pulse 12/21/24 1709 67   Resp 12/21/24 1709 20   Temp 12/21/24 1709 99 °F (37.2 °C)   Temp src 12/21/24 1709 Oral   SpO2 12/21/24 1709 99 %   O2 Device 12/21/24 1830 None (Room air)       Current Vitals:   Vital Signs  BP: (!) 182/82 (PRN hydralazine given)  Pulse: 60  Resp: 18  Temp: 99 °F (37.2 °C)  Temp src: Oral  MAP (mmHg): (!) 112    Oxygen Therapy  SpO2: 99 %  O2 Device: None (Room air)        Physical Exam  Constitutional: awake, alert, no sig distress  HENT: mmm, no lesions,  Neck: normal range of motion, no tenderness, supple.  Eyes: PERRL, EOMI, conjunctiva normal, no discharge. Sclera anicteric.  Cardiovascular: rr no murmur  Respiratory: Normal breath sounds, no respiratory distress, no wheezing, no chest tenderness.  GI: Bowel sounds normal, Soft, no tenderness, no masses, no pulsatile masses.  : No CVA tenderness.  Skin: Warm, dry, no erythema, no rash.  Musculoskeletal: Intact distal pulses, no edema, no tenderness, no cyanosis, no clubbing. Good range of motion in all major joints. No tenderness to palpation or major deformities noted. Back- No tenderness.  Neurologic: Alert & oriented x 3, normal motor function, normal  sensory function, no focal deficits noted.  Psych: Calm, cooperative, nl affect        ED Course     Labs Reviewed   CBC WITH DIFFERENTIAL WITH PLATELET - Abnormal; Notable for the following components:       Result Value    HGB 10.7 (*)     HCT 32.5 (*)     All other components within normal limits   COMP METABOLIC PANEL (14) - Abnormal; Notable for the following components:    Glucose 143 (*)     Sodium 146 (*)     Potassium 5.3 (*)     BUN 27 (*)     BUN/CREA Ratio 23.1 (*)     Calculated Osmolality 310 (*)     All other components within normal limits   TROPONIN I HIGH SENSITIVITY - Abnormal; Notable for the following components:    Troponin I (High Sensitivity) 55 (*)     All other components within normal limits   LIPID PANEL - Abnormal; Notable for the following components:    HDL Cholesterol 37 (*)     Triglycerides 198 (*)     LDL Cholesterol 108 (*)     VLDL 34 (*)     Non HDL Chol 143 (*)     All other components within normal limits   HEMOGLOBIN A1C - Abnormal; Notable for the following components:    HgbA1C 7.1 (*)     Estimated Average Glucose 157 (*)     All other components within normal limits   POCT GLUCOSE - Abnormal; Notable for the following components:    POC Glucose  129 (*)     All other components within normal limits   POCT GLUCOSE - Abnormal; Notable for the following components:    POC Glucose  122 (*)     All other components within normal limits   BASIC METABOLIC PANEL (8)     EKG    Rate, intervals and axes as noted on EKG Report.  Rate: 69  Rhythm: Sinus Rhythm  Reading: SR w/ 1st degree avb, left axis, lbbb, no stemi, qtc 501                Remarkable for chronic troponin elevation.  There is mild elevation of potassium of 5.3 which does not have associated EKG change     MDM      89M history as documented above presenting with a syncopal episode on arrival vitals are stable and reassuring.  No clear prodromal symptom.  Which raises concern for cardiac etiology.  Will plan to observe  patient on telemetry.  Discussed with hospitalist.  Labs remarkable for chronic troponin elevation, mild hypernatremia, hyperkalemia which was addressed with IV fluids.  Ddx: Transient arrhythmia, ACS, vasovagal syncope, orthostasis  Admission disposition: 12/21/2024  8:09 PM           Medical Decision Making      Disposition and Plan     Clinical Impression:  1. Syncope, unspecified syncope type         Disposition:  Admit  12/21/2024  8:09 pm    Follow-up:  No follow-up provider specified.  We recommend that you schedule follow up care with a primary care provider within the next three months to obtain basic health screening including reassessment of your blood pressure.      Medications Prescribed:  Current Discharge Medication List              Supplementary Documentation:         Hospital Problems       Present on Admission  Date Reviewed: 12/21/2024            ICD-10-CM Noted POA    * (Principal) Syncope, unspecified syncope type R55 12/21/2024 Yes    Hypernatremia E87.0 12/21/2024 Yes    Syncope R55 12/21/2024 Unknown

## 2024-12-22 NOTE — PLAN OF CARE
Problem: Patient Centered Care  Goal: Patient preferences are identified and integrated in the patient's plan of care  Description: Interventions:  - What would you like us to know as we care for you?   - Provide timely, complete, and accurate information to patient/family  - Incorporate patient and family knowledge, values, beliefs, and cultural backgrounds into the planning and delivery of care  - Encourage patient/family to participate in care and decision-making at the level they choose  - Honor patient and family perspectives and choices  Outcome: Progressing     Problem: Patient/Family Goals  Goal: Patient/Family Long Term Goal  Description: Patient's Long Term Goal:     Interventions:  - See additional Care Plan goals for specific interventions  Outcome: Progressing  Goal: Patient/Family Short Term Goal  Description: Patient's Short Term Goal:     Interventions:  - See additional Care Plan goals for specific interventions  Outcome: Progressing     Problem: CARDIOVASCULAR - ADULT  Goal: Maintains optimal cardiac output and hemodynamic stability  Description: INTERVENTIONS:  - Monitor vital signs, rhythm, and trends  - Monitor for bleeding, hypotension and signs of decreased cardiac output  - Evaluate effectiveness of vasoactive medications to optimize hemodynamic stability  - Monitor arterial and/or venous puncture sites for bleeding and/or hematoma  - Assess quality of pulses, skin color and temperature  - Assess for signs of decreased coronary artery perfusion - ex. Angina  - Evaluate fluid balance, assess for edema, trend weights  Outcome: Progressing  Goal: Absence of cardiac arrhythmias or at baseline  Description: INTERVENTIONS:  - Continuous cardiac monitoring, monitor vital signs, obtain 12 lead EKG if indicated  - Evaluate effectiveness of antiarrhythmic and heart rate control medications as ordered  - Initiate emergency measures for life threatening arrhythmias  - Monitor electrolytes and administer  replacement therapy as ordered  Outcome: Progressing     Problem: MUSCULOSKELETAL - ADULT  Goal: Return mobility to safest level of function  Description: INTERVENTIONS:  - Assess patient stability and activity tolerance for standing, transferring and ambulating w/ or w/o assistive devices  - Assist with transfers and ambulation using safe patient handling equipment as needed  - Ensure adequate protection for wounds/incisions during mobilization  - Obtain PT/OT consults as needed  - Advance activity as appropriate  - Communicate ordered activity level and limitations with patient/family  Outcome: Progressing

## 2024-12-22 NOTE — PHYSICAL THERAPY NOTE
PHYSICAL THERAPY EVALUATION - INPATIENT     Room Number: 307/307-A  Evaluation Date: 12/22/2024  Type of Evaluation: Initial   Physician Order: PT Eval and Treat    Presenting Problem: syncope  Co-Morbidities : dementia  Reason for Therapy: Mobility Dysfunction and Discharge Planning    PHYSICAL THERAPY ASSESSMENT   Patient is a 89 year old male admitted 12/21/2024 for syncope.  Prior to admission, patient's baseline is independent with ADLs and functional mobility with RW.  Patient is currently functioning below baseline with bed mobility, transfers, gait, stair negotiation, standing prolonged periods, and performing household tasks.  Patient is requiring contact guard assist as a result of the following impairments: decreased functional strength, impaired dynamic standing balance, decreased muscular endurance, and medical status.  Physical Therapy will continue to follow for duration of hospitalization.    Patient will benefit from continued skilled PT Services at discharge to promote prior level of function and safety with additional support and return home with home health PT.    PLAN DURING HOSPITALIZATION  Nursing Mobility Recommendation : 1 Assist  PT Device Recommendation: Rolling walker;Gait belt  PT Treatment Plan: Bed mobility;Body mechanics;Endurance;Energy conservation;Patient education;Gait training;Strengthening;Stair training;Transfer training;Balance training  Rehab Potential : Good  Frequency (Obs): 5x/week     PHYSICAL THERAPY MEDICAL/SOCIAL HISTORY     Problem List  Principal Problem:    Syncope, unspecified syncope type  Active Problems:    Syncope    Hypernatremia    HOME SITUATION  Type of Home:  (Two flat; lives on main level)  Home Layout: Two level;Able to live on main level  Stairs to Enter : 2   Lives With: Son    Drives: No   Patient Regularly Uses: Rolling walker     Prior Level of [place]: independent     SUBJECTIVE  Agreeable to activity.    PHYSICAL THERAPY EXAMINATION    OBJECTIVE  Precautions: Bed/chair alarm  Fall Risk: High fall risk    WEIGHT BEARING RESTRICTION  none    PAIN ASSESSMENT  Ratin    COGNITION  Overall Cognitive Status:  WFL - within functional limits    RANGE OF MOTION AND STRENGTH ASSESSMENT  Upper extremity ROM and strength are within functional limits.  Lower extremity ROM is within functional limits.  Lower extremity strength is within functional limits.    BALANCE  Static Sitting: Good  Dynamic Sitting: Fair +  Static Standing: Fair  Dynamic Standing: Fair -    ACTIVITY TOLERANCE  BP: (!) 145/97 (sitting EOB; 132/81 standing; 134/105 post-activity sitting in chair)  BP Location: Left arm  BP Method: Automatic  Patient Position: Sitting    AM-PAC '6-Clicks' INPATIENT SHORT FORM - BASIC MOBILITY  How much difficulty does the patient currently have...  Patient Difficulty: Turning over in bed (including adjusting bedclothes, sheets and blankets)?: A Little   Patient Difficulty: Sitting down on and standing up from a chair with arms (e.g., wheelchair, bedside commode, etc.): A Little   Patient Difficulty: Moving from lying on back to sitting on the side of the bed?: A Little   How much help from another person does the patient currently need...   Help from Another: Moving to and from a bed to a chair (including a wheelchair)?: A Little   Help from Another: Need to walk in hospital room?: A Little   Help from Another: Climbing 3-5 steps with a railing?: A Little     AM-PAC Score:  Raw Score: 18   Approx Degree of Impairment: 46.58%   Standardized Score (AM-PAC Scale): 43.63   CMS Modifier (G-Code): CK    FUNCTIONAL ABILITY STATUS  Functional Mobility/Gait Assessment  Gait Assistance: Contact guard assist  Distance (ft): 15  Assistive Device: Rolling walker  Pattern: Shuffle (slow, forward flexed posture, cues given to maintain proximity to RW)  Supine to Sit: contact guard assist  Sit to Stand: contact guard assist    Exercise/Education Provided:  Education  Provided To: Patient     Patient Education: Role of Physical Therapy;Plan of Care;DME Recommendations;Functional Transfer Techniques;Fall Prevention;Posture/Positioning;Energy Conservation;Proper Body Mechanics;Gait Training     Patient's Response to Education: Verbalized Understanding;Returned Demonstration;Requires Further Education     Skilled Therapy Provided: Pt received resting in bed and agreeable to activity. Demos bed mobility with increased time and CGA. Demos STS transfer to/from bed with RW and CGA. Orthostatic vitals monitored sitting at EOB and standing. Ambulated in-room distance with RW and CGA, slow and kyphotic posture, shuffled gait but not overt LOB. Cues given to maintain proximity to RW. Pt was left sitting in chair with alarm on, needs within reach, handoff to RN complete.      The patient's Approx Degree of Impairment: 46.58% has been calculated based on documentation in the [facility] '6 clicks' Inpatient Basic Mobility Short Form.  Research supports that patients with this level of impairment may benefit from home with HH PT.  Final disposition will be made by interdisciplinary medical team.    Patient End of Session: Up in chair;Needs met;Call light within reach;RN aware of session/findings;All patient questions and concerns addressed;Hospital anti-slip socks;Alarm set    CURRENT GOALS  Goals to be met by: 1/1/24  Patient Goal Patient's self-stated goal is: go home   Goal #1 Patient is able to demonstrate supine - sit EOB @ level: supervision     Goal #1   Current Status    Goal #2 Patient is able to demonstrate transfers Sit to/from Stand at assistance level: supervision with walker - rolling     Goal #2  Current Status    Goal #3 Patient is able to ambulate 150 feet with assist device: walker - rolling at assistance level: supervision   Goal #3   Current Status    Goal #4 Patient will negotiate 2 stairs/one curb w/ assistive device and supervision   Goal #4   Current Status    Goal #5 Patient  to demonstrate independence with home activity/exercise instructions provided to patient in preparation for discharge.   Goal #5   Current Status    Goal #6    Goal #6  Current Status      Patient Evaluation Complexity Level:  History Moderate - 1 or 2 personal factors and/or co-morbidities   Examination of body systems Moderate - addressing a total of 3 or more elements   Clinical Presentation  Moderate - Evolving   Clinical Decision Making  Moderate Complexity     Therapeutic Activity:  15 minutes

## 2024-12-22 NOTE — PROGRESS NOTES
[facility]  part of [facility]    Progress Note    Leonard Salcido Patient Status:  Observation    3/20/1935 MRN [id number]   Location [facility] 3W/SW Attending Ritesh Lazcano MD   Hosp Day # 0 PCP Ayden Samano MD       Subjective:     No complaints.  Wants to go home.  Eating well.    Objective:   Blood pressure 145/85, pulse 92, temperature 98 °F (36.7 °C), temperature source Oral, resp. rate 17, weight 166 lb 8 oz (75.5 kg), SpO2 97%.    Gen:   NAD.  A and O x 3  CV:   RRR, no m/g/r  Pulm:   CTA bilat  Abd:   +bs, soft, NT, ND  LE:   No c/c/e  Neuro:   nonfocal    Results:     Lab Results   Component Value Date    WBC 4.6 2024    HGB 10.7 (L) 2024    HCT 32.5 (L) 2024    .0 2024    CREATSERUM 1.00 2024    BUN 24 (H) 2024     2024    K 4.3 2024     2024    CO2 29.0 2024     (H) 2024    CA 9.8 2024    ALB 4.2 2024    ALKPHO 77 2024    BILT 0.2 2024    TP 7.6 2024    AST 22 2024    ALT 14 2024    PTT 34.8 2021    INR 1.09 2021    PT 13.5 05/10/2011    TSH 3.220 2022    LIP 28 2024    PSA <0.04 2024    DDIMER 0.65 2024    ESRML 60 (H) 2022    CRP 0.48 (H) 2022    MG 2.0 2024       CT BRAIN OR HEAD (CPT=70450)    Result Date: 2024  CONCLUSION:  1. No acute intracranial finding. 2. Changes of chronic small vessel disease in cerebral white matter. 3. Large vessel intracranial atherosclerosis.    Dictated by (CST): Michael Paniagua MD on 2024 at 8:12 AM     Finalized by (CST): Michael Paniagua MD on 2024 at 8:14 AM          XR CHEST AP PORTABLE  (CPT=71045)    Result Date: 2024  CONCLUSION:   Mild linear opacity left lung base likely atelectasis or scarring.  Otherwise no acute cardiopulmonary disease.    Dictated by (CST): Johnny Fox MD on 2024 at 6:49 PM      Finalized by (CST): Johnny Fox MD on 12/21/2024 at 6:50 PM         EKG 12 Lead    Result Date: 12/22/2024  Sinus rhythm with 1st degree A-V block Left axis deviation Left bundle branch block Abnormal ECG When compared with ECG of 03-AUG-2024 07:04, No significant change was found Confirmed by Tosha Cabrera ([id number]) on 12/22/2024 11:48:30 AM     Assessment and Plan:     Syncope  Likely mild dehydration  - cardiology on consult  - labs ok  - ct head and cxr ok  - f/u echo  - was hydrated with IVF  - eating well     Mild hypernatremia - resolved  Cr above baseline - improved  Hyperkalemia - resolved  - was given IVF     DM 2  - Hold oral hypoglycemics  - Cover with SSI     History of prostate cancer  HTN  HLD  Dementia     dvt proph:    heparin      Code status:    Full       MDM:    High Ritesh Tan MD  12/22/2024

## 2024-12-22 NOTE — ED QUICK NOTES
Orders for admission, patient is aware of plan and ready to go upstairs. Any questions, please call ED RN Diane at extension [phone].     Patient Covid vaccination status: Fully vaccinated     COVID Test Ordered in ED: None    COVID Suspicion at Admission: N/A    Running Infusions:  None    Mental Status/LOC at time of transport: A/O 1-3 fluctuations hx dementia     Other pertinent information:   CIWA score: N/A   NIH score:  N/A

## 2024-12-22 NOTE — PLAN OF CARE
Leonard Salcido Patient Status:  Observation    3/20/1935 MRN [id number]   Location [facility] 3W/SW Attending Suly Castillo MD   Hosp Day # 0 PCP Ayden Samano MD       Cardiology Nocturnal APN Note    Page Received: Hospitalist    HPI:     Patient is a 89 year old male with PMH of HTN, prostate cancer, dementia and DM II who presented to the ED after a witnessed syncopal episode. Family reported pt was sitting at the dinner table when he lost consciousness. Pt did not recall the incident and denied chest pain, dyspnea or any other symptoms. Cardiac work up in ED showed an elevated troponin that is chronic. [facility] consulted for syncope. Nuclear stress test completed 2024 was unremarkable. Echocardiogram completed 2024 showed normal left ventricular cavity size. Mildly increased wall thickness. EF 50-55%. HPI obtained from chart review and information provided by ED physician.         ED Clinical Course    EKG: Sinus rhythm with 1st degree AV block. LBBB    Labs: Troponin 55, , K+ 5.3, Hgb 10.7    Imaging: CXR showed mild linear opacity in the left lung base likely atelectasis or scarring.     Medications: N/A        Assessment/Plan:    - 2D echocariogram pending  - Continue to monitor overnight on telemetry  - Formal cardiology consult to follow in AM.       PRASHANTH Luna  [facility]  2024  3:54 AM

## 2024-12-22 NOTE — ED QUICK NOTES
Patient's son reports after eating dinner, pt went to his room and came back to the kitchen, sat down and started to shiver and his eyes started to flutter per patient's son. Per son pt was \"out\" for a few minutes.

## 2024-12-22 NOTE — ED QUICK NOTES
Rounding Completed    Plan of Care reviewed. Waiting for MD assessment.  Elimination needs assessed.      Bed is locked and in lowest position. Call light within reach.

## 2024-12-22 NOTE — PLAN OF CARE
Patient alert and oriented x 3 on room air. Patient denies pain. Echo and head CT done today. Orthostatics done today. Call light within reach.     Problem: Patient Centered Care  Goal: Patient preferences are identified and integrated in the patient's plan of care  Description: Interventions:  - What would you like us to know as we care for you? I am from home alone.   - Provide timely, complete, and accurate information to patient/family  - Incorporate patient and family knowledge, values, beliefs, and cultural backgrounds into the planning and delivery of care  - Encourage patient/family to participate in care and decision-making at the level they choose  - Honor patient and family perspectives and choices  Outcome: Progressing     Problem: Patient/Family Goals  Goal: Patient/Family Long Term Goal  Description: Patient's Long Term Goal:     Interventions:  -   - See additional Care Plan goals for specific interventions  Outcome: Progressing  Goal: Patient/Family Short Term Goal  Description: Patient's Short Term Goal:     Interventions:   -   - See additional Care Plan goals for specific interventions  Outcome: Progressing     Problem: CARDIOVASCULAR - ADULT  Goal: Maintains optimal cardiac output and hemodynamic stability  Description: INTERVENTIONS:  - Monitor vital signs, rhythm, and trends  - Monitor for bleeding, hypotension and signs of decreased cardiac output  - Evaluate effectiveness of vasoactive medications to optimize hemodynamic stability  - Monitor arterial and/or venous puncture sites for bleeding and/or hematoma  - Assess quality of pulses, skin color and temperature  - Assess for signs of decreased coronary artery perfusion - ex. Angina  - Evaluate fluid balance, assess for edema, trend weights  Outcome: Progressing  Goal: Absence of cardiac arrhythmias or at baseline  Description: INTERVENTIONS:  - Continuous cardiac monitoring, monitor vital signs, obtain 12 lead EKG if indicated  - Evaluate  effectiveness of antiarrhythmic and heart rate control medications as ordered  - Initiate emergency measures for life threatening arrhythmias  - Monitor electrolytes and administer replacement therapy as ordered  Outcome: Progressing     Problem: MUSCULOSKELETAL - ADULT  Goal: Return mobility to safest level of function  Description: INTERVENTIONS:  - Assess patient stability and activity tolerance for standing, transferring and ambulating w/ or w/o assistive devices  - Assist with transfers and ambulation using safe patient handling equipment as needed  - Ensure adequate protection for wounds/incisions during mobilization  - Obtain PT/OT consults as needed  - Advance activity as appropriate  - Communicate ordered activity level and limitations with patient/family  Outcome: Progressing     Problem: Diabetes/Glucose Control  Goal: Glucose maintained within prescribed range  Description: INTERVENTIONS:  - Monitor Blood Glucose as ordered  - Assess for signs and symptoms of hyperglycemia and hypoglycemia  - Administer ordered medications to maintain glucose within target range  - Assess barriers to adequate nutritional intake and initiate nutrition consult as needed  - Instruct patient on self management of diabetes  Outcome: Progressing

## 2024-12-22 NOTE — OCCUPATIONAL THERAPY NOTE
OCCUPATIONAL THERAPY EVALUATION - INPATIENT     Room Number: 307/307-A  Evaluation Date: 12/22/2024  Type of Evaluation: Initial  Presenting Problem: Syncope    Physician Order: IP Consult to Occupational Therapy  Reason for Therapy: ADL/IADL Dysfunction and Discharge Planning    OCCUPATIONAL THERAPY ASSESSMENT   Patient is a 89 year old male admitted 12/21/2024 for syncope.  Prior to admission, patient's baseline is home with son; supervision for ambulation/SBA for ADLs.  Patient is currently functioning near baseline with self care and basic mobiltiy; orthostatics negative.  Patient is requiring up to Min A with ADLs/CGA-SBA for fxl mobility as a result of the following impairments: activity tolerance, deconditioning. Occupational Therapy will continue to follow for duration of hospitalization.    Patient will benefit from continued skilled OT Services at discharge to promote prior level of function and safety with additional support and return home with home health OT.    PLAN DURING HOSPITALIZATION  OT Device Recommendations: TBD  OT Treatment Plan: Balance activities;Energy conservation/work simplification techniques;ADL training;Functional transfer training;Endurance training;Patient/Family education;Patient/Family training;Compensatory technique education     OCCUPATIONAL THERAPY MEDICAL/SOCIAL HISTORY   Problem List   Principal Problem:    Syncope, unspecified syncope type  Active Problems:    Syncope    Hypernatremia    HOME SITUATION  Type of Home: -- (Two flat; lives on main level)  Home Layout: Two level; Able to live on main level  Lives With: Son  Toilet and Equipment: Standard height toilet  Shower/Tub and Equipment: Tub-shower combo  Drives: No  Patient Regularly Uses: Rolling walker    SUBJECTIVE  I stay awake at night thinking about the past.     OCCUPATIONAL THERAPY EXAMINATION   OBJECTIVE  Precautions: Bed/chair alarm  Fall Risk: High fall risk    WEIGHT BEARING RESTRICTION     PAIN  ASSESSMENT  Ratin    ACTIVITY TOLERANCE           BP: (!) 134/105 (145/97, 132/81)             O2 SATURATIONS       COGNITION  Dementia noted in chart  Pt does not recall events leading up to admission  AO3  Follows all commands; sufficient attention to task; cues to for safety    RANGE OF MOTION   Upper extremity ROM is within functional limits     STRENGTH ASSESSMENT  Upper extremity strength is within functional limits    ACTIVITIES OF DAILY LIVING ASSESSMENT  AM-PAC ‘6-Clicks’ Inpatient Daily Activity Short Form  How much help from another person does the patient currently need…  -   Putting on and taking off regular lower body clothing?: A Little  -   Bathing (including washing, rinsing, drying)?: A Little  -   Toileting, which includes using toilet, bedpan or urinal? : A Little  -   Putting on and taking off regular upper body clothing?: A Little  -   Taking care of personal grooming such as brushing teeth?: None  -   Eating meals?: None    AM-PAC Score:  Score: 20  Approx Degree of Impairment: 38.32%  Standardized Score (AM-PAC Scale): 42.03  CMS Modifier (G-Code): CJ    FUNCTIONAL TRANSFER ASSESSMENT  Sit to Stand: Edge of Bed  Edge of Bed: Contact Guard Assist    BED MOBILITY  Rolling: Stand-by Assist  Supine to Sit : Contact Guard Assist  Sit to Supine (OT): Contact Guard Assist  Scooting: CGA    BALANCE ASSESSMENT  Static Sitting: Stand-by Assist  Static Standing: Contact Guard Assist    FUNCTIONAL ADL ASSESSMENT  Eating: Stand-by Assist  Grooming Seated: Stand-by Assist  Bathing Seated: Contact Guard Assist  UB Dressing Seated: Stand-by Assist  LB Dressing Seated: Minimal Assist  Toileting Seated: Minimal Assist    THERAPEUTIC EXERCISE     Skilled Therapy Provided: Assessed and monitored pt's BP in all position changes; stable at rest and with activities; pt encouraged to pace tasks and allow increased time with all transitional movements; bed mobiltiy with CGA; assist to don socks; pt stood to RW  with CGA; ambulatory reasonable distances in room with cues to manage RW; pt tolerated all tasks presented; fxl t/f witih CGA; tolerating bathroom distnace and back; will benefit from supervision and assist at d/c for safety. Continue skilled occupational therapy while IP to maximize patient function and increase patient participation, safety, and independence with basic ADL and everyday activities.       EDUCATION PROVIDED  Patient Education : Role of Occupational Therapy; Plan of Care; Discharge Recommendations; Functional Transfer Techniques; Fall Prevention; Proper Body Mechanics  Patient's Response to Education: Verbalized Understanding    The patient's Approx Degree of Impairment: 38.32% has been calculated based on documentation in the [facility] '6 clicks' Inpatient Daily Activity Short Form.  Research supports that patients with this level of impairment may benefit from home with HH; supervision.  Final disposition will be made by interdisciplinary medical team.    Patient End of Session: Up in chair;Needs met;Call light within reach;RN aware of session/findings;All patient questions and concerns addressed;Alarm set    OT Goals  Patient self-stated goal is: to return home     Patient will complete LE dressing with SBA   Comment:     Patient will complete toilet transfer with SBA   Comment:     Patient will complete self care task at sink level with SBA    Comment:     Comment:         Goals  on:   Frequency:3x week     Patient Evaluation Complexity Level:   Occupational Profile/Medical History LOW - Brief history including review of medical or therapy records    Specific performance deficits impacting engagement in ADL/IADL LOW  1 - 3 performance deficits    Client Assessment/Performance Deficits LOW - No comorbidities nor modifications of tasks    Clinical Decision Making LOW - Analysis of occupational profile, problem-focused assessments, limited treatment options    Overall Complexity LOW     OT  Session Time: 15 minutes  Self-Care Home Management: 15 minutes      Sree Arechiga, Occupational Therapist, OTR/L ext [phone]

## 2024-12-23 VITALS
TEMPERATURE: 98 F | DIASTOLIC BLOOD PRESSURE: 78 MMHG | OXYGEN SATURATION: 98 % | WEIGHT: 168.81 LBS | BODY MASS INDEX: 25 KG/M2 | SYSTOLIC BLOOD PRESSURE: 134 MMHG | RESPIRATION RATE: 18 BRPM | HEART RATE: 69 BPM

## 2024-12-23 LAB
GLUCOSE BLDC GLUCOMTR-MCNC: 127 MG/DL (ref 70–99)
GLUCOSE BLDC GLUCOMTR-MCNC: 152 MG/DL (ref 70–99)

## 2024-12-23 PROCEDURE — 99239 HOSP IP/OBS DSCHRG MGMT >30: CPT | Performed by: HOSPITALIST

## 2024-12-23 RX ORDER — LOVASTATIN 20 MG/1
40 TABLET ORAL NIGHTLY
Qty: 90 TABLET | Refills: 3 | Status: SHIPPED | OUTPATIENT
Start: 2024-12-23

## 2024-12-23 NOTE — CM/SW NOTE
12/23/24 1300   Discharge disposition   Expected discharge disposition Home-Health   Post Acute Care Provider   ([facility])   Discharge transportation Private car       Per chart, pt has DC order. Per son, pending cardiology.    [facility] notified of intended DC today.    Pt's son Maxwell confirmed he will transport pt home at [place].    Pt is cleared from SW/CM stand point. AMILCAR Paul and DC RN Teresa updated.    PLAN: Home w/ [facility]          JENNIFER Mixon, LSW [phone]

## 2024-12-23 NOTE — DISCHARGE INSTRUCTIONS
Home Health:  [facility] ([facility])  [address], [address], [place] [address]  Phone: [phone]  Fax: [phone]

## 2024-12-23 NOTE — PLAN OF CARE
Problem: Patient Centered Care  Goal: Patient preferences are identified and integrated in the patient's plan of care  Description: Interventions:  - What would you like us to know as we care for you?   - Provide timely, complete, and accurate information to patient/family  - Incorporate patient and family knowledge, values, beliefs, and cultural backgrounds into the planning and delivery of care  - Encourage patient/family to participate in care and decision-making at the level they choose  - Honor patient and family perspectives and choices  Outcome: Adequate for Discharge     Problem: Patient/Family Goals  Goal: Patient/Family Long Term Goal  Description: Patient's Long Term Goal:   - See additional Care Plan goals for specific interventions  Outcome: Adequate for Discharge  Goal: Patient/Family Short Term Goal  Care Plan goals for specific interventions  Outcome: Adequate for Discharge     Problem: CARDIOVASCULAR - ADULT  Goal: Maintains optimal cardiac output and hemodynamic stability  Description: INTERVENTIONS:  - Monitor vital signs, rhythm, and trends  - Monitor for bleeding, hypotension and signs of decreased cardiac output  - Evaluate effectiveness of vasoactive medications to optimize hemodynamic stability  - Monitor arterial and/or venous puncture sites for bleeding and/or hematoma  - Assess quality of pulses, skin color and temperature  - Assess for signs of decreased coronary artery perfusion - ex. Angina  - Evaluate fluid balance, assess for edema, trend weights  Outcome: Adequate for Discharge  Goal: Absence of cardiac arrhythmias or at baseline  Description: INTERVENTIONS:  - Continuous cardiac monitoring, monitor vital signs, obtain 12 lead EKG if indicated  - Evaluate effectiveness of antiarrhythmic and heart rate control medications as ordered  - Initiate emergency measures for life threatening arrhythmias  - Monitor electrolytes and administer replacement therapy as ordered  Outcome: Adequate  for Discharge

## 2024-12-23 NOTE — CM/SW NOTE
12/23/24 1300   CM/SW Referral Data   Referral Source Social Work (self-referral)   Reason for Referral Discharge planning   Informant Son   Medical Hx   Does patient have an established PCP? Yes  (Ayden Samano)   Patient Info   Patient's Current Mental Status at Time of Assessment   (sleeping)   Patient's Home Environment House   Number of Levels in Home 1   Number of Stair in Home 3 + 5 steps to enter   Patient lives with Alone   Patient Status Prior to Admission   Independent with ADLs and Mobility No   Pt. requires assistance with Housework;Driving;Ambulating   Services in place prior to admission DME/Supplies at home   Type of DME/Supplies Standard Walker   Discharge Needs   Anticipated D/C needs Home health care   Choice of Post-Acute Provider   Informed patient of right to choose their preferred provider Yes   List of appropriate post-acute services provided to patient/family with quality data No - Declined list   Patient/family choice [facility]  (due to hx)       SW met w/ pt's son Maxwell at bedside. Pt sleeping during this assessment after not wanting to speak w/ SW earlier this AM.    Above assessment completed. Verified pt's home address.  Confirmed pt lives alone. Per Maxwell, pt has 4 sons and a dtr plus grandchildren that are always going in/out of his home and checking on him.    Pt's home is a single level w/ approx 3 steps then 5 steps to enter.  Pt has a walker at home but typically does not use for ambulation.    Pt no longer drives. Pt's son confirmed family assist w/ transportation needs.    Discussed anticipated therapy need of HH. Confirmed hx w/ [facility]. Pt's son confirmed agreeable and would like to use [facility] again for services at [place].    [facility] reserved in [place]. HH instructions added to pt's AVS.    PLAN: Home w/ [facility] - pending cardiology      SW/CM to remain available for support and/or discharge planning.       [place], MSW, LSW [phone]

## 2024-12-23 NOTE — DISCHARGE SUMMARY
[facility]  part of [facility]    Discharge Summary    Leonard Salcido Patient Status:  Observation    3/20/1935 MRN [id number]   Location [facility] 3W/SW Attending Ritesh Lazcano MD   Hosp Day # 0 PCP Ayden Samano MD     Date of Admission: 2024  Disposition:   Home or Self Care     Date of Discharge:  2024     Admitting Diagnosis:   Syncope, unspecified syncope type [R55]    Hospital Discharge Diagnoses:    Syncope  Likely mild dehydration  Mild hypernatremia -  Cr above baseline   Hyperkalemia   DM 2  History of prostate cancer  HTN  HLD  Dementia       Problem List:     Patient Active Problem List   Diagnosis    Hypertension, benign    Degeneration of lumbar or lumbosacral intervertebral disc    Benign prostatic hyperplasia without lower urinary tract symptoms    Type II diabetes mellitus (HCC)    Hyperlipidemia    Postlaminectomy syndrome, lumbar region    Hereditary and idiopathic peripheral neuropathy    Prostate cancer (HCC)    Dementia without behavioral disturbance, psychotic disturbance, mood disturbance, or anxiety (HCC)    CKD (chronic kidney disease) stage 3, GFR 30-59 ml/min (HCC)    Syncope    Hypernatremia    Syncope, unspecified syncope type       Physical Exam:      /82 (BP Location: Right arm)   Pulse 72   Temp 97.9 °F (36.6 °C) (Oral)   Resp 18   Wt 168 lb 12.8 oz (76.6 kg)   SpO2 99%   BMI 24.93 kg/m²     Gen:  NAD.  A and O x  3  CV:   RRR.  No m/g/r  Pulm:   CTA bilat  Abd:   +bs, soft, NT, ND  LE:  No c/c/e  Neuro:  nonfocal      Reason for Admission:   Witnessed syncope        Subjective:  Leonard Salcido is a 89 year old male with history of anemia, dementia, DM, HLD, HTN, prostate CA who presented to the ED for evaluation of a syncopal episode.  This was witnessed by son.  Patient apparently ate dinner, walked into the bedroom, came back out and sat down at the kitchen table.  Started shivering and eyes started to flutter,  teary.  He became less responsive, almost sliding off his chair.  Was out for a few minutes per son.  Prior to that, he was at baseline health with no complaints.  At this time he denies any complaints.  Per son patient has reported poor sleep in the last few days.  Son also notes patient has missed follow-up with oncology.     At presentation, vital stable.  Labs with sodium 146, potassium 5.3, otherwise stable  EKG with sinus rhythm with first-degree AV block, LAD, LBBB.  Unchanged from prior  CXR: Mild linear opacity left lung base likely atelectasis or scarring.  Otherwise no acute cardiopulmonary disease.      Chart reviewed: Had similar episode in June 2024 while at the [facility].  Workup was essentially negative except for acute kidney injury.  Echocardiogram showed EF 50 to 55%.  Unable to assess diastolic function.  Hospitalized in August 2024 for chest pain.  Had cardiac workup including stress test that was negative.  Followed by cardiology.    Hospital Course:     Syncope  Likely was due to mild dehydration  - cardiology was on consult  - labs ok  - echo with EF 55-60%, no diastolic dysfunction   - was hydrated with IVF  - eating well  MCT as outpt.   Cards and PCP f/u.     Mild hypernatremia - resolved  Cr above baseline - improved  Hyperkalemia - resolved  - was given IVF     DM 2  Resume home meds.     History of prostate cancer  HTN  HLD  Dementia     dvt proph:    heparin       Code status:    Full       Consultations:   Cardiology     Discharge Condition:  Good    Discharge Medications:      Discharge Medications        CHANGE how you take these medications        Instructions Prescription details   Lovastatin 20 MG Tabs  What changed: how much to take      Take 2 tablets (40 mg total) by mouth nightly.   Quantity: 90 tablet  Refills: 3            CONTINUE taking these medications        Instructions Prescription details   amLODIPine 5 MG Tabs  Commonly known as: Norvasc      Take 1 tablet (5 mg  total) by mouth daily.   Quantity: 90 tablet  Refills: 3     donepezil 5 MG Tabs  Commonly known as: Aricept      Take 1 tablet (5 mg total) by mouth nightly.   Quantity: 90 tablet  Refills: 1     Ferrous Sulfate 325 (65 Fe) MG Tabs      Take 1 tablet (325 mg total) by mouth daily with breakfast.   Quantity: 90 tablet  Refills: 1     lisinopril-hydroCHLOROthiazide 20-12.5 MG Tabs  Commonly known as: Zestoretic      Take 2 tablets by mouth daily.   Quantity: 180 tablet  Refills: 3     metFORMIN  MG Tb24  Commonly known as: Glucophage XR      Take 1 tablet (500 mg total) by mouth daily.   Quantity: 90 tablet  Refills: 0     Mirabegron ER 50 MG Tb24  Commonly known as: Myrbetriq      Take 1 tablet (50 mg total) by mouth daily.   Quantity: 30 tablet  Refills: 0     pantoprazole 40 MG Tbec  Commonly known as: Protonix      Take 1 tablet (40 mg total) by mouth before breakfast.   Quantity: 90 tablet  Refills: 0     tamsulosin 0.4 MG Caps  Commonly known as: Flomax      Take 1 capsule (0.4 mg total) by mouth daily.   Quantity: 90 capsule  Refills: 1               Where to Get Your Medications        These medications were sent to [id number] - [address], [phone], [phone]  [address] [place] [id number]      Phone: [phone]   Lovastatin 20 MG Tabs         Follow up Visits:     Follow-up Information       Tosha Cabrera, DO Follow up in 3 week(s).    Specialty: Interventional, Cardiology  Why: Office will call you for follow up appt  Contact information:  [address]  [address]  [facility] [address] [phone]               Ayden Samano MD. Schedule an appointment as soon as possible for a visit in 1 week(s).    Specialty: Internal Medicine  Contact information:  [address]  [facility] [facility] [address] [phone]                             Hospital Discharge Diagnoses:  Syncope    Lace+ Score: 69  59-90  High Risk  29-58 Medium Risk  0-28   Low Risk.    TCM Follow-Up Recommendation:  LACE > 58: High Risk of readmission after discharge from the hospital.    >35 minutes spent preparing this discharge.    Ritesh Tan MD  12/23/2024  2:17 PM

## 2024-12-23 NOTE — PROGRESS NOTES
AVS and discharge instructions reviewed with Ismael vu. IV and tele discontinued.  Discharged to home.

## 2024-12-23 NOTE — PROGRESS NOTES
Progress Note  Leonard Salcido Patient Status:  Observation    3/20/1935 MRN [id number]   Location [facility] 3W/SW Attending Ritesh Lazcano MD   Hosp Day # 0 PCP Ayden Samano MD     Subjective:  No acute events overnight.  Resting in bed this morning, on room air, denies any dizziness, lightheadedness or any other cardiac symptoms currently.  Feels tired and reports having headache.    Objective:  /82 (BP Location: Right arm)   Pulse 72   Temp 97.9 °F (36.6 °C) (Oral)   Resp 18   Wt 168 lb 12.8 oz (76.6 kg)   SpO2 99%   BMI 24.93 kg/m²     Telemetry: SR, 1st degree AVB, LBBB      Intake/Output:    Intake/Output Summary (Last 24 hours) at 2024 1016  Last data filed at 2024 0900  Gross per 24 hour   Intake 370 ml   Output 751 ml   Net -381 ml       Last 3 Weights   24 0615 168 lb 12.8 oz (76.6 kg)   24 2117 166 lb 8 oz (75.5 kg)   24 1208 151 lb (68.5 kg)   24 0600 155 lb (70.3 kg)   24 0549 157 lb 4.8 oz (71.4 kg)   24 0514 155 lb (70.3 kg)       Labs:  Recent Labs   Lab 24  1730 24  0733   * 134*   BUN 27* 24*   CREATSERUM 1.17 1.00   EGFRCR 60 72   CA 10.2 9.8   * 145   K 5.3* 4.3    110   CO2 32.0 29.0     Recent Labs   Lab 24  1730   RBC 3.84   HGB 10.7*   HCT 32.5*   MCV 84.6   MCH 27.9   MCHC 32.9   RDW 14.3   NEPRELIM 2.56   WBC 4.6   .0         Recent Labs   Lab 24  1730   TROPHS 55*       Review of Systems   Constitutional: Positive for malaise/fatigue.   Cardiovascular: Negative.    Respiratory: Negative.         Physical Exam:    Gen: alert, oriented x 3, NAD  Heent: pupils equal, reactive. Mucous membranes moist.   Neck: no jvd  Cardiac: regular rate and rhythm, normal S1,S2, no murmur, gallop or rub   Lungs: CTA  Abd: soft, NT/ND +bs  Ext: no edema  Skin: Warm, dry  Neuro: No focal deficits      Medications:     heparin  5,000 Units Subcutaneous Q8H VALENTINO    insulin aspart  1-5  Units Subcutaneous TID CC and HS    amLODIPine  5 mg Oral Daily    donepezil  5 mg Oral Nightly    ferrous sulfate  325 mg Oral Daily with breakfast    pravastatin  20 mg Oral Nightly    pantoprazole  40 mg Oral Before breakfast    tamsulosin  0.4 mg Oral Daily    [Held by provider] Mirabegron ER  50 mg Oral Daily    lisinopril  40 mg Oral Daily    And    hydrochlorothiazide  25 mg Oral Daily       Assessment:  Syncope    CT head neg for any acute intracranial findings   EKG, SR 1st degree AVB, LBBB  Trop 55  Echo 12/22/24 LVEF 55-60%, no rwma, mild AI   Orthostatic vitals neg  Mild troponin elevation-appears chronic   Trop 55   Nuc stress test 8/2024 neg for ischemia, EF 60%  Underlying conduction abnormalities with 1 st degree AVB and LBBB-plan for op MCT, avoid AVN blockade agents     HTN-controlled  HLP-, on statin  DM2, A1C 6.6%  Dementia  Prostate CA    Plan:  Denies any cardiac symptoms.  Orthostatic vitals neg.  Echo results noted, preserved LVEF 55-60%, no rwma.  No significant arrhythmias per tele review.  Encourage adequate hydration, avoid any AVN blocking agents.   Discharge planing in progress, ok with cardiology-will schedule op MCT to r/o any arrhythmias or pauses.     Plan of care discussed with patient, RN.    PRASHANTH Feliz  12/23/2024  10:16 AM  [phone]

## 2024-12-24 ENCOUNTER — TELEPHONE (OUTPATIENT)
Dept: INTERNAL MEDICINE CLINIC | Facility: CLINIC | Age: 89
End: 2024-12-24

## 2024-12-24 DIAGNOSIS — E11.9 TYPE 2 DIABETES MELLITUS WITHOUT COMPLICATION, WITHOUT LONG-TERM CURRENT USE OF INSULIN (HCC): ICD-10-CM

## 2024-12-24 NOTE — TELEPHONE ENCOUNTER
Mirabegron ER (MYRBETRIQ) 50 MG Oral Tablet 24 Hr, Take 1 tablet (50 mg total) by mouth daily., Disp: 30 tablet, Rfl: 0      metFORMIN  MG Oral Tablet 24 Hr, Take 1 tablet (500 mg total) by mouth daily., Disp: 90 tablet, Rfl: 0      pantoprazole 40 MG Oral Tab EC, Take 1 tablet (40 mg total) by mouth before breakfast., Disp: 90 tablet, Rfl: 0

## 2024-12-24 NOTE — TELEPHONE ENCOUNTER
Jeanette from [facility] health calling to state patient will be starting home health, asking if Dr. Samano will sign orders.

## 2024-12-27 ENCOUNTER — PATIENT OUTREACH (OUTPATIENT)
Dept: CASE MANAGEMENT | Age: 89
End: 2024-12-27

## 2024-12-27 NOTE — PROGRESS NOTES
TCM Request    Hospital Follow up for PCP (Discharge 12/23 elm )     Ayden Santoyo [facility] [address]  [place] [address]  [phone]  Called out to pt's son left vm     Attempt #1:  Left message on voicemail for patient to call transitions specialist back to schedule follow up appointments. Provided Transitions specialist scheduling phone number [phone].

## 2024-12-29 RX ORDER — DONEPEZIL HYDROCHLORIDE 5 MG/1
5 TABLET, FILM COATED ORAL NIGHTLY
Qty: 90 TABLET | Refills: 3 | Status: SHIPPED | OUTPATIENT
Start: 2024-12-29

## 2024-12-29 NOTE — TELEPHONE ENCOUNTER
Protocol Failed/ No Protocol    Requested Prescriptions   Pending Prescriptions Disp Refills    DONEPEZIL 5 MG Oral Tab [Pharmacy Med Name: DONEPEZIL 5MG TABLETS] 90 tablet 1     Sig: TAKE 1 TABLET(5 MG) BY MOUTH EVERY NIGHT       There is no refill protocol information for this order          Future Appointments         Provider Department Appt Notes    In 2 days CMA RESOURCE Linette AREVALO [facility] [place] FT-PSA-    In 2 days Viktor Bloom MD Nancy W. [facility] [place] overdue 6 m f/u.md          Recent Outpatient Visits              4 months ago Atypical chest pain    [facility] Ayden Samano MD    Office Visit    7 months ago Mild dementia without behavioral disturbance, psychotic disturbance, mood disturbance, or anxiety, unspecified dementia type (HCC)    [facility] [place] Ayden Samano MD    Office Visit    9 months ago Hypertension, benign    [facility] Marline Saldivar APRN    Office Visit    9 months ago Normocytic anemia    [facility] Viktor Bloom MD    Office Visit    9 months ago Prostate cancer (HCC)    [facility]    Nurse Only

## 2024-12-30 ENCOUNTER — OFFICE VISIT (OUTPATIENT)
Age: 89
End: 2024-12-30
Attending: INTERNAL MEDICINE
Payer: MEDICARE

## 2024-12-30 ENCOUNTER — NURSE ONLY (OUTPATIENT)
Age: 89
End: 2024-12-30
Attending: INTERNAL MEDICINE
Payer: MEDICARE

## 2024-12-30 VITALS
RESPIRATION RATE: 16 BRPM | WEIGHT: 165.38 LBS | DIASTOLIC BLOOD PRESSURE: 68 MMHG | HEART RATE: 58 BPM | BODY MASS INDEX: 25.07 KG/M2 | HEIGHT: 68 IN | OXYGEN SATURATION: 99 % | SYSTOLIC BLOOD PRESSURE: 123 MMHG | TEMPERATURE: 98 F

## 2024-12-30 DIAGNOSIS — D64.9 NORMOCYTIC ANEMIA: Primary | ICD-10-CM

## 2024-12-30 DIAGNOSIS — Z85.46 ENCOUNTER FOR FOLLOW-UP SURVEILLANCE OF PROSTATE CANCER: ICD-10-CM

## 2024-12-30 DIAGNOSIS — Z08 ENCOUNTER FOR FOLLOW-UP SURVEILLANCE OF PROSTATE CANCER: ICD-10-CM

## 2024-12-30 DIAGNOSIS — D64.9 NORMOCYTIC ANEMIA: ICD-10-CM

## 2024-12-30 LAB
DEPRECATED HBV CORE AB SER IA-ACNC: 49 NG/ML
IRON SATN MFR SERPL: 22 %
IRON SERPL-MCNC: 74 UG/DL
PSA SERPL-MCNC: <0.04 NG/ML (ref ?–4)
TIBC SERPL-MCNC: 332 UG/DL (ref 250–425)
TRANSFERRIN SERPL-MCNC: 223 MG/DL (ref 215–365)

## 2024-12-30 RX ORDER — MIRABEGRON 50 MG/1
50 TABLET, FILM COATED, EXTENDED RELEASE ORAL DAILY
Qty: 30 TABLET | Refills: 3 | Status: SHIPPED | OUTPATIENT
Start: 2024-12-30

## 2024-12-30 RX ORDER — PANTOPRAZOLE SODIUM 40 MG/1
40 TABLET, DELAYED RELEASE ORAL
Qty: 90 TABLET | Refills: 3 | Status: SHIPPED | OUTPATIENT
Start: 2024-12-30

## 2024-12-30 RX ORDER — METFORMIN HYDROCHLORIDE 500 MG/1
500 TABLET, EXTENDED RELEASE ORAL DAILY
Qty: 90 TABLET | Refills: 0 | Status: SHIPPED | OUTPATIENT
Start: 2024-12-30

## 2024-12-30 NOTE — PROGRESS NOTES
[facility] Progress Note    Patient Name: Leonard Salcido   YOB: 1935   Medical Record Number: [id number]   CSN: [id number]   Consulting Physician: Viktor Bloom MD  Referring Physician(s): No ref. provider found  Date of Visit: 12/30/2024     Chief Complaint:  High Risk Disease prostate cancer; grade group 5; adenocarcinoma, [place] 4+5=9,[id number], stage 1    History of Present Illness:     Leonard Salcido is a 89 year old male that was seen today in the Cancer Center for evaluation of the above condition. Patient has PMH relevant for chronic low back pain in the lumbar spine region, diabetes on metformin who presents following prostate cancer diagnosis. Patient was diagnosed on 12/13/2021 with [place] score 4+5=9; grade group 5, very high risk disease. His PSA of 15.7 ng/mL on 11/1/2021. Patient has been seen and evaluated by urology and radiation oncology and planned for EBRT with ADT therapy. Patient was given his first dose of ADT via Eligard (Leuprolide) on 1/7/2022 at 45 mg and planned for 6 month dosing.  Patient reports that following the injection he developed likely a small hematoma beneath the skin which was stable for about 5 to 6 days after receiving his ADT therapy. Patient mentions that following CT simulation he was laying in an unusual position and then felt his injection site in the right lower abdominal region was more irritated. \"Alyssia\" then went home and placed ice and a cream on the site and it caused skin blistering and further irritation.  Today, the skin has improved with no worsening skin lesions. His skin reaction was not associated with any itching, dyspnea, or anaphylactic reaction features.  Patient has completed bone imaging study on 12/27 showing no evidence of distant disease involvement as well as CT abdomen pelvis imaging on 12/31 showing no evidence of local regional extension of prostate cancer with regards to pelvic lymphadenopathy. Alyssia lives independently  with an excellent performance status and denies any systemic signs of illness as he reports he has been working on weight loss with regards to his diabetes. He denies any new back pain or bone pain or any other concerns on ROS.    His surveillance CT chest imaging showing no evidence of metastatic disease. He has completed EBRT on 3/24/2022.    Interval History:  Patient presents for surveillance of his high risk prostate cancer s/p planned ADT treatment course. His PSA is undetectable. The patient's dementia features have worsened. He has been hospitalized a few times, most recently with syncope with plans for cardiac monitoring pending.  His weight is stable; previously mentioned that his appetite tends to fluctuate. Patient denies any blood loss, reports eating meat,denies any new concerns on ROS.     Past Medical History:  Past Medical History:    Allergic rhinitis    Anemia    Anemia due to blood loss    Aspirin long-term use    Cancer (HCC)    Chronic low back pain    lumbar spine; now s/p lumbar fusion    Dementia (HCC)    Diabetes (HCC)    Diabetes mellitus, type 2 (pick complications) (HCC)    History of diverticulosis    Lipid screening    Other and unspecified hyperlipidemia    Other male erectile dysfunction    Rectal bleeding    hemorrhoidal bleeding    Rotator cuff tear, left    Unspecified essential hypertension       Past Surgical History:  Past Surgical History:   Procedure Laterality Date    Appendectomy      Colonoscopy N/A 5/4/2021    Procedure: COLONOSCOPY;  Surgeon: Ulysses Harley MD;  Location: [facility] ENDOSCOPY    Laminectomy  1987    Lumbar spine fusion combined  8-8-13    Repair rotator cuff,acute Left 1993    Tonsillectomy         Family Medical History:  Family History   Problem Relation Age of Onset    Heart Disease Father 70        CAD    Cancer Brother 83    Prostate Cancer Son 57    Cancer Brother 81        portion of bowel    Breast Cancer Neg     Pancreatic Cancer Neg     Ovarian  Cancer Neg     Colon Cancer Neg          Social History:  Social History     Socioeconomic History    Marital status:      Spouse name: Not on file    Number of children: 4    Years of education: Not on file    Highest education level: Not on file   Occupational History    Not on file   Tobacco Use    Smoking status: Former     Current packs/day: 0.00     Average packs/day: 1 pack/day for 20.0 years (20.0 ttl pk-yrs)     Types: Cigarettes     Start date: 1952     Quit date: 1972     Years since quittin.0    Smokeless tobacco: Never   Vaping Use    Vaping status: Never Used   Substance and Sexual Activity    Alcohol use: No     Comment: no alcohol use since     Drug use: No    Sexual activity: Not on file   Other Topics Concern     Service Not Asked    Blood Transfusions Not Asked    Caffeine Concern Yes    Occupational Exposure Not Asked    Hobby Hazards Not Asked    Sleep Concern Not Asked    Stress Concern Not Asked    Weight Concern Not Asked    Special Diet Not Asked    Back Care Not Asked    Exercise Not Asked    Bike Helmet Not Asked    Seat Belt Not Asked    Self-Exams Not Asked   Social History Narrative    Alyssia is . Father of 5 adult children, multiple grandchildren and great grandchildren. Patient is retired from being a  for the [employer]. Patient lives alone in [place], [place]. Patient enjoys time with his dogs, fans of the Cubs, and basketball.      Social Drivers of Health     Financial Resource Strain: Low Risk  (2024)    Financial Resource Strain     Difficulty of Paying Living Expenses: Not on file     Med Affordability: No   Food Insecurity: No Food Insecurity (2024)    Food Insecurity     Food Insecurity: Never true   Transportation Needs: No Transportation Needs (2024)    Transportation Needs     Lack of Transportation: No     Car Seat: Not on file   Physical Activity: Not on file   Stress: Not on file   Social Connections: Not on  file   Housing Stability: Low Risk  (12/22/2024)    Housing Stability     Housing Instability: No     Housing Instability Emergency: Not on file     Crib or Bassinette: Not on file       Allergies:   Allergies   Allergen Reactions    Eligard [Leuprolide] RASH     Rash with large sized blisters       Current Medications:  No outpatient medications have been marked as taking for the 12/30/24 encounter (Office Visit) with Viktor Bloom MD.      leuprolide (ELIGARD) depot injection 45 mg  45 mg Subcutaneous Q6 Months Alicia Bañuelos MD   45 mg at 01/07/22 1638       Review of Systems:    Constitutional: Negative for anorexia, chills, fatigue, fevers, night sweats and weight loss.  Eyes: Negative for visual disturbance, irritation and redness.  Respiratory: Negative for cough, hemoptysis, chest pain, or dyspnea on exertion.  Gastrointestinal: Negative for dysphagia, odynophagia, reflux symptoms, nausea, vomiting, change in bowel habits, diarrhea, constipation and abdominal pain.  Integument/breast: Negative for new skin lesions.  Hematologic/lymphatic: Negative for easy bruising, bleeding, and lymphadenopathy.  Musculoskeletal: +chronic low back pain; not endorsed today  Neurological: Negative for headaches, dizziness, speech problems, gait problems and weakness.    A comprehensive 14 point review of systems was completed.  Pertinent positives and negatives noted in the the HPI.     Vital Signs:  /68 (BP Location: Left arm, Patient Position: Sitting, Cuff Size: adult)   Pulse 58   Temp 98 °F (36.7 °C) (Oral)   Resp 16   Ht 1.727 m (5' 8\")   Wt 75 kg (165 lb 6.4 oz)   SpO2 99%   BMI 25.15 kg/m²     Physical Examination:    General: Patient is alert and oriented x 3, not in acute distress.  Psych: Friendly, cooperative with appropriate questions and responses  HEENT: EOMs intact. Oropharynx is clear.   Neck: No palpable lymphadenopathy. Neck is supple.  Lymphatics: There is no palpable lymphadenopathy  throughout in the cervical, supraclavicular, axillary, or inguinal regions.  Chest: Clear to auscultation. No wheezes or rales.  Heart: Regular rate and rhythm. S1S2 normal.  Abdomen: Soft, non tender with good bowel sounds.  No hepatosplenomegaly.  No palpable mass.  Extremities: No edema or calf tenderness.  Neurological: Grossly intact.     Performance Status:    ECOG 0: Fully active, able to carry on all pre-disease performance without restriction      Labs:    Lab Results   Component Value Date/Time    WBC 4.6 12/21/2024 05:30 PM    RBC 3.84 12/21/2024 05:30 PM    HGB 10.7 (L) 12/21/2024 05:30 PM    HCT 32.5 (L) 12/21/2024 05:30 PM    MCV 84.6 12/21/2024 05:30 PM    MCH 27.9 12/21/2024 05:30 PM    MCHC 32.9 12/21/2024 05:30 PM    RDW 14.3 12/21/2024 05:30 PM    NEPRELIM 2.56 12/21/2024 05:30 PM    .0 12/21/2024 05:30 PM       Lab Results   Component Value Date/Time     (H) 12/22/2024 07:33 AM    BUN 24 (H) 12/22/2024 07:33 AM    CREATSERUM 1.00 12/22/2024 07:33 AM    GFRNAA 62 06/24/2022 06:53 AM    CA 9.8 12/22/2024 07:33 AM    ALB 4.2 12/21/2024 05:30 PM     12/22/2024 07:33 AM    K 4.3 12/22/2024 07:33 AM     12/22/2024 07:33 AM    CO2 29.0 12/22/2024 07:33 AM    ALKPHO 77 12/21/2024 05:30 PM    AST 22 12/21/2024 05:30 PM    ALT 14 12/21/2024 05:30 PM       Recent Results (from the past 176200 hours)   PSA - DIAGNOSTIC [E]    Collection Time: 12/30/24  3:00 PM   Result Value Ref Range    Total PSA  <0.04 <=4.00 ng/mL     *Note: Due to a large number of results and/or encounters for the requested time period, some results have not been displayed. A complete set of results can be found in Results Review.       Imaging:    N/A        Impression:      ICD-10-CM    1. Normocytic anemia  D64.9 Ferritin     Iron And Tibc            89 year old M with PMH elevant for chronic low back pain in the lumbar spine region, diabetes on metformin who presents following prostate cancer  diagnosis.    Plan:    1.) Prostate Cancer--High Risk disease    --[place] score 4+5=9; grade group 5, high risk disease. His PSA of 15.7 ng/mL on 11/1/2021  --I have discussed with the pt and his son, Maxwell, his diagnosis, his stage of disease, his prostate cancer risk categorization and why the standard of care would be EBRT+(ADT for 2 yrs) is definitive therapy for his prostate cancer  --discussed pt's CT chest imaging with him and his son, Sudheer, showing NO signs of distant metastatic disease  --his PSA was already lowered s/p 1st dose of ADT in 1/2022; then down to 3.07, his testosterone levels are suppressed s/p first dose of ADT injection  --pt had a skin reaction in relation to Eliguard. He was given the 45 mg every 6 month dosing  --next dosing of ADT would be 7/7/2022.   --We will plan Trelstar 11.25 mg every 3 months would be a nice alternative for him as he needs treatment with ADT for 2 yrs    --Called the patient and explained miscalculation of his risk profile as he only has high risk disease and does not need to take the extra step of taking abiraterone+prednisone. Patient understood these instructions, has not suffered any side effects from oral ADT and thanked me for the call. Explained the situation to his two sons who were appreciative of the call and information.     --pt received Trelstar ADT in 10/2022; tolerated this form of ADT well without any skin reaction, lesions or injections    --continued Trelstar ADT 11.25 mg every 3 months to treat his prostate cancer until 7/2023    --we reviewed that we will plan only 18 months of ADT (completion of therapy 7/18/2023) as he has some worsening memory issues which may be from advancing age, but can be worsened by ADT    --his PSA was undetectable at <0.01 ng/mL in July, 2023    --PSA is still undetectable at <0.04; memory impairment is still present despite being off ADT, seems to have worsening dementia    --low suspicion for progression of disease  in light of his prostate cancer history  --repeat eval in 6 months for follow up and repeat PSA check    --Discussed the side effect profile of this form of Androgen Deprivation Therapy.   --He is tolerating this form of ADT better than Lupron in terms of not causing any skin rash/blisters      2.) Chronic Low Back Pain    --Noted condition present for many years with lumbar sacral spinal fusion, unrelated to prostate cancer involvement in the bone    3.) Normocytic Anemia    --present since 2011, suspect anemia of chronic disease in light of HTN and likely DM  --rechecking iron studies  --denies blood loss from any orifice    MDM: Moderate Risk    Viktor Bloom MD  [facility]  [address]. [facility], [place] [address]

## 2024-12-30 NOTE — PROGRESS NOTES
TCM Request    Hospital Follow up for PCP (Discharge 12/23 elm )     [address]  [facility] [address]  [place], [place] [id number] [phone]  Called out to pt's son phone   Appt made for 1/3/2025@10:40    Confirmed with pt's son   Closing encounter

## 2024-12-30 NOTE — TELEPHONE ENCOUNTER
Refill passed per [facility] protocol.    Requested Prescriptions   Pending Prescriptions Disp Refills    Mirabegron ER (MYRBETRIQ) 50 MG Oral Tablet 24 Hr 30 tablet 0     Sig: Take 1 tablet (50 mg total) by mouth daily.       Genitourinary Medications Passed - 12/30/2024 10:47 AM        Passed - Patient does not have pulmonary hypertension on problem list        Passed - In person appointment or virtual visit in the past 12 mos or appointment in next 3 mos     Recent Outpatient Visits              4 months ago Atypical chest pain    [facility], Ayden Mosquera MD    Office Visit    7 months ago Mild dementia without behavioral disturbance, psychotic disturbance, mood disturbance, or anxiety, unspecified dementia type (HCC)    [facility] Ayden Mosquera MD    Office Visit    9 months ago Hypertension, benign    [facility] Marline Saldivar APRN    Office Visit    9 months ago Normocytic anemia    [facility] Viktor Bloom MD    Office Visit    9 months ago Prostate cancer (HCC)    [facility] Hematology Oncology    Nurse Only          Future Appointments         Provider Department Appt Notes    Today CMA RESOURCE Linette AREAVLO Pending sale to [facility] [place] FT-PSA-PIV.md    Today Viktor Bloom MD [facility] [place] overdue 6 m f/u.md    In 4 days Marline Saldivar APRN [facility] HFU                      metFORMIN  MG Oral Tablet 24 Hr 90 tablet 0     Sig: Take 1 tablet (500 mg total) by mouth daily.       Diabetes Medication Protocol Passed - 12/30/2024 10:47 AM        Passed - Last A1C < 7.5 and within past 6 months     Lab Results   Component Value Date    A1C 7.1 (H)  12/21/2024             Passed - In person appointment or virtual visit in the past 6 mos or appointment in next 3 mos     Recent Outpatient Visits              4 months ago Atypical chest pain    [facility] Ayden Samano MD    Office Visit    7 months ago Mild dementia without behavioral disturbance, psychotic disturbance, mood disturbance, or anxiety, unspecified dementia type (HCC)    [facility] Ayden Samano MD    Office Visit    9 months ago Hypertension, benign    [facility] Marline Saldivar APRN    Office Visit    9 months ago Normocytic anemia    [facility]emat[facility] Oncology Viktor Bloom MD    Office Visit    9 months ago Prostate cancer (HCC)    [facility]emat[facility] On[facility]    Nurse Only          Future Appointments         Provider Department Appt Notes    Today CMA RESOURCE Linette AREVALO [facility] [place] FT-PSA-PIV.md    [facility] [place] overdue 6 m f/u.md    In 4 days Marline Saldivar APRN [facility] HFU                    Passed - Microalbumin procedure in past 12 months or taking ACE/ARB        Passed - EGFRCR or GFRAA > 50     GFR Evaluation  EGFRCR: 72 , resulted on 12/22/2024          Passed - GFR in the past 12 months          pantoprazole 40 MG Oral Tab EC 90 tablet 0     Sig: Take 1 tablet (40 mg total) by mouth before breakfast.       Gastrointestional Medication Protocol Passed - 12/30/2024 10:47 AM        Passed - In person appointment or virtual visit in the past 12 mos or appointment in next 3 mos     Recent Outpatient Visits              4 months ago [facility]typical chest pain    [facility]    [facility] Visit    7 months ago Mild dementia without behavioral disturbance, psychotic disturbance, mood disturbance, or anxiety, unspecified dementia type (HCC)    [place] [place] Ayden Samano MD    Office Visit    9 months ago Hypertension, benign    [place], [facility] Marline Saldivar APRN    Office Visit    9 months ago Normocytic anemia    [facility] Viktor Bloom MD    Office Visit    9 months ago Prostate cancer (HCC)    [facility]    Nurse Only          Future Appointments         Provider Department Appt Notes    Today CMA RESOURCE Linette Ana [facility] On[facility] [place] FT-PSA-PIV.md    Today Viktor Bloom MD [place]. [facility] [place] overdue 6 m f/u.md    In 4 days Marline Saldivar APRN [facility] HF                       Future Appointments         Provider Department Appt Notes    Today [facility] RESOURCE Linette W. [facility] [place] FT-PSA-PIV.md    Today Viktor Bloom MD [place] [facility]ology On[facility] [place] overdue 6 m f/u.md    In 4 days Marline Saldivar APRN [facility] HF          Recent Outpatient Visits              4 months ago Atypical chest pain    [facility] Ayden Samano MD    Office Visit    7 months ago Mild dementia without behavioral disturbance, psychotic disturbance, mood disturbance, or anxiety, unspecified dementia type (HCC)    [facility] Ayden Samano MD    Office Visit    9 months ago Hypertension, [facility]  Marline Saldivar, PRASHANTH    Office Visit    9 months ago Normocytic anemia    [facility] On[facility] Viktor Bloom MD    Office Visit    9 months ago Prostate cancer (HCC)    [facility] On[facility]    Nurse Only

## 2024-12-31 ENCOUNTER — TELEPHONE (OUTPATIENT)
Age: 89
End: 2024-12-31

## 2024-12-31 DIAGNOSIS — D64.9 NORMOCYTIC ANEMIA: Primary | ICD-10-CM

## 2025-01-16 ENCOUNTER — TELEPHONE (OUTPATIENT)
Dept: INTERNAL MEDICINE CLINIC | Facility: CLINIC | Age: 89
End: 2025-01-16

## 2025-01-16 DIAGNOSIS — N18.30 STAGE 3 CHRONIC KIDNEY DISEASE, UNSPECIFIED WHETHER STAGE 3A OR 3B CKD (HCC): Primary | Chronic | ICD-10-CM

## 2025-01-16 DIAGNOSIS — E11.69 TYPE 2 DIABETES MELLITUS WITH OTHER SPECIFIED COMPLICATION, WITHOUT LONG-TERM CURRENT USE OF INSULIN (HCC): ICD-10-CM

## 2025-01-17 RX ORDER — BLOOD SUGAR DIAGNOSTIC
1 STRIP MISCELLANEOUS DAILY
Qty: 100 STRIP | Refills: 5 | Status: SHIPPED | OUTPATIENT
Start: 2025-01-17

## 2025-01-17 NOTE — TELEPHONE ENCOUNTER
Dr. Samano,    Diabetic monitoring kit and supplies order is pended below.    Please review and sign if appropriate.  Order will go to pharmacy.    Left voicemail for Alicia ([phone])  in home health that the supplies will be sent to the patient's local pharmacy.     Thank you,  Celestina FREEMAN CMA

## 2025-01-20 ENCOUNTER — MED REC SCAN ONLY (OUTPATIENT)
Dept: INTERNAL MEDICINE CLINIC | Facility: CLINIC | Age: 89
End: 2025-01-20

## 2025-02-12 ENCOUNTER — HOME HEALTH CHARGES (OUTPATIENT)
Dept: INTERNAL MEDICINE CLINIC | Facility: CLINIC | Age: 89
End: 2025-02-12

## 2025-02-12 ENCOUNTER — MED REC SCAN ONLY (OUTPATIENT)
Dept: INTERNAL MEDICINE CLINIC | Facility: CLINIC | Age: 89
End: 2025-02-12

## 2025-02-12 DIAGNOSIS — F03.90 DEMENTIA WITHOUT BEHAVIORAL DISTURBANCE, PSYCHOTIC DISTURBANCE, MOOD DISTURBANCE, OR ANXIETY, UNSPECIFIED DEMENTIA SEVERITY, UNSPECIFIED DEMENTIA TYPE (HCC): ICD-10-CM

## 2025-02-12 DIAGNOSIS — Z91.81 HISTORY OF FALLING: ICD-10-CM

## 2025-02-12 DIAGNOSIS — R26.9 UNSPECIFIED ABNORMALITIES OF GAIT AND MOBILITY: ICD-10-CM

## 2025-02-12 DIAGNOSIS — E11.9 TYPE 2 DIABETES MELLITUS WITHOUT COMPLICATION, UNSPECIFIED WHETHER LONG TERM INSULIN USE (HCC): Primary | ICD-10-CM

## 2025-02-12 NOTE — PROGRESS NOTES
This is the initial home health certification for [facility]  SOC: 1/11/2025  Dates of service are for 1/11/2025 to 3/11/2025     See scanned reports for plan of care     Order# [id number]

## 2025-02-12 NOTE — PROGRESS NOTES
This is the initial home health certification for [facility]  SOC: 1/11/2025  Dates of service are for 1/11/2025 to 3/11/2025    See scanned reports for plan of care     Order# [id number]

## 2025-02-25 ENCOUNTER — MED REC SCAN ONLY (OUTPATIENT)
Dept: INTERNAL MEDICINE CLINIC | Facility: CLINIC | Age: 89
End: 2025-02-25

## 2025-02-25 ENCOUNTER — TELEPHONE (OUTPATIENT)
Dept: INTERNAL MEDICINE CLINIC | Facility: CLINIC | Age: 89
End: 2025-02-25

## 2025-02-25 NOTE — TELEPHONE ENCOUNTER
On site staff, have you received this form?    Maida from [facility] [place] she faxed a form for  to sign. Just as tenzin the first form received page 1 there was a crossed off diagnosis code

## 2025-02-27 ENCOUNTER — HOME HEALTH CHARGES (OUTPATIENT)
Dept: INTERNAL MEDICINE CLINIC | Facility: CLINIC | Age: 89
End: 2025-02-27

## 2025-02-27 ENCOUNTER — APPOINTMENT (OUTPATIENT)
Age: 89
End: 2025-02-27
Attending: INTERNAL MEDICINE
Payer: MEDICARE

## 2025-02-27 DIAGNOSIS — F03.90 DEMENTIA WITHOUT BEHAVIORAL DISTURBANCE, PSYCHOTIC DISTURBANCE, MOOD DISTURBANCE, OR ANXIETY, UNSPECIFIED DEMENTIA SEVERITY, UNSPECIFIED DEMENTIA TYPE (HCC): ICD-10-CM

## 2025-02-27 DIAGNOSIS — Z91.81 HISTORY OF FALLING: ICD-10-CM

## 2025-02-27 DIAGNOSIS — E11.9 TYPE 2 DIABETES MELLITUS WITHOUT COMPLICATION, UNSPECIFIED WHETHER LONG TERM INSULIN USE (HCC): Primary | ICD-10-CM

## 2025-02-27 NOTE — TELEPHONE ENCOUNTER
Form completed, faxed to number provided, successful confirmation received, form sent to scanning

## 2025-02-27 NOTE — PROGRESS NOTES
This is the initial home health certification for [facility]  SOC: 1/11/2025  Dates of service are for 1/11/2025 to 3/11/2025     See scanned reports for plan of care     Order# [id number]

## 2025-02-27 NOTE — PROGRESS NOTES
This is the initial home health certification for [facility]  SOC: 1/11/2025  Dates of service are for 1/11/2025 to 3/11/2025    See scanned reports for plan of care     Order# [id number]

## 2025-03-05 ENCOUNTER — TELEPHONE (OUTPATIENT)
Age: 89
End: 2025-03-05

## 2025-03-11 ENCOUNTER — MED REC SCAN ONLY (OUTPATIENT)
Dept: INTERNAL MEDICINE CLINIC | Facility: CLINIC | Age: 89
End: 2025-03-11

## 2025-03-20 ENCOUNTER — TELEPHONE (OUTPATIENT)
Dept: INTERNAL MEDICINE CLINIC | Facility: CLINIC | Age: 89
End: 2025-03-20

## 2025-03-20 NOTE — TELEPHONE ENCOUNTER
Maida called from [facility] home health called following up on home health order that was sent 3/14, is needing  signature.

## 2025-03-20 NOTE — TELEPHONE ENCOUNTER
disregard previous message, order was faxed back on 3/14 and scanned into media, re faxed order to number provided, waiting for confirmation

## 2025-04-09 ENCOUNTER — TELEPHONE (OUTPATIENT)
Dept: INTERNAL MEDICINE CLINIC | Facility: CLINIC | Age: 89
End: 2025-04-09

## 2025-05-21 ENCOUNTER — TELEPHONE (OUTPATIENT)
Dept: INTERNAL MEDICINE CLINIC | Facility: CLINIC | Age: 89
End: 2025-05-21

## 2025-06-12 ENCOUNTER — TELEPHONE (OUTPATIENT)
Dept: INTERNAL MEDICINE CLINIC | Facility: CLINIC | Age: 89
End: 2025-06-12

## 2025-06-12 NOTE — TELEPHONE ENCOUNTER
Called and spoke to patient regarding scheduling his [facility] appointment.     Patient stated he is happy to schedule appt but prefers this be done with his son, Sudheer who drives him.     Called Sudheer and left message for him to call back.

## 2025-06-20 NOTE — TELEPHONE ENCOUNTER
Alicia FITZGERALD from [facility] stated the patient informed her he does not check his blood sugars.  The patient is on Metformin.    An order will be needed if it is ok not to check his blood sugar or an order for a glucometer is you would like him to check his blood sugars.  They can teach him how to use.      A written Order is preferred.  Please fax the order to   [phone]  It can be written on any piece of paper.   Phone Number Called: [phone] [employer]    Call outcome: Please see below.    Message: Called [employer] . Ended up having to start a peer to peer for the patients [employer]. After finding out what is needed upon the insurance doing some research they have everything they need but the PDMP which needs to be reviewed. Once reviewed the insurance company will need to be called and case number [id number] will be needed.when calling back the insurance company

## 2025-06-27 DIAGNOSIS — Z08 ENCOUNTER FOR FOLLOW-UP SURVEILLANCE OF PROSTATE CANCER: Primary | ICD-10-CM

## 2025-06-27 DIAGNOSIS — Z85.46 ENCOUNTER FOR FOLLOW-UP SURVEILLANCE OF PROSTATE CANCER: Primary | ICD-10-CM

## 2025-06-30 ENCOUNTER — APPOINTMENT (OUTPATIENT)
Age: 89
End: 2025-06-30
Attending: INTERNAL MEDICINE
Payer: MEDICARE

## 2025-07-14 ENCOUNTER — NURSE ONLY (OUTPATIENT)
Age: 89
End: 2025-07-14
Attending: INTERNAL MEDICINE
Payer: MEDICARE

## 2025-07-14 ENCOUNTER — OFFICE VISIT (OUTPATIENT)
Age: 89
End: 2025-07-14
Attending: INTERNAL MEDICINE
Payer: MEDICARE

## 2025-07-14 VITALS
RESPIRATION RATE: 16 BRPM | BODY MASS INDEX: 26 KG/M2 | TEMPERATURE: 97 F | DIASTOLIC BLOOD PRESSURE: 76 MMHG | WEIGHT: 174 LBS | SYSTOLIC BLOOD PRESSURE: 157 MMHG | HEART RATE: 77 BPM | OXYGEN SATURATION: 98 %

## 2025-07-14 DIAGNOSIS — Z85.46 ENCOUNTER FOR FOLLOW-UP SURVEILLANCE OF PROSTATE CANCER: ICD-10-CM

## 2025-07-14 DIAGNOSIS — Z08 ENCOUNTER FOR FOLLOW-UP SURVEILLANCE OF PROSTATE CANCER: Primary | ICD-10-CM

## 2025-07-14 DIAGNOSIS — Z08 ENCOUNTER FOR FOLLOW-UP SURVEILLANCE OF PROSTATE CANCER: ICD-10-CM

## 2025-07-14 DIAGNOSIS — D64.9 NORMOCYTIC ANEMIA: ICD-10-CM

## 2025-07-14 DIAGNOSIS — Z85.46 ENCOUNTER FOR FOLLOW-UP SURVEILLANCE OF PROSTATE CANCER: Primary | ICD-10-CM

## 2025-07-14 LAB
BASOPHILS # BLD AUTO: 0.02 X10(3) UL (ref 0–0.2)
BASOPHILS NFR BLD AUTO: 0.4 %
DEPRECATED HBV CORE AB SER IA-ACNC: 48 NG/ML (ref 50–336)
DEPRECATED RDW RBC AUTO: 43.8 FL (ref 35.1–46.3)
EOSINOPHIL # BLD AUTO: 0.08 X10(3) UL (ref 0–0.7)
EOSINOPHIL NFR BLD AUTO: 1.8 %
ERYTHROCYTE [DISTWIDTH] IN BLOOD BY AUTOMATED COUNT: 14.6 % (ref 11–15)
HCT VFR BLD AUTO: 36.1 % (ref 39–53)
HGB BLD-MCNC: 11.5 G/DL (ref 13–17.5)
IMM GRANULOCYTES # BLD AUTO: 0.02 X10(3) UL (ref 0–1)
IMM GRANULOCYTES NFR BLD: 0.4 %
IRON SATN MFR SERPL: 25 % (ref 20–50)
IRON SERPL-MCNC: 74 UG/DL (ref 65–175)
LYMPHOCYTES # BLD AUTO: 1.37 X10(3) UL (ref 1–4)
LYMPHOCYTES NFR BLD AUTO: 30.7 %
MCH RBC QN AUTO: 26.4 PG (ref 26–34)
MCHC RBC AUTO-ENTMCNC: 31.9 G/DL (ref 31–37)
MCV RBC AUTO: 83 FL (ref 80–100)
MONOCYTES # BLD AUTO: 0.61 X10(3) UL (ref 0.1–1)
MONOCYTES NFR BLD AUTO: 13.7 %
NEUTROPHILS # BLD AUTO: 2.36 X10 (3) UL (ref 1.5–7.7)
NEUTROPHILS # BLD AUTO: 2.36 X10(3) UL (ref 1.5–7.7)
NEUTROPHILS NFR BLD AUTO: 53 %
PLATELET # BLD AUTO: 240 10(3)UL (ref 150–450)
PSA SERPL-MCNC: <0.04 NG/ML (ref ?–4)
RBC # BLD AUTO: 4.35 X10(6)UL (ref 3.8–5.8)
TOTAL IRON BINDING CAPACITY: 291 UG/DL (ref 250–425)
TRANSFERRIN SERPL-MCNC: 216 MG/DL (ref 215–365)
WBC # BLD AUTO: 4.5 X10(3) UL (ref 4–11)

## 2025-07-14 NOTE — PROGRESS NOTES
[facility] Progress Note    Patient Name: Leonard Salcido   YOB: 1935   Medical Record Number: [id number]   CSN: [id number]   Consulting Physician: Viktor Bloom MD  Referring Physician(s): No ref. provider found  Date of Visit: 7/14/2025     Chief Complaint:  High Risk Disease prostate cancer; grade group 5; adenocarcinoma, [place] 4+5=9,[id number], stage 1    History of Present Illness:     Leonard Salcido is a 89 year old male that was seen today in the Cancer Center for evaluation of the above condition. Patient has PMH relevant for chronic low back pain in the lumbar spine region, diabetes on metformin who presents following prostate cancer diagnosis. Patient was diagnosed on 12/13/2021 with [place] score 4+5=9; grade group 5, very high risk disease. His PSA of 15.7 ng/mL on 11/1/2021. Patient has been seen and evaluated by urology and radiation oncology and planned for EBRT with ADT therapy. Patient was given his first dose of ADT via Eligard (Leuprolide) on 1/7/2022 at 45 mg and planned for 6 month dosing.  Patient reports that following the injection he developed likely a small hematoma beneath the skin which was stable for about 5 to 6 days after receiving his ADT therapy. Patient mentions that following CT simulation he was laying in an unusual position and then felt his injection site in the right lower abdominal region was more irritated. \"Alyssia\" then went home and placed ice and a cream on the site and it caused skin blistering and further irritation.  Today, the skin has improved with no worsening skin lesions. His skin reaction was not associated with any itching, dyspnea, or anaphylactic reaction features.  Patient has completed bone imaging study on 12/27 showing no evidence of distant disease involvement as well as CT abdomen pelvis imaging on 12/31 showing no evidence of local regional extension of prostate cancer with regards to pelvic lymphadenopathy. Alyssia lives independently  with an excellent performance status and denies any systemic signs of illness as he reports he has been working on weight loss with regards to his diabetes. He denies any new back pain or bone pain or any other concerns on ROS.    His surveillance CT chest imaging showing no evidence of metastatic disease. He has completed EBRT on 3/24/2022.    Interval History:  Patient presents for surveillance of his high risk prostate cancer s/p planned ADT treatment course. His PSA has been undetectable. The patient's dementia features have worsened. His weight has increased; previously mentioned that his appetite tends to fluctuate. Patient denies any blood loss,denies any systemic signs of illness or new concerns on ROS.       Allergies:   Allergies   Allergen Reactions    Eligard [Leuprolide] RASH     Rash with large sized blisters       Current Medications:   Blood Glucose Monitoring Suppl w/Device Does not apply Kit Test once daily 1 kit 0    Glucose Blood (BLOOD GLUCOSE TEST STRIPS 333) In Vitro Strip 1 strip by In Vitro route daily. 100 strip 5    Lancets Misc. Does not apply Kit Test once daily 1 kit 0    Mirabegron ER (MYRBETRIQ) 50 MG Oral Tablet 24 Hr Take 1 tablet (50 mg total) by mouth daily. 30 tablet 3    metFORMIN  MG Oral Tablet 24 Hr Take 1 tablet (500 mg total) by mouth daily. 90 tablet 0    pantoprazole 40 MG Oral Tab EC Take 1 tablet (40 mg total) by mouth before breakfast. 90 tablet 3    donepezil 5 MG Oral Tab Take 1 tablet (5 mg total) by mouth nightly. 90 tablet 3    Lovastatin 20 MG Oral Tab Take 2 tablets (40 mg total) by mouth nightly. 90 tablet 3    amLODIPine 5 MG Oral Tab Take 1 tablet (5 mg total) by mouth daily. 90 tablet 3    lisinopril-hydroCHLOROthiazide 20-12.5 MG Oral Tab Take 2 tablets by mouth daily. 180 tablet 3    tamsulosin 0.4 MG Oral Cap Take 1 capsule (0.4 mg total) by mouth daily. 90 capsule 1    Ferrous Sulfate 325 (65 Fe) MG Oral Tab Take 1 tablet (325 mg total) by mouth  daily with breakfast. 90 tablet 1      leuprolide (ELIGARD) depot injection 45 mg  45 mg Subcutaneous Q6 Months Alicia Bañuelos MD   45 mg at 01/07/22 1638       Review of Systems:    Constitutional: Negative for anorexia, chills, fatigue, fevers, night sweats and weight loss.  Eyes: Negative for visual disturbance, irritation and redness.  Respiratory: Negative for cough, hemoptysis, chest pain, or dyspnea on exertion.  Gastrointestinal: Negative for dysphagia, odynophagia, reflux symptoms, nausea, vomiting, change in bowel habits, diarrhea, constipation and abdominal pain.  Integument/breast: Negative for new skin lesions.  Hematologic/lymphatic: Negative for easy bruising, bleeding, and lymphadenopathy.  Musculoskeletal: +chronic low back pain; not endorsed today  Neurological: Negative for headaches, dizziness, speech problems, gait problems and weakness.    A comprehensive 14 point review of systems was completed.  Pertinent positives and negatives noted in the the HPI.     Vital Signs:  /76 (BP Location: Left arm, Patient Position: Sitting, Cuff Size: large)   Pulse 77   Temp 97.2 °F (36.2 °C) (Tympanic)   Resp 16   Wt 78.9 kg (174 lb)   SpO2 98%   BMI 26.46 kg/m²     Physical Examination:    General: Patient is alert and oriented x 3, not in acute distress.  Psych: Friendly, cooperative with appropriate questions and responses  HEENT: EOMs intact. Oropharynx is clear.   Neck: No palpable lymphadenopathy. Neck is supple.  Lymphatics: There is no palpable lymphadenopathy throughout in the cervical, supraclavicular, axillary, or inguinal regions.  Chest: Clear to auscultation. No wheezes or rales.  Heart: Regular rate and rhythm. S1S2 normal.  Abdomen: Soft, non tender with good bowel sounds.  No hepatosplenomegaly.  No palpable mass.  Extremities: No edema or calf tenderness.  Neurological: Grossly intact.     Performance Status:    ECOG 0: Fully active, able to carry on all pre-disease  performance without restriction      Labs:    Lab Results   Component Value Date/Time    WBC 4.6 12/21/2024 05:30 PM    RBC 3.84 12/21/2024 05:30 PM    HGB 10.7 (L) 12/21/2024 05:30 PM    HCT 32.5 (L) 12/21/2024 05:30 PM    MCV 84.6 12/21/2024 05:30 PM    MCH 27.9 12/21/2024 05:30 PM    MCHC 32.9 12/21/2024 05:30 PM    RDW 14.3 12/21/2024 05:30 PM    NEPRELIM 2.56 12/21/2024 05:30 PM    .0 12/21/2024 05:30 PM       Lab Results   Component Value Date/Time     (H) 12/22/2024 07:33 AM    BUN 24 (H) 12/22/2024 07:33 AM    CREATSERUM 1.00 12/22/2024 07:33 AM    GFRNAA 62 06/24/2022 06:53 AM    CA 9.8 12/22/2024 07:33 AM    ALB 4.2 12/21/2024 05:30 PM     12/22/2024 07:33 AM    K 4.3 12/22/2024 07:33 AM     12/22/2024 07:33 AM    CO2 29.0 12/22/2024 07:33 AM    ALKPHO 77 12/21/2024 05:30 PM    AST 22 12/21/2024 05:30 PM    ALT 14 12/21/2024 05:30 PM       Recent Results (from the past [id number] hours)   PSA - DIAGNOSTIC [E]    Collection Time: 12/30/24  3:00 PM   Result Value Ref Range    Total PSA  <0.04 <=4.00 ng/mL     *Note: Due to a large number of results and/or encounters for the requested time period, some results have not been displayed. A complete set of results can be found in Results Review.       Imaging:    N/A        Impression:    No diagnosis found.        89 year old M with PMH elevant for chronic low back pain in the lumbar spine region, diabetes on metformin who presents following prostate cancer diagnosis.    Plan:    1.) Prostate Cancer--High Risk disease    --[place] score 4+5=9; grade group 5, high risk disease. His PSA of 15.7 ng/mL on 11/1/2021  --I have discussed with the pt and his son, Maxwell, his diagnosis, his stage of disease, his prostate cancer risk categorization and why the standard of care would be EBRT+(ADT for 2 yrs) is definitive therapy for his prostate cancer  --discussed pt's CT chest imaging with him and his son, Sudheer, showing NO signs of distant metastatic  disease  --his PSA was already lowered s/p 1st dose of ADT in 1/2022; then down to 3.07, his testosterone levels are suppressed s/p first dose of ADT injection  --pt had a skin reaction in relation to Eliguard. He was given the 45 mg every 6 month dosing  --next dosing of ADT would be 7/7/2022.   --We will plan Trelstar 11.25 mg every 3 months would be a nice alternative for him as he needs treatment with ADT for 2 yrs    --Called the patient and explained miscalculation of his risk profile as he only has high risk disease and does not need to take the extra step of taking abiraterone+prednisone. Patient understood these instructions, has not suffered any side effects from oral ADT and thanked me for the call. Explained the situation to his two sons who were appreciative of the call and information.     --pt received Trelstar ADT in 10/2022; tolerated this form of ADT well without any skin reaction, lesions or injections    --continued Trelstar ADT 11.25 mg every 3 months to treat his prostate cancer until 7/2023    --we reviewed that we will plan only 18 months of ADT (completion of therapy 7/18/2023) as he has some worsening memory issues which may be from advancing age, but can be worsened by ADT    --his PSA was undetectable at <0.01 ng/mL in July, 2023    --PSA has been undetectable at <0.04; memory impairment is still present despite being off ADT, seems to have worsening dementia    --needs his repeat PSA now; rec to go to lab for testing. low suspicion for progression of disease in light of his prostate cancer history    --RTC in 6 mo for follow up PSA diagnostic check    --Discussed the side effect profile of this form of Androgen Deprivation Therapy.   --He is tolerating this form of ADT better than Lupron in terms of not causing any skin rash/blisters      2.) Chronic Low Back Pain    --Noted condition present for many years with lumbar sacral spinal fusion, unrelated to prostate cancer involvement in the  bone    3.) Normocytic Anemia    --present since 2011, suspect anemia of chronic disease in light of HTN and likely DM  --denies blood loss from any orifice  --repeat CBC+iron studies today show and improved hgb of 11.5, ferritin level is only mildly low; likely due to not eating that much at times; f/u with your PCP     4.) HTN    --rec pt that he needs to see Dr. Samano for f/u    MDM: Moderate Risk    Viktor Bloom MD  [facility]  [facility] [address], [place] [address]

## (undated) DEVICE — Device: Brand: CUSTOM PROCEDURE KIT

## (undated) DEVICE — TRAP 4 CPTR CHMBR N EZ INLN

## (undated) DEVICE — SNARE ENDOSCOPIC 10MM ROUND

## (undated) DEVICE — Device: Brand: DUAL NARE NASAL CANNULAE FEMALE LUER CON 7FT O2 TUBE

## (undated) DEVICE — CLIP LGT 11MM OPEN 2.8MM 235CM

## (undated) DEVICE — MEDI-VAC NON-CONDUCTIVE SUCTION TUBING 6MM X 1.8M (6FT.) L: Brand: CARDINAL HEALTH

## (undated) NOTE — LETTER
[address], [place]  Authorization for Invasive Procedures  1.  I hereby authorize Dr. Reyes Prince , my physician and whomever may be designated as the doctor's assistant, to perform the following operation and/or procedure:  [place] all, of the potential risks that can occur: fever and allergic reactions, hemolytic reactions, transmission of disease such as hepatitis, AIDS, cytomegalovirus (CMV), and flluid overload.  In the event that I wish to have autologous transfusions of my own b desirable in the judgment of my physician.      Signature of Patient:  ________________________________________________ Date: _________Time: _________    Responsible person in case of minor or unconscious: _____________________________Relationship: ________

## (undated) NOTE — ED AVS SNAPSHOT
Miri Varghese   MRN: [id number]    Department:  [facility] Emergency Department   Date of Visit:  8/4/2017           Disclosure     Insurance plans vary and the physician(s) referred by the ER may not be covered by your plan.  Please contact CARE PHYSICIAN AT ONCE OR RETURN IMMEDIATELY TO THE EMERGENCY DEPARTMENT. If you have been prescribed any medication(s), please fill your prescription right away and begin taking the medication(s) as directed.   If you believe that any of the medications

## (undated) NOTE — Clinical Note
5/12/2017          To Whom It May Concern:     Vivien Garcia is currently under my medical care and may not return to work at this time. Mr Grady Bella has chronic lower back disease and has had surgery on 2 different occasions.  He is not fit to work a

## (undated) NOTE — LETTER
[facility]  Administration of Anesthesia  1. I, Dwain Mtz, or _________________________________ acting on his behalf, (Patient) (Dependent/Representative) request to receive anesthesia for my pending procedure/operation/treatment. bleeding, seizure, cardiac arrest and death. 7. AWARENESS: I understand that it is possible (but unlikely) to have explicit memory of events from the operating room while under general anesthesia.   8. ELECTROCONVULSIVE THERAPY PATIENTS: This consent serve below affirms that prior to the time of the procedure, I have explained to the patient and/or his/her guardian, the risks and benefits of undergoing anesthesia, as well as any reasonable alternatives.     ___________________________________________________

## (undated) NOTE — MR AVS SNAPSHOT
[address] [facility] [address]  [phone]               Thank you for choosing us for your health care visit with Roma Henry MD.  We are glad to serve you and happy to provide you with this summary of your Today's Vital Signs     BP Pulse Temp Height Weight BMI    132/82 mmHg 100 98.8 °F (37.1 °C) (Tympanic) 5' 8\" (1.727 m) 182 lb 3.2 oz (82.645 kg) 27.71 kg/m2         Current Medications          This list is accurate as of: 5/12/17  2:15 PM.  Always use y Avoid over sized portions. Don’t eat while when you’re bored.      EAT THESE FOODS MORE OFTEN: EAT THESE FOODS LESS OFTEN:   Make half your plate fruits and vegetables Highly refined, white starches including white bread, rice and pasta   Eat plenty of pr

## (undated) NOTE — ED AVS SNAPSHOT
Pao Deleon   MRN: [id number]    Department:  [facility] Emergency Department   Date of Visit:  9/9/2019           Disclosure     Insurance plans vary and the physician(s) referred by the ER may not be covered by your plan.  Please contact within the next three months to obtain basic health screening including reassessment of your blood pressure.     IF THERE IS ANY CHANGE OR WORSENING OF YOUR CONDITION, CALL YOUR PRIMARY CARE PHYSICIAN AT ONCE OR RETURN IMMEDIATELY TO THE EMERGENCY DEPARTMEN

## (undated) NOTE — LETTER
[place] [address]    Consent for Diagnostic Services    Your physician has ordered one of the following tests to determine the function of your heart: Regadenoson Sestamibi. The information obtained will aid your physician in detecting the possible presence of heart disease, to determine why you may have such symptoms such as chest pain or shortness of breath and to determine an appropriate plan of medical management.    The risks associated with any exercise test or pharmacological stress test are similar to the risks associated with exercise, including an abnormal blood pressure, dizziness, fainting, abnormal heart rate and in very rear instances heart attach, stroke, or death. During the test you must report any unusual feeling or symptoms you experience during the test. Every effort will be made to minimize such risks by careful observation during the test. Emergency equipment and trained personnel are available to deal with unusual situations, which may arise and these personnel have permission to treat you.     During the treadmill or nuclear stress test, the exercise intensity begins at a low level and advances in stages depending on your fitness level. We may stop the test at any time because of signs of fatigue or changes in your heart rate, electrocardiogram, or blood pressure, or other symptoms you may experience.     If your doctor has ordered a pharmacological stress test, you may experience a headache, shortness of breath, flushing, warmth, rapid heart rate, or a bitter taste in your mouth. Sometimes you may not experience any symptoms. Your prompt reporting of any symptoms is very important.     During a Regadenoson stress test, you are given an injection of Regadenoson. Immediately after the Regadenoson is given, you will be injected with a radioactive isotope. During a Dobutamine stress test, Dobutamine infuses over approximately fifteen minutes. A radioactive isotope is  injected during the infusion. After either pharmacological stress test, you will have either a nuclear scan or an echocardiogram.    The information that is obtained during your testing will be treated as privileged and confidential. The information obtained will be given to your doctor and may be used for insurance purposes or to track and trend data as part of  with your privacy retained.    I have read and understand the above information. I voluntarily agree and consent to the above ordered test. Furthermore, no guarantees or assurances have been given by anyone as to the results that may be obtained from this procedure. Any questions that may have occurred to me have been answered to my satisfaction.     Signature of Patient:   ____________________________________________________________    Signature of Witness: _______________________________Date: ___________Time: ________

## (undated) NOTE — ED AVS SNAPSHOT
Zaina Pagan   MRN: [id number]    Department:  [facility] Emergency Department   Date of Visit:  7/8/2018           Disclosure     Insurance plans vary and the physician(s) referred by the ER may not be covered by your plan.  Please contact within the next three months to obtain basic health screening including reassessment of your blood pressure.     IF THERE IS ANY CHANGE OR WORSENING OF YOUR CONDITION, CALL YOUR PRIMARY CARE PHYSICIAN AT ONCE OR RETURN IMMEDIATELY TO THE EMERGENCY DEPARTMEN